# Patient Record
Sex: FEMALE | Race: WHITE | NOT HISPANIC OR LATINO | Employment: OTHER | URBAN - METROPOLITAN AREA
[De-identification: names, ages, dates, MRNs, and addresses within clinical notes are randomized per-mention and may not be internally consistent; named-entity substitution may affect disease eponyms.]

---

## 2019-01-01 ENCOUNTER — APPOINTMENT (OUTPATIENT)
Dept: CT IMAGING | Facility: HOSPITAL | Age: 84
End: 2019-01-01

## 2019-01-01 ENCOUNTER — LAB REQUISITION (OUTPATIENT)
Dept: LAB | Facility: HOSPITAL | Age: 84
End: 2019-01-01

## 2019-01-01 ENCOUNTER — APPOINTMENT (OUTPATIENT)
Dept: GENERAL RADIOLOGY | Facility: HOSPITAL | Age: 84
End: 2019-01-01

## 2019-01-01 ENCOUNTER — INPATIENT HOSPITAL (OUTPATIENT)
Dept: URBAN - METROPOLITAN AREA HOSPITAL 84 | Facility: HOSPITAL | Age: 84
End: 2019-01-01

## 2019-01-01 ENCOUNTER — APPOINTMENT (OUTPATIENT)
Dept: NUCLEAR MEDICINE | Facility: HOSPITAL | Age: 84
End: 2019-01-01

## 2019-01-01 ENCOUNTER — APPOINTMENT (OUTPATIENT)
Dept: ULTRASOUND IMAGING | Facility: HOSPITAL | Age: 84
End: 2019-01-01

## 2019-01-01 ENCOUNTER — OFFICE VISIT (OUTPATIENT)
Dept: CARDIOLOGY | Facility: CLINIC | Age: 84
End: 2019-01-01

## 2019-01-01 ENCOUNTER — HOSPITAL ENCOUNTER (INPATIENT)
Facility: HOSPITAL | Age: 84
LOS: 1 days | Discharge: NURSING, REHAB OR LONG-TERM CARE | End: 2019-12-13
Attending: EMERGENCY MEDICINE | Admitting: INTERNAL MEDICINE

## 2019-01-01 ENCOUNTER — HOSPITAL ENCOUNTER (INPATIENT)
Facility: HOSPITAL | Age: 84
LOS: 4 days | Discharge: NURSING, REHAB OR LONG-TERM CARE | End: 2019-10-28
Attending: EMERGENCY MEDICINE | Admitting: HOSPITALIST

## 2019-01-01 ENCOUNTER — HOSPITAL ENCOUNTER (INPATIENT)
Facility: HOSPITAL | Age: 84
LOS: 4 days | Discharge: NURSING, REHAB OR LONG-TERM CARE | End: 2019-10-21
Attending: EMERGENCY MEDICINE | Admitting: INTERNAL MEDICINE

## 2019-01-01 ENCOUNTER — APPOINTMENT (OUTPATIENT)
Dept: INTERVENTIONAL RADIOLOGY/VASCULAR | Facility: HOSPITAL | Age: 84
End: 2019-01-01

## 2019-01-01 VITALS
OXYGEN SATURATION: 100 % | RESPIRATION RATE: 13 BRPM | WEIGHT: 192.9 LBS | HEIGHT: 64 IN | DIASTOLIC BLOOD PRESSURE: 44 MMHG | TEMPERATURE: 97.9 F | HEART RATE: 85 BPM | SYSTOLIC BLOOD PRESSURE: 115 MMHG | BODY MASS INDEX: 32.93 KG/M2

## 2019-01-01 VITALS
SYSTOLIC BLOOD PRESSURE: 152 MMHG | BODY MASS INDEX: 38.09 KG/M2 | HEART RATE: 70 BPM | WEIGHT: 194 LBS | OXYGEN SATURATION: 85 % | DIASTOLIC BLOOD PRESSURE: 63 MMHG | HEIGHT: 60 IN

## 2019-01-01 VITALS
OXYGEN SATURATION: 99 % | TEMPERATURE: 97.9 F | HEIGHT: 60 IN | HEART RATE: 71 BPM | BODY MASS INDEX: 38.31 KG/M2 | WEIGHT: 195.11 LBS | DIASTOLIC BLOOD PRESSURE: 47 MMHG | RESPIRATION RATE: 20 BRPM | SYSTOLIC BLOOD PRESSURE: 150 MMHG

## 2019-01-01 VITALS
SYSTOLIC BLOOD PRESSURE: 125 MMHG | DIASTOLIC BLOOD PRESSURE: 64 MMHG | WEIGHT: 216.49 LBS | TEMPERATURE: 97.9 F | OXYGEN SATURATION: 97 % | HEIGHT: 60 IN | HEART RATE: 57 BPM | RESPIRATION RATE: 18 BRPM | BODY MASS INDEX: 42.5 KG/M2

## 2019-01-01 DIAGNOSIS — A41.9 SEVERE SEPSIS (HCC): ICD-10-CM

## 2019-01-01 DIAGNOSIS — I50.9 ACUTE CONGESTIVE HEART FAILURE, UNSPECIFIED HEART FAILURE TYPE (HCC): Primary | ICD-10-CM

## 2019-01-01 DIAGNOSIS — N20.1 RIGHT URETERAL STONE: ICD-10-CM

## 2019-01-01 DIAGNOSIS — E11.9 CONTROLLED TYPE 2 DIABETES MELLITUS WITHOUT COMPLICATION, WITHOUT LONG-TERM CURRENT USE OF INSULIN (HCC): ICD-10-CM

## 2019-01-01 DIAGNOSIS — I25.118 CORONARY ARTERY DISEASE OF NATIVE ARTERY OF NATIVE HEART WITH STABLE ANGINA PECTORIS (HCC): Primary | ICD-10-CM

## 2019-01-01 DIAGNOSIS — R77.8 ELEVATED TROPONIN: ICD-10-CM

## 2019-01-01 DIAGNOSIS — J96.01 ACUTE RESPIRATORY FAILURE WITH HYPOXIA AND HYPERCAPNIA (HCC): ICD-10-CM

## 2019-01-01 DIAGNOSIS — N18.4 CKD STAGE 4 SECONDARY TO HYPERTENSION (HCC): ICD-10-CM

## 2019-01-01 DIAGNOSIS — D63.1 ANEMIA IN CHRONIC KIDNEY DISEASE: ICD-10-CM

## 2019-01-01 DIAGNOSIS — R53.1 WEAKNESS: Primary | ICD-10-CM

## 2019-01-01 DIAGNOSIS — R65.20 SEVERE SEPSIS (HCC): ICD-10-CM

## 2019-01-01 DIAGNOSIS — I10 ESSENTIAL HYPERTENSION: ICD-10-CM

## 2019-01-01 DIAGNOSIS — Z00.00 ROUTINE GENERAL MEDICAL EXAMINATION AT A HEALTH CARE FACILITY: ICD-10-CM

## 2019-01-01 DIAGNOSIS — I50.9 ACUTE CONGESTIVE HEART FAILURE, UNSPECIFIED HEART FAILURE TYPE (HCC): ICD-10-CM

## 2019-01-01 DIAGNOSIS — J96.02 ACUTE RESPIRATORY FAILURE WITH HYPOXIA AND HYPERCAPNIA (HCC): ICD-10-CM

## 2019-01-01 DIAGNOSIS — R10.84 GENERALIZED ABDOMINAL PAIN: ICD-10-CM

## 2019-01-01 DIAGNOSIS — I12.9 CKD STAGE 4 SECONDARY TO HYPERTENSION (HCC): ICD-10-CM

## 2019-01-01 DIAGNOSIS — R41.82 ALTERED MENTAL STATUS, UNSPECIFIED ALTERED MENTAL STATUS TYPE: Primary | ICD-10-CM

## 2019-01-01 DIAGNOSIS — N18.9 CHRONIC KIDNEY DISEASE, UNSPECIFIED: ICD-10-CM

## 2019-01-01 DIAGNOSIS — E78.00 PURE HYPERCHOLESTEROLEMIA: ICD-10-CM

## 2019-01-01 DIAGNOSIS — K59.04 CHRONIC IDIOPATHIC CONSTIPATION: ICD-10-CM

## 2019-01-01 LAB
ABO + RH BLD: NORMAL
ABO GROUP BLD: NORMAL
ABO GROUP BLD: NORMAL
ACANTHOCYTES BLD QL SMEAR: ABNORMAL
ALBUMIN SERPL-MCNC: 3.1 G/DL (ref 3.5–5.2)
ALBUMIN SERPL-MCNC: 3.1 G/DL (ref 3.5–5.2)
ALBUMIN SERPL-MCNC: 3.3 G/DL (ref 3.5–5.2)
ALBUMIN SERPL-MCNC: 3.4 G/DL (ref 3.5–5.2)
ALBUMIN SERPL-MCNC: 3.5 G/DL (ref 3.5–5.2)
ALBUMIN SERPL-MCNC: 3.8 G/DL (ref 3.5–5.2)
ALBUMIN/GLOB SERPL: 0.9 G/DL
ALBUMIN/GLOB SERPL: 1 G/DL
ALBUMIN/GLOB SERPL: 1 G/DL
ALBUMIN/GLOB SERPL: 1.1 G/DL
ALBUMIN/GLOB SERPL: 1.2 G/DL
ALP SERPL-CCNC: 116 U/L (ref 39–117)
ALP SERPL-CCNC: 56 U/L (ref 39–117)
ALP SERPL-CCNC: 56 U/L (ref 39–117)
ALP SERPL-CCNC: 69 U/L (ref 39–117)
ALP SERPL-CCNC: 74 U/L (ref 39–117)
ALP SERPL-CCNC: 75 U/L (ref 39–117)
ALP SERPL-CCNC: 79 U/L (ref 39–117)
ALP SERPL-CCNC: 79 U/L (ref 39–117)
ALP SERPL-CCNC: 84 U/L (ref 39–117)
ALT SERPL W P-5'-P-CCNC: 10 U/L (ref 1–33)
ALT SERPL W P-5'-P-CCNC: 10 U/L (ref 1–33)
ALT SERPL W P-5'-P-CCNC: 25 U/L (ref 1–33)
ALT SERPL W P-5'-P-CCNC: 8 U/L (ref 1–33)
ALT SERPL W P-5'-P-CCNC: 9 U/L (ref 1–33)
ALT SERPL W P-5'-P-CCNC: 9 U/L (ref 1–33)
ANION GAP SERPL CALCULATED.3IONS-SCNC: 10 MMOL/L (ref 5–15)
ANION GAP SERPL CALCULATED.3IONS-SCNC: 12 MMOL/L (ref 5–15)
ANION GAP SERPL CALCULATED.3IONS-SCNC: 14 MMOL/L (ref 5–15)
ANION GAP SERPL CALCULATED.3IONS-SCNC: 14.2 MMOL/L (ref 5–15)
ANION GAP SERPL CALCULATED.3IONS-SCNC: 15 MMOL/L (ref 5–15)
ANION GAP SERPL CALCULATED.3IONS-SCNC: 15.9 MMOL/L (ref 5–15)
ANION GAP SERPL CALCULATED.3IONS-SCNC: 9 MMOL/L (ref 5–15)
APTT PPP: 24.2 SECONDS (ref 24–31)
APTT PPP: 25.3 SECONDS (ref 24–31)
ARTERIAL PATENCY WRIST A: POSITIVE
AST SERPL-CCNC: 15 U/L (ref 1–32)
AST SERPL-CCNC: 15 U/L (ref 1–32)
AST SERPL-CCNC: 16 U/L (ref 1–32)
AST SERPL-CCNC: 16 U/L (ref 1–32)
AST SERPL-CCNC: 17 U/L (ref 1–32)
AST SERPL-CCNC: 18 U/L (ref 1–32)
AST SERPL-CCNC: 19 U/L (ref 1–32)
AST SERPL-CCNC: 23 U/L (ref 1–32)
AST SERPL-CCNC: 42 U/L (ref 1–32)
ATMOSPHERIC PRESS: ABNORMAL MM[HG]
B PERT DNA SPEC QL NAA+PROBE: NOT DETECTED
B PERT DNA SPEC QL NAA+PROBE: NOT DETECTED
BACTERIA BLD CULT: ABNORMAL
BACTERIA SPEC AEROBE CULT: ABNORMAL
BACTERIA SPEC AEROBE CULT: NORMAL
BACTERIA UR QL AUTO: ABNORMAL /HPF
BASE EXCESS BLDA CALC-SCNC: 0.9 MMOL/L (ref 0–3)
BASE EXCESS BLDA CALC-SCNC: 10.4 MMOL/L (ref 0–3)
BASE EXCESS BLDA CALC-SCNC: 12.5 MMOL/L (ref 0–3)
BASE EXCESS BLDA CALC-SCNC: 9.7 MMOL/L (ref 0–3)
BASOPHILS # BLD AUTO: 0 10*3/MM3 (ref 0–0.2)
BASOPHILS # BLD AUTO: 0.1 10*3/MM3 (ref 0–0.2)
BASOPHILS NFR BLD AUTO: 0.3 % (ref 0–1.5)
BASOPHILS NFR BLD AUTO: 0.5 % (ref 0–1.5)
BASOPHILS NFR BLD AUTO: 0.6 % (ref 0–1.5)
BASOPHILS NFR BLD AUTO: 0.7 % (ref 0–1.5)
BASOPHILS NFR BLD AUTO: 0.7 % (ref 0–1.5)
BASOPHILS NFR BLD AUTO: 1 % (ref 0–1.5)
BASOPHILS NFR BLD AUTO: 1.1 % (ref 0–1.5)
BASOPHILS NFR BLD AUTO: 1.1 % (ref 0–1.5)
BDY SITE: ABNORMAL
BH BB BLOOD EXPIRATION DATE: NORMAL
BH BB BLOOD TYPE BARCODE: 600
BH BB DISPENSE STATUS: NORMAL
BH BB PRODUCT CODE: NORMAL
BH BB UNIT NUMBER: NORMAL
BILIRUB SERPL-MCNC: 0.2 MG/DL (ref 0.2–1.2)
BILIRUB SERPL-MCNC: 0.3 MG/DL (ref 0.2–1.2)
BILIRUB SERPL-MCNC: 0.4 MG/DL (ref 0.2–1.2)
BILIRUB SERPL-MCNC: 0.5 MG/DL (ref 0.2–1.2)
BILIRUB UR QL STRIP: NEGATIVE
BLD GP AB SCN SERPL QL: NEGATIVE
BLD GP AB SCN SERPL QL: NEGATIVE
BUN BLD-MCNC: 50 MG/DL (ref 8–23)
BUN BLD-MCNC: 53 MG/DL (ref 8–23)
BUN BLD-MCNC: 55 MG/DL (ref 8–23)
BUN BLD-MCNC: 61 MG/DL (ref 8–23)
BUN BLD-MCNC: 62 MG/DL (ref 8–23)
BUN BLD-MCNC: 62 MG/DL (ref 8–23)
BUN BLD-MCNC: 63 MG/DL (ref 8–23)
BUN BLD-MCNC: 66 MG/DL (ref 8–23)
BUN BLD-MCNC: 66 MG/DL (ref 8–23)
BUN BLD-MCNC: 67 MG/DL (ref 8–23)
BUN BLD-MCNC: 74 MG/DL (ref 8–23)
BUN BLD-MCNC: 76 MG/DL (ref 8–23)
BUN BLD-MCNC: 83 MG/DL (ref 8–23)
BUN BLD-MCNC: 84 MG/DL (ref 8–23)
BUN BLD-MCNC: 84 MG/DL (ref 8–23)
BUN/CREAT SERPL: 16.5 (ref 7–25)
BUN/CREAT SERPL: 19 (ref 7–25)
BUN/CREAT SERPL: 19.2 (ref 7–25)
BUN/CREAT SERPL: 22.4 (ref 7–25)
BUN/CREAT SERPL: 22.5 (ref 7–25)
BUN/CREAT SERPL: 23.3 (ref 7–25)
BUN/CREAT SERPL: 25 (ref 7–25)
BUN/CREAT SERPL: 25.3 (ref 7–25)
BUN/CREAT SERPL: 25.9 (ref 7–25)
BUN/CREAT SERPL: 26.2 (ref 7–25)
BUN/CREAT SERPL: 27.6 (ref 7–25)
BUN/CREAT SERPL: 28.4 (ref 7–25)
BUN/CREAT SERPL: 28.5 (ref 7–25)
BUN/CREAT SERPL: 30.3 (ref 7–25)
BUN/CREAT SERPL: 31.7 (ref 7–25)
C PNEUM DNA NPH QL NAA+NON-PROBE: NOT DETECTED
C PNEUM DNA NPH QL NAA+NON-PROBE: NOT DETECTED
CA-I BLDA-SCNC: 0.66 MMOL/L (ref 1.15–1.33)
CA-I BLDA-SCNC: 1.15 MMOL/L (ref 1.15–1.33)
CA-I SERPL ISE-MCNC: 0.95 MMOL/L (ref 1.2–1.3)
CA-I SERPL ISE-MCNC: 1.14 MMOL/L (ref 1.2–1.3)
CA-I SERPL ISE-MCNC: 1.15 MMOL/L (ref 1.2–1.3)
CA-I SERPL ISE-MCNC: 1.15 MMOL/L (ref 1.2–1.3)
CA-I SERPL ISE-MCNC: 1.2 MMOL/L (ref 1.2–1.3)
CALCIUM SPEC-SCNC: 8.1 MG/DL (ref 8.6–10.5)
CALCIUM SPEC-SCNC: 8.4 MG/DL (ref 8.6–10.5)
CALCIUM SPEC-SCNC: 8.4 MG/DL (ref 8.6–10.5)
CALCIUM SPEC-SCNC: 8.5 MG/DL (ref 8.6–10.5)
CALCIUM SPEC-SCNC: 8.5 MG/DL (ref 8.6–10.5)
CALCIUM SPEC-SCNC: 8.6 MG/DL (ref 8.6–10.5)
CALCIUM SPEC-SCNC: 8.6 MG/DL (ref 8.6–10.5)
CALCIUM SPEC-SCNC: 8.7 MG/DL (ref 8.6–10.5)
CALCIUM SPEC-SCNC: 8.8 MG/DL (ref 8.6–10.5)
CALCIUM SPEC-SCNC: 9.1 MG/DL (ref 8.6–10.5)
CALCIUM SPEC-SCNC: 9.1 MG/DL (ref 8.6–10.5)
CHLORIDE SERPL-SCNC: 100 MMOL/L (ref 98–107)
CHLORIDE SERPL-SCNC: 101 MMOL/L (ref 98–107)
CHLORIDE SERPL-SCNC: 103 MMOL/L (ref 98–107)
CHLORIDE SERPL-SCNC: 103 MMOL/L (ref 98–107)
CHLORIDE SERPL-SCNC: 104 MMOL/L (ref 98–107)
CHLORIDE SERPL-SCNC: 95 MMOL/L (ref 98–107)
CHLORIDE SERPL-SCNC: 95 MMOL/L (ref 98–107)
CHLORIDE SERPL-SCNC: 96 MMOL/L (ref 98–107)
CHLORIDE SERPL-SCNC: 97 MMOL/L (ref 98–107)
CHLORIDE SERPL-SCNC: 98 MMOL/L (ref 98–107)
CHLORIDE SERPL-SCNC: 98 MMOL/L (ref 98–107)
CHOLEST SERPL-MCNC: 119 MG/DL (ref 0–200)
CK SERPL-CCNC: 41 U/L (ref 20–180)
CK SERPL-CCNC: 81 U/L (ref 20–180)
CLARITY UR: ABNORMAL
CLARITY UR: ABNORMAL
CLARITY UR: CLEAR
CLARITY UR: CLEAR
CO2 BLDA-SCNC: 29.4 MMOL/L (ref 22–29)
CO2 BLDA-SCNC: 39.7 MMOL/L (ref 22–29)
CO2 BLDA-SCNC: 40.2 MMOL/L (ref 22–29)
CO2 BLDA-SCNC: 40.8 MMOL/L (ref 22–29)
CO2 SERPL-SCNC: 24 MMOL/L (ref 22–29)
CO2 SERPL-SCNC: 25 MMOL/L (ref 22–29)
CO2 SERPL-SCNC: 26 MMOL/L (ref 22–29)
CO2 SERPL-SCNC: 27 MMOL/L (ref 22–29)
CO2 SERPL-SCNC: 29 MMOL/L (ref 22–29)
CO2 SERPL-SCNC: 30 MMOL/L (ref 22–29)
CO2 SERPL-SCNC: 31 MMOL/L (ref 22–29)
CO2 SERPL-SCNC: 33 MMOL/L (ref 22–29)
CO2 SERPL-SCNC: 34 MMOL/L (ref 22–29)
CO2 SERPL-SCNC: 34 MMOL/L (ref 22–29)
CO2 SERPL-SCNC: 35 MMOL/L (ref 22–29)
COLOR UR: YELLOW
CREAT BLD-MCNC: 2.1 MG/DL (ref 0.57–1)
CREAT BLD-MCNC: 2.59 MG/DL (ref 0.57–1)
CREAT BLD-MCNC: 2.6 MG/DL (ref 0.57–1)
CREAT BLD-MCNC: 2.6 MG/DL (ref 0.57–1)
CREAT BLD-MCNC: 2.61 MG/DL (ref 0.57–1)
CREAT BLD-MCNC: 2.64 MG/DL (ref 0.57–1)
CREAT BLD-MCNC: 2.65 MG/DL (ref 0.57–1)
CREAT BLD-MCNC: 2.66 MG/DL (ref 0.57–1)
CREAT BLD-MCNC: 2.72 MG/DL (ref 0.57–1)
CREAT BLD-MCNC: 2.74 MG/DL (ref 0.57–1)
CREAT BLD-MCNC: 2.75 MG/DL (ref 0.57–1)
CREAT BLD-MCNC: 2.76 MG/DL (ref 0.57–1)
CREAT BLD-MCNC: 2.79 MG/DL (ref 0.57–1)
CREAT BLD-MCNC: 2.96 MG/DL (ref 0.57–1)
CREAT BLD-MCNC: 3.82 MG/DL (ref 0.57–1)
CRP SERPL-MCNC: 2.55 MG/DL (ref 0–0.5)
CRP SERPL-MCNC: 6.89 MG/DL (ref 0–0.5)
D DIMER PPP FEU-MCNC: 4.59 MCGFEU/ML (ref 0.17–0.59)
D-LACTATE SERPL-SCNC: 1 MMOL/L (ref 0.5–2)
D-LACTATE SERPL-SCNC: 2.4 MMOL/L (ref 0.5–2)
D-LACTATE SERPL-SCNC: 2.7 MMOL/L (ref 0.5–2)
D-LACTATE SERPL-SCNC: 3.2 MMOL/L (ref 0.5–2)
DACRYOCYTES BLD QL SMEAR: ABNORMAL
DEPRECATED RDW RBC AUTO: 55.1 FL (ref 37–54)
DEPRECATED RDW RBC AUTO: 57.8 FL (ref 37–54)
DEPRECATED RDW RBC AUTO: 59.9 FL (ref 37–54)
DEPRECATED RDW RBC AUTO: 62.1 FL (ref 37–54)
DEPRECATED RDW RBC AUTO: 63.4 FL (ref 37–54)
DEPRECATED RDW RBC AUTO: 63.4 FL (ref 37–54)
DEPRECATED RDW RBC AUTO: 64.8 FL (ref 37–54)
DEPRECATED RDW RBC AUTO: 65.2 FL (ref 37–54)
DEPRECATED RDW RBC AUTO: 66.5 FL (ref 37–54)
DEPRECATED RDW RBC AUTO: 66.9 FL (ref 37–54)
DEPRECATED RDW RBC AUTO: 68.3 FL (ref 37–54)
DEPRECATED RDW RBC AUTO: 68.7 FL (ref 37–54)
DEPRECATED RDW RBC AUTO: 70.9 FL (ref 37–54)
DEPRECATED RDW RBC AUTO: 71.8 FL (ref 37–54)
EOSINOPHIL # BLD AUTO: 0 10*3/MM3 (ref 0–0.4)
EOSINOPHIL # BLD AUTO: 0.1 10*3/MM3 (ref 0–0.4)
EOSINOPHIL # BLD AUTO: 0.3 10*3/MM3 (ref 0–0.4)
EOSINOPHIL # BLD AUTO: 0.4 10*3/MM3 (ref 0–0.4)
EOSINOPHIL # BLD AUTO: 0.5 10*3/MM3 (ref 0–0.4)
EOSINOPHIL NFR BLD AUTO: 0.1 % (ref 0.3–6.2)
EOSINOPHIL NFR BLD AUTO: 1.4 % (ref 0.3–6.2)
EOSINOPHIL NFR BLD AUTO: 3.5 % (ref 0.3–6.2)
EOSINOPHIL NFR BLD AUTO: 3.7 % (ref 0.3–6.2)
EOSINOPHIL NFR BLD AUTO: 4.3 % (ref 0.3–6.2)
EOSINOPHIL NFR BLD AUTO: 5.6 % (ref 0.3–6.2)
ERYTHROCYTE [DISTWIDTH] IN BLOOD BY AUTOMATED COUNT: 17.5 % (ref 12.3–15.4)
ERYTHROCYTE [DISTWIDTH] IN BLOOD BY AUTOMATED COUNT: 18.1 % (ref 12.3–15.4)
ERYTHROCYTE [DISTWIDTH] IN BLOOD BY AUTOMATED COUNT: 18.3 % (ref 12.3–15.4)
ERYTHROCYTE [DISTWIDTH] IN BLOOD BY AUTOMATED COUNT: 20.1 % (ref 12.3–15.4)
ERYTHROCYTE [DISTWIDTH] IN BLOOD BY AUTOMATED COUNT: 20.2 % (ref 12.3–15.4)
ERYTHROCYTE [DISTWIDTH] IN BLOOD BY AUTOMATED COUNT: 20.5 % (ref 12.3–15.4)
ERYTHROCYTE [DISTWIDTH] IN BLOOD BY AUTOMATED COUNT: 20.8 % (ref 12.3–15.4)
ERYTHROCYTE [DISTWIDTH] IN BLOOD BY AUTOMATED COUNT: 20.8 % (ref 12.3–15.4)
ERYTHROCYTE [DISTWIDTH] IN BLOOD BY AUTOMATED COUNT: 21 % (ref 12.3–15.4)
ERYTHROCYTE [DISTWIDTH] IN BLOOD BY AUTOMATED COUNT: 21 % (ref 12.3–15.4)
ERYTHROCYTE [DISTWIDTH] IN BLOOD BY AUTOMATED COUNT: 21.1 % (ref 12.3–15.4)
ERYTHROCYTE [DISTWIDTH] IN BLOOD BY AUTOMATED COUNT: 21.5 % (ref 12.3–15.4)
ERYTHROCYTE [DISTWIDTH] IN BLOOD BY AUTOMATED COUNT: 21.8 % (ref 12.3–15.4)
ERYTHROCYTE [DISTWIDTH] IN BLOOD BY AUTOMATED COUNT: 22.3 % (ref 12.3–15.4)
ERYTHROCYTE [SEDIMENTATION RATE] IN BLOOD: 69 MM/HR (ref 0–30)
FERRITIN SERPL-MCNC: 90.76 NG/ML (ref 13–150)
FLUAV H1 2009 PAND RNA NPH QL NAA+PROBE: NOT DETECTED
FLUAV H1 2009 PAND RNA NPH QL NAA+PROBE: NOT DETECTED
FLUAV H1 HA GENE NPH QL NAA+PROBE: NOT DETECTED
FLUAV H1 HA GENE NPH QL NAA+PROBE: NOT DETECTED
FLUAV H3 RNA NPH QL NAA+PROBE: NOT DETECTED
FLUAV H3 RNA NPH QL NAA+PROBE: NOT DETECTED
FLUAV SUBTYP SPEC NAA+PROBE: NOT DETECTED
FLUAV SUBTYP SPEC NAA+PROBE: NOT DETECTED
FLUBV RNA ISLT QL NAA+PROBE: NOT DETECTED
FLUBV RNA ISLT QL NAA+PROBE: NOT DETECTED
FOLATE SERPL-MCNC: 9.74 NG/ML (ref 4.78–24.2)
GFR SERPL CREATININE-BSD FRML MDRD: 11 ML/MIN/1.73
GFR SERPL CREATININE-BSD FRML MDRD: 15 ML/MIN/1.73
GFR SERPL CREATININE-BSD FRML MDRD: 16 ML/MIN/1.73
GFR SERPL CREATININE-BSD FRML MDRD: 17 ML/MIN/1.73
GFR SERPL CREATININE-BSD FRML MDRD: 18 ML/MIN/1.73
GFR SERPL CREATININE-BSD FRML MDRD: 22 ML/MIN/1.73
GFR SERPL CREATININE-BSD FRML MDRD: ABNORMAL ML/MIN/{1.73_M2}
GIANT PLATELETS: ABNORMAL
GLOBULIN UR ELPH-MCNC: 2.8 GM/DL
GLOBULIN UR ELPH-MCNC: 2.9 GM/DL
GLOBULIN UR ELPH-MCNC: 3 GM/DL
GLOBULIN UR ELPH-MCNC: 3.3 GM/DL
GLOBULIN UR ELPH-MCNC: 3.5 GM/DL
GLOBULIN UR ELPH-MCNC: 3.6 GM/DL
GLOBULIN UR ELPH-MCNC: 3.8 GM/DL
GLUCOSE BLD-MCNC: 105 MG/DL (ref 65–99)
GLUCOSE BLD-MCNC: 114 MG/DL (ref 65–99)
GLUCOSE BLD-MCNC: 116 MG/DL (ref 65–99)
GLUCOSE BLD-MCNC: 122 MG/DL (ref 65–99)
GLUCOSE BLD-MCNC: 142 MG/DL (ref 65–99)
GLUCOSE BLD-MCNC: 159 MG/DL (ref 65–99)
GLUCOSE BLD-MCNC: 171 MG/DL (ref 65–99)
GLUCOSE BLD-MCNC: 175 MG/DL (ref 65–99)
GLUCOSE BLD-MCNC: 201 MG/DL (ref 65–99)
GLUCOSE BLD-MCNC: 222 MG/DL (ref 65–99)
GLUCOSE BLD-MCNC: 271 MG/DL (ref 65–99)
GLUCOSE BLD-MCNC: 292 MG/DL (ref 65–99)
GLUCOSE BLD-MCNC: 46 MG/DL (ref 65–99)
GLUCOSE BLDC GLUCOMTR-MCNC: 102 MG/DL (ref 70–105)
GLUCOSE BLDC GLUCOMTR-MCNC: 110 MG/DL (ref 70–105)
GLUCOSE BLDC GLUCOMTR-MCNC: 112 MG/DL (ref 70–105)
GLUCOSE BLDC GLUCOMTR-MCNC: 118 MG/DL (ref 70–105)
GLUCOSE BLDC GLUCOMTR-MCNC: 122 MG/DL (ref 70–105)
GLUCOSE BLDC GLUCOMTR-MCNC: 129 MG/DL (ref 74–100)
GLUCOSE BLDC GLUCOMTR-MCNC: 134 MG/DL (ref 70–105)
GLUCOSE BLDC GLUCOMTR-MCNC: 136 MG/DL (ref 70–105)
GLUCOSE BLDC GLUCOMTR-MCNC: 136 MG/DL (ref 70–105)
GLUCOSE BLDC GLUCOMTR-MCNC: 137 MG/DL (ref 70–105)
GLUCOSE BLDC GLUCOMTR-MCNC: 138 MG/DL (ref 70–105)
GLUCOSE BLDC GLUCOMTR-MCNC: 143 MG/DL (ref 70–105)
GLUCOSE BLDC GLUCOMTR-MCNC: 143 MG/DL (ref 70–105)
GLUCOSE BLDC GLUCOMTR-MCNC: 145 MG/DL (ref 70–105)
GLUCOSE BLDC GLUCOMTR-MCNC: 145 MG/DL (ref 70–105)
GLUCOSE BLDC GLUCOMTR-MCNC: 146 MG/DL (ref 70–105)
GLUCOSE BLDC GLUCOMTR-MCNC: 148 MG/DL (ref 70–105)
GLUCOSE BLDC GLUCOMTR-MCNC: 149 MG/DL (ref 70–105)
GLUCOSE BLDC GLUCOMTR-MCNC: 151 MG/DL (ref 70–105)
GLUCOSE BLDC GLUCOMTR-MCNC: 152 MG/DL (ref 70–105)
GLUCOSE BLDC GLUCOMTR-MCNC: 152 MG/DL (ref 70–105)
GLUCOSE BLDC GLUCOMTR-MCNC: 162 MG/DL (ref 70–105)
GLUCOSE BLDC GLUCOMTR-MCNC: 169 MG/DL (ref 70–105)
GLUCOSE BLDC GLUCOMTR-MCNC: 172 MG/DL (ref 70–105)
GLUCOSE BLDC GLUCOMTR-MCNC: 173 MG/DL (ref 70–105)
GLUCOSE BLDC GLUCOMTR-MCNC: 180 MG/DL (ref 70–105)
GLUCOSE BLDC GLUCOMTR-MCNC: 182 MG/DL (ref 70–105)
GLUCOSE BLDC GLUCOMTR-MCNC: 185 MG/DL (ref 70–105)
GLUCOSE BLDC GLUCOMTR-MCNC: 194 MG/DL (ref 70–105)
GLUCOSE BLDC GLUCOMTR-MCNC: 204 MG/DL (ref 70–105)
GLUCOSE BLDC GLUCOMTR-MCNC: 241 MG/DL (ref 70–105)
GLUCOSE BLDC GLUCOMTR-MCNC: 250 MG/DL (ref 70–105)
GLUCOSE BLDC GLUCOMTR-MCNC: 275 MG/DL (ref 70–105)
GLUCOSE BLDC GLUCOMTR-MCNC: 296 MG/DL (ref 70–105)
GLUCOSE BLDC GLUCOMTR-MCNC: 54 MG/DL (ref 70–105)
GLUCOSE BLDC GLUCOMTR-MCNC: 61 MG/DL (ref 70–105)
GLUCOSE BLDC GLUCOMTR-MCNC: 63 MG/DL (ref 70–105)
GLUCOSE BLDC GLUCOMTR-MCNC: 71 MG/DL (ref 70–105)
GLUCOSE BLDC GLUCOMTR-MCNC: 80 MG/DL (ref 70–105)
GLUCOSE BLDC GLUCOMTR-MCNC: 81 MG/DL (ref 70–105)
GLUCOSE BLDC GLUCOMTR-MCNC: 83 MG/DL (ref 70–105)
GLUCOSE BLDC GLUCOMTR-MCNC: 83 MG/DL (ref 74–100)
GLUCOSE BLDC GLUCOMTR-MCNC: 84 MG/DL (ref 70–105)
GLUCOSE BLDC GLUCOMTR-MCNC: 96 MG/DL (ref 70–105)
GLUCOSE BLDC GLUCOMTR-MCNC: 97 MG/DL (ref 70–105)
GLUCOSE UR STRIP-MCNC: NEGATIVE MG/DL
GRAM STN SPEC: ABNORMAL
HADV DNA SPEC NAA+PROBE: NOT DETECTED
HADV DNA SPEC NAA+PROBE: NOT DETECTED
HBA1C MFR BLD: 5 % (ref 3.5–5.6)
HBA1C MFR BLD: 5.4 % (ref 3.5–5.6)
HBV SURFACE AG SERPL QL IA: NORMAL
HCO3 BLDA-SCNC: 27.7 MMOL/L (ref 21–28)
HCO3 BLDA-SCNC: 37.9 MMOL/L (ref 21–28)
HCO3 BLDA-SCNC: 38 MMOL/L (ref 21–28)
HCO3 BLDA-SCNC: 38.6 MMOL/L (ref 21–28)
HCOV 229E RNA SPEC QL NAA+PROBE: NOT DETECTED
HCOV 229E RNA SPEC QL NAA+PROBE: NOT DETECTED
HCOV HKU1 RNA SPEC QL NAA+PROBE: NOT DETECTED
HCOV HKU1 RNA SPEC QL NAA+PROBE: NOT DETECTED
HCOV NL63 RNA SPEC QL NAA+PROBE: NOT DETECTED
HCOV NL63 RNA SPEC QL NAA+PROBE: NOT DETECTED
HCOV OC43 RNA SPEC QL NAA+PROBE: NOT DETECTED
HCOV OC43 RNA SPEC QL NAA+PROBE: NOT DETECTED
HCT VFR BLD AUTO: 24.5 % (ref 34–46.6)
HCT VFR BLD AUTO: 25.3 % (ref 34–46.6)
HCT VFR BLD AUTO: 26.7 % (ref 34–46.6)
HCT VFR BLD AUTO: 26.8 % (ref 34–46.6)
HCT VFR BLD AUTO: 27.3 % (ref 34–46.6)
HCT VFR BLD AUTO: 27.7 % (ref 34–46.6)
HCT VFR BLD AUTO: 27.8 % (ref 34–46.6)
HCT VFR BLD AUTO: 28.2 % (ref 34–46.6)
HCT VFR BLD AUTO: 28.6 % (ref 34–46.6)
HCT VFR BLD AUTO: 29 % (ref 34–46.6)
HCT VFR BLD AUTO: 29.5 % (ref 34–46.6)
HCT VFR BLD AUTO: 30.4 % (ref 34–46.6)
HCT VFR BLD AUTO: 33.3 % (ref 34–46.6)
HCT VFR BLD AUTO: 34.8 % (ref 34–46.6)
HCT VFR BLDA CALC: 29 % (ref 38–51)
HCT VFR BLDA CALC: 30 % (ref 38–51)
HDLC SERPL-MCNC: 43 MG/DL (ref 40–60)
HEMODILUTION: NO
HEMODILUTION: NO
HEMODILUTION: YES
HEMODILUTION: YES
HGB BLD-MCNC: 10.5 G/DL (ref 12–15.9)
HGB BLD-MCNC: 11.1 G/DL (ref 12–15.9)
HGB BLD-MCNC: 7.8 G/DL (ref 12–15.9)
HGB BLD-MCNC: 8.2 G/DL (ref 12–15.9)
HGB BLD-MCNC: 8.4 G/DL (ref 12–15.9)
HGB BLD-MCNC: 8.6 G/DL (ref 12–15.9)
HGB BLD-MCNC: 8.7 G/DL (ref 12–15.9)
HGB BLD-MCNC: 8.8 G/DL (ref 12–15.9)
HGB BLD-MCNC: 9 G/DL (ref 12–15.9)
HGB BLD-MCNC: 9.4 G/DL (ref 12–15.9)
HGB BLDA-MCNC: 10.1 G/DL (ref 12–17)
HGB BLDA-MCNC: 9.8 G/DL (ref 12–17)
HGB UR QL STRIP.AUTO: ABNORMAL
HGB UR QL STRIP.AUTO: NEGATIVE
HMPV RNA NPH QL NAA+NON-PROBE: NOT DETECTED
HMPV RNA NPH QL NAA+NON-PROBE: NOT DETECTED
HOLD SPECIMEN: NORMAL
HOROWITZ INDEX BLD+IHG-RTO: 40 %
HOROWITZ INDEX BLD+IHG-RTO: <21 %
HPIV1 RNA SPEC QL NAA+PROBE: NOT DETECTED
HPIV1 RNA SPEC QL NAA+PROBE: NOT DETECTED
HPIV2 RNA SPEC QL NAA+PROBE: NOT DETECTED
HPIV2 RNA SPEC QL NAA+PROBE: NOT DETECTED
HPIV3 RNA NPH QL NAA+PROBE: NOT DETECTED
HPIV3 RNA NPH QL NAA+PROBE: NOT DETECTED
HPIV4 P GENE NPH QL NAA+PROBE: NOT DETECTED
HPIV4 P GENE NPH QL NAA+PROBE: NOT DETECTED
HYALINE CASTS UR QL AUTO: ABNORMAL /LPF
INR PPP: 0.98 (ref 0.9–1.1)
INR PPP: 1.03 (ref 0.9–1.1)
IRON 24H UR-MRATE: 41 MCG/DL (ref 37–145)
ISOLATED FROM: ABNORMAL
ISOLATED FROM: ABNORMAL
KETONES UR QL STRIP: NEGATIVE
LARGE PLATELETS: ABNORMAL
LDLC SERPL CALC-MCNC: 54 MG/DL (ref 0–100)
LDLC/HDLC SERPL: 1.26 {RATIO}
LEUKOCYTE ESTERASE UR QL STRIP.AUTO: ABNORMAL
LEUKOCYTE ESTERASE UR QL STRIP.AUTO: NEGATIVE
LYMPHOCYTES # BLD AUTO: 0.7 10*3/MM3 (ref 0.7–3.1)
LYMPHOCYTES # BLD AUTO: 0.8 10*3/MM3 (ref 0.7–3.1)
LYMPHOCYTES # BLD AUTO: 0.8 10*3/MM3 (ref 0.7–3.1)
LYMPHOCYTES # BLD AUTO: 0.9 10*3/MM3 (ref 0.7–3.1)
LYMPHOCYTES # BLD AUTO: 1.1 10*3/MM3 (ref 0.7–3.1)
LYMPHOCYTES # BLD AUTO: 1.1 10*3/MM3 (ref 0.7–3.1)
LYMPHOCYTES # BLD AUTO: 1.3 10*3/MM3 (ref 0.7–3.1)
LYMPHOCYTES # BLD AUTO: 1.5 10*3/MM3 (ref 0.7–3.1)
LYMPHOCYTES # BLD MANUAL: 0 10*3/MM3 (ref 0.7–3.1)
LYMPHOCYTES # BLD MANUAL: 1.47 10*3/MM3 (ref 0.7–3.1)
LYMPHOCYTES NFR BLD AUTO: 10.9 % (ref 19.6–45.3)
LYMPHOCYTES NFR BLD AUTO: 11.5 % (ref 19.6–45.3)
LYMPHOCYTES NFR BLD AUTO: 13.7 % (ref 19.6–45.3)
LYMPHOCYTES NFR BLD AUTO: 13.8 % (ref 19.6–45.3)
LYMPHOCYTES NFR BLD AUTO: 15.9 % (ref 19.6–45.3)
LYMPHOCYTES NFR BLD AUTO: 16.1 % (ref 19.6–45.3)
LYMPHOCYTES NFR BLD AUTO: 3.1 % (ref 19.6–45.3)
LYMPHOCYTES NFR BLD AUTO: 9.3 % (ref 19.6–45.3)
LYMPHOCYTES NFR BLD MANUAL: 0 % (ref 19.6–45.3)
LYMPHOCYTES NFR BLD MANUAL: 4 % (ref 5–12)
LYMPHOCYTES NFR BLD MANUAL: 6 % (ref 19.6–45.3)
LYMPHOCYTES NFR BLD MANUAL: 6 % (ref 5–12)
M PNEUMO IGG SER IA-ACNC: NOT DETECTED
M PNEUMO IGG SER IA-ACNC: NOT DETECTED
MAGNESIUM SERPL-MCNC: 1.9 MG/DL (ref 1.6–2.4)
MAGNESIUM SERPL-MCNC: 1.9 MG/DL (ref 1.6–2.4)
MAGNESIUM SERPL-MCNC: 2.1 MG/DL (ref 1.6–2.4)
MAGNESIUM SERPL-MCNC: 2.2 MG/DL (ref 1.6–2.4)
MAGNESIUM SERPL-MCNC: 2.4 MG/DL (ref 1.6–2.4)
MAGNESIUM SERPL-MCNC: 2.6 MG/DL (ref 1.6–2.4)
MAGNESIUM SERPL-MCNC: 2.6 MG/DL (ref 1.6–2.4)
MAGNESIUM SERPL-MCNC: 3.2 MG/DL (ref 1.6–2.4)
MCH RBC QN AUTO: 27.3 PG (ref 26.6–33)
MCH RBC QN AUTO: 27.5 PG (ref 26.6–33)
MCH RBC QN AUTO: 27.5 PG (ref 26.6–33)
MCH RBC QN AUTO: 27.6 PG (ref 26.6–33)
MCH RBC QN AUTO: 27.8 PG (ref 26.6–33)
MCH RBC QN AUTO: 27.9 PG (ref 26.6–33)
MCH RBC QN AUTO: 28 PG (ref 26.6–33)
MCH RBC QN AUTO: 28.1 PG (ref 26.6–33)
MCH RBC QN AUTO: 28.2 PG (ref 26.6–33)
MCH RBC QN AUTO: 28.3 PG (ref 26.6–33)
MCH RBC QN AUTO: 28.4 PG (ref 26.6–33)
MCH RBC QN AUTO: 29 PG (ref 26.6–33)
MCHC RBC AUTO-ENTMCNC: 30.2 G/DL (ref 31.5–35.7)
MCHC RBC AUTO-ENTMCNC: 30.7 G/DL (ref 31.5–35.7)
MCHC RBC AUTO-ENTMCNC: 30.8 G/DL (ref 31.5–35.7)
MCHC RBC AUTO-ENTMCNC: 30.9 G/DL (ref 31.5–35.7)
MCHC RBC AUTO-ENTMCNC: 30.9 G/DL (ref 31.5–35.7)
MCHC RBC AUTO-ENTMCNC: 31.1 G/DL (ref 31.5–35.7)
MCHC RBC AUTO-ENTMCNC: 31.4 G/DL (ref 31.5–35.7)
MCHC RBC AUTO-ENTMCNC: 31.5 G/DL (ref 31.5–35.7)
MCHC RBC AUTO-ENTMCNC: 31.5 G/DL (ref 31.5–35.7)
MCHC RBC AUTO-ENTMCNC: 31.7 G/DL (ref 31.5–35.7)
MCHC RBC AUTO-ENTMCNC: 32 G/DL (ref 31.5–35.7)
MCHC RBC AUTO-ENTMCNC: 32.3 G/DL (ref 31.5–35.7)
MCHC RBC AUTO-ENTMCNC: 32.3 G/DL (ref 31.5–35.7)
MCHC RBC AUTO-ENTMCNC: 32.4 G/DL (ref 31.5–35.7)
MCV RBC AUTO: 87.6 FL (ref 79–97)
MCV RBC AUTO: 87.6 FL (ref 79–97)
MCV RBC AUTO: 87.7 FL (ref 79–97)
MCV RBC AUTO: 87.8 FL (ref 79–97)
MCV RBC AUTO: 88.3 FL (ref 79–97)
MCV RBC AUTO: 88.8 FL (ref 79–97)
MCV RBC AUTO: 89 FL (ref 79–97)
MCV RBC AUTO: 89.3 FL (ref 79–97)
MCV RBC AUTO: 89.3 FL (ref 79–97)
MCV RBC AUTO: 89.4 FL (ref 79–97)
MCV RBC AUTO: 90.4 FL (ref 79–97)
MCV RBC AUTO: 90.9 FL (ref 79–97)
MCV RBC AUTO: 91.1 FL (ref 79–97)
MCV RBC AUTO: 91.9 FL (ref 79–97)
METAMYELOCYTES NFR BLD MANUAL: 2 % (ref 0–0)
MODALITY: ABNORMAL
MONOCYTES # BLD AUTO: 0.5 10*3/MM3 (ref 0.1–0.9)
MONOCYTES # BLD AUTO: 0.6 10*3/MM3 (ref 0.1–0.9)
MONOCYTES # BLD AUTO: 0.7 10*3/MM3 (ref 0.1–0.9)
MONOCYTES # BLD AUTO: 0.7 10*3/MM3 (ref 0.1–0.9)
MONOCYTES # BLD AUTO: 0.8 10*3/MM3 (ref 0.1–0.9)
MONOCYTES # BLD AUTO: 0.98 10*3/MM3 (ref 0.1–0.9)
MONOCYTES # BLD AUTO: 1.34 10*3/MM3 (ref 0.1–0.9)
MONOCYTES NFR BLD AUTO: 2.2 % (ref 5–12)
MONOCYTES NFR BLD AUTO: 6.6 % (ref 5–12)
MONOCYTES NFR BLD AUTO: 6.9 % (ref 5–12)
MONOCYTES NFR BLD AUTO: 6.9 % (ref 5–12)
MONOCYTES NFR BLD AUTO: 7.1 % (ref 5–12)
MONOCYTES NFR BLD AUTO: 7.4 % (ref 5–12)
MONOCYTES NFR BLD AUTO: 8.4 % (ref 5–12)
MONOCYTES NFR BLD AUTO: 8.8 % (ref 5–12)
NEUTROPHILS # BLD AUTO: 20.38 10*3/MM3 (ref 1.7–7)
NEUTROPHILS # BLD AUTO: 22.05 10*3/MM3 (ref 1.7–7)
NEUTROPHILS # BLD AUTO: 22.2 10*3/MM3 (ref 1.7–7)
NEUTROPHILS # BLD AUTO: 5 10*3/MM3 (ref 1.7–7)
NEUTROPHILS # BLD AUTO: 5.9 10*3/MM3 (ref 1.7–7)
NEUTROPHILS # BLD AUTO: 6.1 10*3/MM3 (ref 1.7–7)
NEUTROPHILS # BLD AUTO: 6.2 10*3/MM3 (ref 1.7–7)
NEUTROPHILS # BLD AUTO: 6.3 10*3/MM3 (ref 1.7–7)
NEUTROPHILS # BLD AUTO: 7.1 10*3/MM3 (ref 1.7–7)
NEUTROPHILS # BLD AUTO: 7.3 10*3/MM3 (ref 1.7–7)
NEUTROPHILS NFR BLD AUTO: 68.9 % (ref 42.7–76)
NEUTROPHILS NFR BLD AUTO: 71.4 % (ref 42.7–76)
NEUTROPHILS NFR BLD AUTO: 73.4 % (ref 42.7–76)
NEUTROPHILS NFR BLD AUTO: 74.6 % (ref 42.7–76)
NEUTROPHILS NFR BLD AUTO: 77.1 % (ref 42.7–76)
NEUTROPHILS NFR BLD AUTO: 78 % (ref 42.7–76)
NEUTROPHILS NFR BLD AUTO: 81.7 % (ref 42.7–76)
NEUTROPHILS NFR BLD AUTO: 94.3 % (ref 42.7–76)
NEUTROPHILS NFR BLD MANUAL: 75 % (ref 42.7–76)
NEUTROPHILS NFR BLD MANUAL: 77 % (ref 42.7–76)
NEUTS BAND NFR BLD MANUAL: 13 % (ref 0–5)
NEUTS BAND NFR BLD MANUAL: 16 % (ref 0–5)
NEUTS VAC BLD QL SMEAR: ABNORMAL
NITRITE UR QL STRIP: NEGATIVE
NRBC BLD AUTO-RTO: 0 /100 WBC (ref 0–0.2)
NRBC BLD AUTO-RTO: 0.1 /100 WBC (ref 0–0.2)
NRBC BLD AUTO-RTO: 0.3 /100 WBC (ref 0–0.2)
NRBC BLD AUTO-RTO: 0.3 /100 WBC (ref 0–0.2)
NRBC BLD AUTO-RTO: 0.4 /100 WBC (ref 0–0.2)
NRBC BLD AUTO-RTO: 0.5 /100 WBC (ref 0–0.2)
NRBC SPEC MANUAL: 1 /100 WBC (ref 0–0.2)
NT-PROBNP SERPL-MCNC: 1807 PG/ML (ref 5–1800)
NT-PROBNP SERPL-MCNC: 7318 PG/ML (ref 5–1800)
NT-PROBNP SERPL-MCNC: 7805 PG/ML (ref 5–1800)
NT-PROBNP SERPL-MCNC: 7968 PG/ML (ref 5–1800)
NT-PROBNP SERPL-MCNC: 8150 PG/ML (ref 5–1800)
NT-PROBNP SERPL-MCNC: ABNORMAL PG/ML (ref 5–1800)
PCO2 BLDA: 53.6 MM HG (ref 35–48)
PCO2 BLDA: 53.9 MM HG (ref 35–48)
PCO2 BLDA: 74 MM HG (ref 35–48)
PCO2 BLDA: 74.1 MM HG (ref 35–48)
PH BLDA: 7.32 PH UNITS (ref 7.35–7.45)
PH BLDA: 7.32 PH UNITS (ref 7.35–7.45)
PH BLDA: 7.33 PH UNITS (ref 7.35–7.45)
PH BLDA: 7.46 PH UNITS (ref 7.35–7.45)
PH UR STRIP.AUTO: 5.5 [PH] (ref 5–8)
PH UR STRIP.AUTO: 6.5 [PH] (ref 5–8)
PHOSPHATE SERPL-MCNC: 3.3 MG/DL (ref 2.5–4.5)
PHOSPHATE SERPL-MCNC: 3.7 MG/DL (ref 2.5–4.5)
PHOSPHATE SERPL-MCNC: 3.8 MG/DL (ref 2.5–4.5)
PHOSPHATE SERPL-MCNC: 4.2 MG/DL (ref 2.5–4.5)
PHOSPHATE SERPL-MCNC: 4.2 MG/DL (ref 2.5–4.5)
PHOSPHATE SERPL-MCNC: 4.4 MG/DL (ref 2.5–4.5)
PHOSPHATE SERPL-MCNC: 4.7 MG/DL (ref 2.5–4.5)
PHOSPHATE SERPL-MCNC: 5.3 MG/DL (ref 2.5–4.5)
PHOSPHATE SERPL-MCNC: 5.5 MG/DL (ref 2.5–4.5)
PLATELET # BLD AUTO: 100 10*3/MM3 (ref 140–450)
PLATELET # BLD AUTO: 103 10*3/MM3 (ref 140–450)
PLATELET # BLD AUTO: 110 10*3/MM3 (ref 140–450)
PLATELET # BLD AUTO: 114 10*3/MM3 (ref 140–450)
PLATELET # BLD AUTO: 120 10*3/MM3 (ref 140–450)
PLATELET # BLD AUTO: 121 10*3/MM3 (ref 140–450)
PLATELET # BLD AUTO: 126 10*3/MM3 (ref 140–450)
PLATELET # BLD AUTO: 127 10*3/MM3 (ref 140–450)
PLATELET # BLD AUTO: 148 10*3/MM3 (ref 140–450)
PLATELET # BLD AUTO: 84 10*3/MM3 (ref 140–450)
PLATELET # BLD AUTO: 85 10*3/MM3 (ref 140–450)
PLATELET # BLD AUTO: 97 10*3/MM3 (ref 140–450)
PLATELET # BLD AUTO: 99 10*3/MM3 (ref 140–450)
PLATELET # BLD AUTO: 99 10*3/MM3 (ref 140–450)
PMV BLD AUTO: 10.3 FL (ref 6–12)
PMV BLD AUTO: 8.6 FL (ref 6–12)
PMV BLD AUTO: 8.7 FL (ref 6–12)
PMV BLD AUTO: 9.2 FL (ref 6–12)
PMV BLD AUTO: 9.3 FL (ref 6–12)
PMV BLD AUTO: 9.4 FL (ref 6–12)
PMV BLD AUTO: 9.5 FL (ref 6–12)
PMV BLD AUTO: 9.6 FL (ref 6–12)
PMV BLD AUTO: 9.6 FL (ref 6–12)
PMV BLD AUTO: 9.8 FL (ref 6–12)
PMV BLD AUTO: 9.8 FL (ref 6–12)
PO2 BLDA: 103.1 MM HG (ref 83–108)
PO2 BLDA: 121.1 MM HG (ref 83–108)
PO2 BLDA: 57.7 MM HG (ref 83–108)
PO2 BLDA: 87.7 MM HG (ref 83–108)
POIKILOCYTOSIS BLD QL SMEAR: ABNORMAL
POTASSIUM BLD-SCNC: 3.8 MMOL/L (ref 3.5–5.2)
POTASSIUM BLD-SCNC: 3.8 MMOL/L (ref 3.5–5.2)
POTASSIUM BLD-SCNC: 4.1 MMOL/L (ref 3.5–5.2)
POTASSIUM BLD-SCNC: 4.1 MMOL/L (ref 3.5–5.2)
POTASSIUM BLD-SCNC: 4.2 MMOL/L (ref 3.5–5.2)
POTASSIUM BLD-SCNC: 4.3 MMOL/L (ref 3.5–5.2)
POTASSIUM BLD-SCNC: 4.5 MMOL/L (ref 3.5–5.2)
POTASSIUM BLD-SCNC: 4.6 MMOL/L (ref 3.5–5.2)
POTASSIUM BLD-SCNC: 4.9 MMOL/L (ref 3.5–5.2)
POTASSIUM BLD-SCNC: 5.1 MMOL/L (ref 3.5–5.2)
POTASSIUM BLD-SCNC: 5.2 MMOL/L (ref 3.5–5.2)
POTASSIUM BLDA-SCNC: 3.8 MMOL/L (ref 3.5–4.5)
POTASSIUM BLDA-SCNC: 4 MMOL/L (ref 3.5–4.5)
PROCALCITONIN SERPL-MCNC: 0.09 NG/ML (ref 0.1–0.25)
PROCALCITONIN SERPL-MCNC: 0.11 NG/ML (ref 0.1–0.25)
PROT SERPL-MCNC: 6 G/DL (ref 6–8.5)
PROT SERPL-MCNC: 6.1 G/DL (ref 6–8.5)
PROT SERPL-MCNC: 6.3 G/DL (ref 6–8.5)
PROT SERPL-MCNC: 6.6 G/DL (ref 6–8.5)
PROT SERPL-MCNC: 6.8 G/DL (ref 6–8.5)
PROT SERPL-MCNC: 6.8 G/DL (ref 6–8.5)
PROT SERPL-MCNC: 6.9 G/DL (ref 6–8.5)
PROT SERPL-MCNC: 7.3 G/DL (ref 6–8.5)
PROT SERPL-MCNC: 7.4 G/DL (ref 6–8.5)
PROT UR QL STRIP: ABNORMAL
PROTHROMBIN TIME: 10.3 SECONDS (ref 9.6–11.7)
PROTHROMBIN TIME: 10.8 SECONDS (ref 9.6–11.7)
RBC # BLD AUTO: 2.77 10*6/MM3 (ref 3.77–5.28)
RBC # BLD AUTO: 2.89 10*6/MM3 (ref 3.77–5.28)
RBC # BLD AUTO: 3.05 10*6/MM3 (ref 3.77–5.28)
RBC # BLD AUTO: 3.11 10*6/MM3 (ref 3.77–5.28)
RBC # BLD AUTO: 3.16 10*6/MM3 (ref 3.77–5.28)
RBC # BLD AUTO: 3.16 10*6/MM3 (ref 3.77–5.28)
RBC # BLD AUTO: 3.18 10*6/MM3 (ref 3.77–5.28)
RBC # BLD AUTO: 3.2 10*6/MM3 (ref 3.77–5.28)
RBC # BLD AUTO: 3.24 10*6/MM3 (ref 3.77–5.28)
RBC # BLD AUTO: 3.4 10*6/MM3 (ref 3.77–5.28)
RBC # BLD AUTO: 3.63 10*6/MM3 (ref 3.77–5.28)
RBC # BLD AUTO: 3.92 10*6/MM3 (ref 3.77–5.28)
RBC # UR: ABNORMAL /HPF
REF LAB TEST METHOD: ABNORMAL
RH BLD: NEGATIVE
RH BLD: NEGATIVE
RHINOVIRUS RNA SPEC NAA+PROBE: NOT DETECTED
RHINOVIRUS RNA SPEC NAA+PROBE: NOT DETECTED
RSV RNA NPH QL NAA+NON-PROBE: NOT DETECTED
RSV RNA NPH QL NAA+NON-PROBE: NOT DETECTED
SAO2 % BLDCOA: 85.4 % (ref 94–98)
SAO2 % BLDCOA: 95.3 % (ref 94–98)
SAO2 % BLDCOA: 98.1 % (ref 94–98)
SAO2 % BLDCOA: 98.3 % (ref 94–98)
SCAN SLIDE: NORMAL
SCAN SLIDE: NORMAL
SMALL PLATELETS BLD QL SMEAR: ABNORMAL
SODIUM BLD-SCNC: 137 MMOL/L (ref 136–145)
SODIUM BLD-SCNC: 138 MMOL/L (ref 136–145)
SODIUM BLD-SCNC: 138 MMOL/L (ref 136–145)
SODIUM BLD-SCNC: 139 MMOL/L (ref 136–145)
SODIUM BLD-SCNC: 140 MMOL/L (ref 136–145)
SODIUM BLD-SCNC: 141 MMOL/L (ref 136–145)
SODIUM BLD-SCNC: 142 MMOL/L (ref 136–145)
SODIUM BLD-SCNC: 143 MMOL/L (ref 136–145)
SODIUM BLD-SCNC: 143 MMOL/L (ref 136–145)
SODIUM BLD-SCNC: 145 MMOL/L (ref 136–145)
SODIUM BLDA-SCNC: 139 MMOL/L (ref 138–146)
SODIUM BLDA-SCNC: 145 MMOL/L (ref 138–146)
SP GR UR STRIP: 1.01 (ref 1–1.03)
SQUAMOUS #/AREA URNS HPF: ABNORMAL /HPF
T&S EXPIRATION DATE: NORMAL
T&S EXPIRATION DATE: NORMAL
TRIGL SERPL-MCNC: 109 MG/DL (ref 0–150)
TROPONIN T SERPL-MCNC: 0.04 NG/ML (ref 0–0.03)
TROPONIN T SERPL-MCNC: 0.05 NG/ML (ref 0–0.03)
TROPONIN T SERPL-MCNC: 0.07 NG/ML (ref 0–0.03)
TROPONIN T SERPL-MCNC: 0.12 NG/ML (ref 0–0.03)
TSH SERPL DL<=0.05 MIU/L-ACNC: 10.65 UIU/ML (ref 0.27–4.2)
TSH SERPL DL<=0.05 MIU/L-ACNC: 6.56 UIU/ML (ref 0.27–4.2)
UNIT  ABO: NORMAL
UNIT  RH: NORMAL
URATE SERPL-MCNC: 5.8 MG/DL (ref 2.4–5.7)
UROBILINOGEN UR QL STRIP: ABNORMAL
VARIANT LYMPHS NFR BLD MANUAL: 1 % (ref 0–5)
VIT B12 BLD-MCNC: 566 PG/ML (ref 211–946)
VLDLC SERPL-MCNC: 21.8 MG/DL
WBC MORPH BLD: NORMAL
WBC NRBC COR # BLD: 22.4 10*3/MM3 (ref 3.4–10.8)
WBC NRBC COR # BLD: 23.5 10*3/MM3 (ref 3.4–10.8)
WBC NRBC COR # BLD: 24.5 10*3/MM3 (ref 3.4–10.8)
WBC NRBC COR # BLD: 6.5 10*3/MM3 (ref 3.4–10.8)
WBC NRBC COR # BLD: 6.6 10*3/MM3 (ref 3.4–10.8)
WBC NRBC COR # BLD: 7 10*3/MM3 (ref 3.4–10.8)
WBC NRBC COR # BLD: 7.8 10*3/MM3 (ref 3.4–10.8)
WBC NRBC COR # BLD: 8.1 10*3/MM3 (ref 3.4–10.8)
WBC NRBC COR # BLD: 8.1 10*3/MM3 (ref 3.4–10.8)
WBC NRBC COR # BLD: 8.4 10*3/MM3 (ref 3.4–10.8)
WBC NRBC COR # BLD: 9 10*3/MM3 (ref 3.4–10.8)
WBC NRBC COR # BLD: 9.1 10*3/MM3 (ref 3.4–10.8)
WBC NRBC COR # BLD: 9.5 10*3/MM3 (ref 3.4–10.8)
WBC NRBC COR # BLD: 9.8 10*3/MM3 (ref 3.4–10.8)
WBC UR QL AUTO: ABNORMAL /HPF
WHOLE BLOOD HOLD SPECIMEN: NORMAL

## 2019-01-01 PROCEDURE — 85025 COMPLETE CBC W/AUTO DIFF WBC: CPT | Performed by: INTERNAL MEDICINE

## 2019-01-01 PROCEDURE — 82803 BLOOD GASES ANY COMBINATION: CPT

## 2019-01-01 PROCEDURE — 25010000002 FUROSEMIDE PER 20 MG: Performed by: INTERNAL MEDICINE

## 2019-01-01 PROCEDURE — 87040 BLOOD CULTURE FOR BACTERIA: CPT | Performed by: EMERGENCY MEDICINE

## 2019-01-01 PROCEDURE — 84484 ASSAY OF TROPONIN QUANT: CPT | Performed by: NURSE PRACTITIONER

## 2019-01-01 PROCEDURE — 84443 ASSAY THYROID STIM HORMONE: CPT | Performed by: EMERGENCY MEDICINE

## 2019-01-01 PROCEDURE — 71045 X-RAY EXAM CHEST 1 VIEW: CPT

## 2019-01-01 PROCEDURE — 80053 COMPREHEN METABOLIC PANEL: CPT | Performed by: INTERNAL MEDICINE

## 2019-01-01 PROCEDURE — 87186 SC STD MICRODIL/AGAR DIL: CPT | Performed by: INTERNAL MEDICINE

## 2019-01-01 PROCEDURE — 63710000001 INSULIN LISPRO (HUMAN) PER 5 UNITS: Performed by: NURSE PRACTITIONER

## 2019-01-01 PROCEDURE — 85025 COMPLETE CBC W/AUTO DIFF WBC: CPT | Performed by: NURSE PRACTITIONER

## 2019-01-01 PROCEDURE — 76937 US GUIDE VASCULAR ACCESS: CPT

## 2019-01-01 PROCEDURE — 85025 COMPLETE CBC W/AUTO DIFF WBC: CPT | Performed by: EMERGENCY MEDICINE

## 2019-01-01 PROCEDURE — 76775 US EXAM ABDO BACK WALL LIM: CPT

## 2019-01-01 PROCEDURE — 25010000002 FUROSEMIDE PER 20 MG: Performed by: EMERGENCY MEDICINE

## 2019-01-01 PROCEDURE — 25010000002 DIPHENHYDRAMINE PER 50 MG: Performed by: INTERNAL MEDICINE

## 2019-01-01 PROCEDURE — 99223 1ST HOSP IP/OBS HIGH 75: CPT | Performed by: INTERNAL MEDICINE

## 2019-01-01 PROCEDURE — 25010000002 CEFTRIAXONE PER 250 MG: Performed by: INTERNAL MEDICINE

## 2019-01-01 PROCEDURE — 83880 ASSAY OF NATRIURETIC PEPTIDE: CPT | Performed by: INTERNAL MEDICINE

## 2019-01-01 PROCEDURE — 83540 ASSAY OF IRON: CPT | Performed by: INTERNAL MEDICINE

## 2019-01-01 PROCEDURE — 85610 PROTHROMBIN TIME: CPT | Performed by: NURSE PRACTITIONER

## 2019-01-01 PROCEDURE — 84100 ASSAY OF PHOSPHORUS: CPT | Performed by: INTERNAL MEDICINE

## 2019-01-01 PROCEDURE — 25010000002 HEPARIN (PORCINE) PER 1000 UNITS: Performed by: NURSE PRACTITIONER

## 2019-01-01 PROCEDURE — 83605 ASSAY OF LACTIC ACID: CPT | Performed by: INTERNAL MEDICINE

## 2019-01-01 PROCEDURE — 84100 ASSAY OF PHOSPHORUS: CPT | Performed by: NURSE PRACTITIONER

## 2019-01-01 PROCEDURE — 63710000001 INSULIN GLARGINE PER 5 UNITS: Performed by: NURSE PRACTITIONER

## 2019-01-01 PROCEDURE — 25010000002 CEFEPIME PER 500 MG: Performed by: EMERGENCY MEDICINE

## 2019-01-01 PROCEDURE — 99232 SBSQ HOSP IP/OBS MODERATE 35: CPT | Performed by: INTERNAL MEDICINE

## 2019-01-01 PROCEDURE — 25010000002 CALCIUM GLUCONATE 2-0.675 GM/100ML-% SOLUTION: Performed by: INTERNAL MEDICINE

## 2019-01-01 PROCEDURE — 94799 UNLISTED PULMONARY SVC/PX: CPT

## 2019-01-01 PROCEDURE — 82330 ASSAY OF CALCIUM: CPT | Performed by: INTERNAL MEDICINE

## 2019-01-01 PROCEDURE — 63710000001 INSULIN GLARGINE PER 5 UNITS: Performed by: INTERNAL MEDICINE

## 2019-01-01 PROCEDURE — 82962 GLUCOSE BLOOD TEST: CPT

## 2019-01-01 PROCEDURE — 87340 HEPATITIS B SURFACE AG IA: CPT | Performed by: INTERNAL MEDICINE

## 2019-01-01 PROCEDURE — 83036 HEMOGLOBIN GLYCOSYLATED A1C: CPT | Performed by: INTERNAL MEDICINE

## 2019-01-01 PROCEDURE — 25010000002 MORPHINE PER 10 MG: Performed by: INTERNAL MEDICINE

## 2019-01-01 PROCEDURE — 81001 URINALYSIS AUTO W/SCOPE: CPT | Performed by: INTERNAL MEDICINE

## 2019-01-01 PROCEDURE — 85730 THROMBOPLASTIN TIME PARTIAL: CPT | Performed by: EMERGENCY MEDICINE

## 2019-01-01 PROCEDURE — 25010000002 FUROSEMIDE PER 20 MG: Performed by: NURSE PRACTITIONER

## 2019-01-01 PROCEDURE — 36600 WITHDRAWAL OF ARTERIAL BLOOD: CPT

## 2019-01-01 PROCEDURE — 86140 C-REACTIVE PROTEIN: CPT | Performed by: NURSE PRACTITIONER

## 2019-01-01 PROCEDURE — 80051 ELECTROLYTE PANEL: CPT

## 2019-01-01 PROCEDURE — 86900 BLOOD TYPING SEROLOGIC ABO: CPT | Performed by: EMERGENCY MEDICINE

## 2019-01-01 PROCEDURE — 63710000001 INSULIN LISPRO (HUMAN) PER 5 UNITS: Performed by: INTERNAL MEDICINE

## 2019-01-01 PROCEDURE — 99285 EMERGENCY DEPT VISIT HI MDM: CPT

## 2019-01-01 PROCEDURE — 85027 COMPLETE CBC AUTOMATED: CPT | Performed by: INTERNAL MEDICINE

## 2019-01-01 PROCEDURE — 0 TECHNETIUM ALBUMIN AGGREGATED: Performed by: INTERNAL MEDICINE

## 2019-01-01 PROCEDURE — 25010000002 CEFEPIME PER 500 MG: Performed by: INTERNAL MEDICINE

## 2019-01-01 PROCEDURE — 85730 THROMBOPLASTIN TIME PARTIAL: CPT | Performed by: NURSE PRACTITIONER

## 2019-01-01 PROCEDURE — 83880 ASSAY OF NATRIURETIC PEPTIDE: CPT | Performed by: EMERGENCY MEDICINE

## 2019-01-01 PROCEDURE — 0099U HC BIOFIRE FILMARRAY RESP PANEL 1: CPT | Performed by: NURSE PRACTITIONER

## 2019-01-01 PROCEDURE — 94660 CPAP INITIATION&MGMT: CPT

## 2019-01-01 PROCEDURE — 99238 HOSP IP/OBS DSCHRG MGMT 30/<: CPT | Performed by: HOSPITALIST

## 2019-01-01 PROCEDURE — 84484 ASSAY OF TROPONIN QUANT: CPT | Performed by: EMERGENCY MEDICINE

## 2019-01-01 PROCEDURE — 99231 SBSQ HOSP IP/OBS SF/LOW 25: CPT | Performed by: NURSE PRACTITIONER

## 2019-01-01 PROCEDURE — 99233 SBSQ HOSP IP/OBS HIGH 50: CPT | Performed by: INTERNAL MEDICINE

## 2019-01-01 PROCEDURE — 25010000002 CEFEPIME PER 500 MG: Performed by: NURSE PRACTITIONER

## 2019-01-01 PROCEDURE — 82746 ASSAY OF FOLIC ACID SERUM: CPT | Performed by: INTERNAL MEDICINE

## 2019-01-01 PROCEDURE — 93005 ELECTROCARDIOGRAM TRACING: CPT | Performed by: EMERGENCY MEDICINE

## 2019-01-01 PROCEDURE — 85007 BL SMEAR W/DIFF WBC COUNT: CPT | Performed by: EMERGENCY MEDICINE

## 2019-01-01 PROCEDURE — 99232 SBSQ HOSP IP/OBS MODERATE 35: CPT | Performed by: NURSE PRACTITIONER

## 2019-01-01 PROCEDURE — 94760 N-INVAS EAR/PLS OXIMETRY 1: CPT

## 2019-01-01 PROCEDURE — 85007 BL SMEAR W/DIFF WBC COUNT: CPT | Performed by: INTERNAL MEDICINE

## 2019-01-01 PROCEDURE — 86140 C-REACTIVE PROTEIN: CPT | Performed by: INTERNAL MEDICINE

## 2019-01-01 PROCEDURE — 81001 URINALYSIS AUTO W/SCOPE: CPT | Performed by: EMERGENCY MEDICINE

## 2019-01-01 PROCEDURE — 25010000002 METHYLPREDNISOLONE PER 40 MG: Performed by: NURSE PRACTITIONER

## 2019-01-01 PROCEDURE — 85018 HEMOGLOBIN: CPT

## 2019-01-01 PROCEDURE — 83735 ASSAY OF MAGNESIUM: CPT | Performed by: INTERNAL MEDICINE

## 2019-01-01 PROCEDURE — 86901 BLOOD TYPING SEROLOGIC RH(D): CPT | Performed by: EMERGENCY MEDICINE

## 2019-01-01 PROCEDURE — 36430 TRANSFUSION BLD/BLD COMPNT: CPT

## 2019-01-01 PROCEDURE — 25010000002 LORAZEPAM PER 2 MG: Performed by: INTERNAL MEDICINE

## 2019-01-01 PROCEDURE — 87186 SC STD MICRODIL/AGAR DIL: CPT | Performed by: EMERGENCY MEDICINE

## 2019-01-01 PROCEDURE — 05HM33Z INSERTION OF INFUSION DEVICE INTO RIGHT INTERNAL JUGULAR VEIN, PERCUTANEOUS APPROACH: ICD-10-PCS | Performed by: RADIOLOGY

## 2019-01-01 PROCEDURE — 80053 COMPREHEN METABOLIC PANEL: CPT | Performed by: EMERGENCY MEDICINE

## 2019-01-01 PROCEDURE — 25010000002 HEPARIN (PORCINE) PER 1000 UNITS: Performed by: RADIOLOGY

## 2019-01-01 PROCEDURE — 25010000002 CHLOROTHIAZIDE PER 500 MG: Performed by: INTERNAL MEDICINE

## 2019-01-01 PROCEDURE — 84550 ASSAY OF BLOOD/URIC ACID: CPT | Performed by: INTERNAL MEDICINE

## 2019-01-01 PROCEDURE — 82330 ASSAY OF CALCIUM: CPT

## 2019-01-01 PROCEDURE — 83880 ASSAY OF NATRIURETIC PEPTIDE: CPT

## 2019-01-01 PROCEDURE — 85610 PROTHROMBIN TIME: CPT | Performed by: EMERGENCY MEDICINE

## 2019-01-01 PROCEDURE — 83735 ASSAY OF MAGNESIUM: CPT | Performed by: EMERGENCY MEDICINE

## 2019-01-01 PROCEDURE — 87077 CULTURE AEROBIC IDENTIFY: CPT | Performed by: EMERGENCY MEDICINE

## 2019-01-01 PROCEDURE — 84484 ASSAY OF TROPONIN QUANT: CPT

## 2019-01-01 PROCEDURE — 78582 LUNG VENTILAT&PERFUS IMAGING: CPT

## 2019-01-01 PROCEDURE — 82550 ASSAY OF CK (CPK): CPT | Performed by: INTERNAL MEDICINE

## 2019-01-01 PROCEDURE — 99213 OFFICE O/P EST LOW 20 MIN: CPT | Performed by: INTERNAL MEDICINE

## 2019-01-01 PROCEDURE — C1752 CATH,HEMODIALYSIS,SHORT-TERM: HCPCS

## 2019-01-01 PROCEDURE — 80069 RENAL FUNCTION PANEL: CPT | Performed by: INTERNAL MEDICINE

## 2019-01-01 PROCEDURE — 87150 DNA/RNA AMPLIFIED PROBE: CPT | Performed by: EMERGENCY MEDICINE

## 2019-01-01 PROCEDURE — 84145 PROCALCITONIN (PCT): CPT | Performed by: INTERNAL MEDICINE

## 2019-01-01 PROCEDURE — 83605 ASSAY OF LACTIC ACID: CPT

## 2019-01-01 PROCEDURE — 99239 HOSP IP/OBS DSCHRG MGMT >30: CPT | Performed by: INTERNAL MEDICINE

## 2019-01-01 PROCEDURE — 83735 ASSAY OF MAGNESIUM: CPT | Performed by: NURSE PRACTITIONER

## 2019-01-01 PROCEDURE — 0099U HC BIOFIRE FILMARRAY RESP PANEL 1: CPT | Performed by: EMERGENCY MEDICINE

## 2019-01-01 PROCEDURE — 93005 ELECTROCARDIOGRAM TRACING: CPT | Performed by: NURSE PRACTITIONER

## 2019-01-01 PROCEDURE — A9540 TC99M MAA: HCPCS | Performed by: INTERNAL MEDICINE

## 2019-01-01 PROCEDURE — 70450 CT HEAD/BRAIN W/O DYE: CPT

## 2019-01-01 PROCEDURE — 87086 URINE CULTURE/COLONY COUNT: CPT | Performed by: EMERGENCY MEDICINE

## 2019-01-01 PROCEDURE — 80048 BASIC METABOLIC PNL TOTAL CA: CPT | Performed by: NURSE PRACTITIONER

## 2019-01-01 PROCEDURE — 82728 ASSAY OF FERRITIN: CPT | Performed by: INTERNAL MEDICINE

## 2019-01-01 PROCEDURE — 82607 VITAMIN B-12: CPT | Performed by: INTERNAL MEDICINE

## 2019-01-01 PROCEDURE — 85379 FIBRIN DEGRADATION QUANT: CPT | Performed by: NURSE PRACTITIONER

## 2019-01-01 PROCEDURE — 86900 BLOOD TYPING SEROLOGIC ABO: CPT

## 2019-01-01 PROCEDURE — 71046 X-RAY EXAM CHEST 2 VIEWS: CPT

## 2019-01-01 PROCEDURE — 87077 CULTURE AEROBIC IDENTIFY: CPT | Performed by: INTERNAL MEDICINE

## 2019-01-01 PROCEDURE — 86850 RBC ANTIBODY SCREEN: CPT | Performed by: EMERGENCY MEDICINE

## 2019-01-01 PROCEDURE — P9612 CATHETERIZE FOR URINE SPEC: HCPCS

## 2019-01-01 PROCEDURE — 84443 ASSAY THYROID STIM HORMONE: CPT | Performed by: INTERNAL MEDICINE

## 2019-01-01 PROCEDURE — 82330 ASSAY OF CALCIUM: CPT | Performed by: NURSE PRACTITIONER

## 2019-01-01 PROCEDURE — 86900 BLOOD TYPING SEROLOGIC ABO: CPT | Performed by: INTERNAL MEDICINE

## 2019-01-01 PROCEDURE — 86923 COMPATIBILITY TEST ELECTRIC: CPT

## 2019-01-01 PROCEDURE — 80048 BASIC METABOLIC PNL TOTAL CA: CPT | Performed by: INTERNAL MEDICINE

## 2019-01-01 PROCEDURE — 80061 LIPID PANEL: CPT | Performed by: INTERNAL MEDICINE

## 2019-01-01 PROCEDURE — 77001 FLUOROGUIDE FOR VEIN DEVICE: CPT

## 2019-01-01 PROCEDURE — 99222 1ST HOSP IP/OBS MODERATE 55: CPT | Performed by: NURSE PRACTITIONER

## 2019-01-01 PROCEDURE — 86901 BLOOD TYPING SEROLOGIC RH(D): CPT | Performed by: INTERNAL MEDICINE

## 2019-01-01 PROCEDURE — P9016 RBC LEUKOCYTES REDUCED: HCPCS

## 2019-01-01 PROCEDURE — 86850 RBC ANTIBODY SCREEN: CPT | Performed by: INTERNAL MEDICINE

## 2019-01-01 PROCEDURE — 84145 PROCALCITONIN (PCT): CPT | Performed by: NURSE PRACTITIONER

## 2019-01-01 PROCEDURE — A9538 TC99M PYROPHOSPHATE: HCPCS | Performed by: INTERNAL MEDICINE

## 2019-01-01 PROCEDURE — 87086 URINE CULTURE/COLONY COUNT: CPT | Performed by: INTERNAL MEDICINE

## 2019-01-01 PROCEDURE — 74176 CT ABD & PELVIS W/O CONTRAST: CPT

## 2019-01-01 PROCEDURE — 83036 HEMOGLOBIN GLYCOSYLATED A1C: CPT | Performed by: NURSE PRACTITIONER

## 2019-01-01 PROCEDURE — 71250 CT THORAX DX C-: CPT

## 2019-01-01 PROCEDURE — 85652 RBC SED RATE AUTOMATED: CPT | Performed by: INTERNAL MEDICINE

## 2019-01-01 PROCEDURE — 83880 ASSAY OF NATRIURETIC PEPTIDE: CPT | Performed by: NURSE PRACTITIONER

## 2019-01-01 PROCEDURE — 84484 ASSAY OF TROPONIN QUANT: CPT | Performed by: INTERNAL MEDICINE

## 2019-01-01 PROCEDURE — 93005 ELECTROCARDIOGRAM TRACING: CPT

## 2019-01-01 PROCEDURE — 86901 BLOOD TYPING SEROLOGIC RH(D): CPT

## 2019-01-01 PROCEDURE — 0 TECHNETIUM TC99M PYROPHOSPHATE: Performed by: INTERNAL MEDICINE

## 2019-01-01 RX ORDER — POLYETHYLENE GLYCOL 3350 17 G/17G
17 POWDER, FOR SOLUTION ORAL DAILY
Status: DISCONTINUED | OUTPATIENT
Start: 2019-01-01 | End: 2019-01-01 | Stop reason: HOSPADM

## 2019-01-01 RX ORDER — HEPARIN SODIUM 1000 [USP'U]/ML
INJECTION, SOLUTION INTRAVENOUS; SUBCUTANEOUS
Status: COMPLETED | OUTPATIENT
Start: 2019-01-01 | End: 2019-01-01

## 2019-01-01 RX ORDER — ACETAMINOPHEN 325 MG/1
650 TABLET ORAL ONCE
Status: COMPLETED | OUTPATIENT
Start: 2019-01-01 | End: 2019-01-01

## 2019-01-01 RX ORDER — DIPHENHYDRAMINE HYDROCHLORIDE 50 MG/ML
12.5 INJECTION INTRAMUSCULAR; INTRAVENOUS ONCE
Status: COMPLETED | OUTPATIENT
Start: 2019-01-01 | End: 2019-01-01

## 2019-01-01 RX ORDER — DOXYCYCLINE 100 MG/1
100 TABLET ORAL EVERY 12 HOURS SCHEDULED
Status: COMPLETED | OUTPATIENT
Start: 2019-01-01 | End: 2019-01-01

## 2019-01-01 RX ORDER — SERTRALINE HYDROCHLORIDE 25 MG/1
50 TABLET, FILM COATED ORAL DAILY
COMMUNITY
Start: 2019-01-01 | End: 2019-01-01 | Stop reason: HOSPADM

## 2019-01-01 RX ORDER — SODIUM CHLORIDE 0.9 % (FLUSH) 0.9 %
10 SYRINGE (ML) INJECTION AS NEEDED
Status: DISCONTINUED | OUTPATIENT
Start: 2019-01-01 | End: 2019-01-01 | Stop reason: HOSPADM

## 2019-01-01 RX ORDER — ONDANSETRON 2 MG/ML
4 INJECTION INTRAMUSCULAR; INTRAVENOUS EVERY 6 HOURS PRN
Status: DISCONTINUED | OUTPATIENT
Start: 2019-01-01 | End: 2019-01-01 | Stop reason: HOSPADM

## 2019-01-01 RX ORDER — LEVOTHYROXINE SODIUM 112 UG/1
112 TABLET ORAL DAILY
COMMUNITY
End: 2019-01-01

## 2019-01-01 RX ORDER — FERROUS SULFATE 325(65) MG
325 TABLET ORAL DAILY
COMMUNITY
End: 2019-01-01

## 2019-01-01 RX ORDER — FUROSEMIDE 40 MG/1
80 TABLET ORAL DAILY
Status: DISCONTINUED | OUTPATIENT
Start: 2019-01-01 | End: 2019-01-01

## 2019-01-01 RX ORDER — MORPHINE SULFATE 10 MG/.5ML
5 SOLUTION ORAL
Status: DISCONTINUED | OUTPATIENT
Start: 2019-01-01 | End: 2019-01-01

## 2019-01-01 RX ORDER — ERGOCALCIFEROL 1.25 MG/1
50000 CAPSULE ORAL WEEKLY
COMMUNITY
End: 2019-01-01 | Stop reason: HOSPADM

## 2019-01-01 RX ORDER — ASPIRIN 325 MG
81 TABLET, DELAYED RELEASE (ENTERIC COATED) ORAL EVERY 24 HOURS
COMMUNITY
Start: 2011-12-30 | End: 2019-01-01

## 2019-01-01 RX ORDER — NICOTINE POLACRILEX 4 MG
15 LOZENGE BUCCAL
Status: DISCONTINUED | OUTPATIENT
Start: 2019-01-01 | End: 2019-01-01 | Stop reason: HOSPADM

## 2019-01-01 RX ORDER — ATORVASTATIN CALCIUM 10 MG/1
10 TABLET, FILM COATED ORAL DAILY
Status: DISCONTINUED | OUTPATIENT
Start: 2019-01-01 | End: 2019-01-01 | Stop reason: HOSPADM

## 2019-01-01 RX ORDER — LANOLIN ALCOHOL/MO/W.PET/CERES
3 CREAM (GRAM) TOPICAL NIGHTLY
COMMUNITY
End: 2019-01-01 | Stop reason: HOSPADM

## 2019-01-01 RX ORDER — ONDANSETRON 4 MG/1
4 TABLET, FILM COATED ORAL EVERY 6 HOURS PRN
Status: DISCONTINUED | OUTPATIENT
Start: 2019-01-01 | End: 2019-01-01 | Stop reason: HOSPADM

## 2019-01-01 RX ORDER — ONDANSETRON 4 MG/1
4 TABLET, FILM COATED ORAL EVERY 4 HOURS PRN
Status: DISCONTINUED | OUTPATIENT
Start: 2019-01-01 | End: 2019-01-01

## 2019-01-01 RX ORDER — FUROSEMIDE 10 MG/ML
40 INJECTION INTRAMUSCULAR; INTRAVENOUS ONCE
Status: COMPLETED | OUTPATIENT
Start: 2019-01-01 | End: 2019-01-01

## 2019-01-01 RX ORDER — FERROUS SULFATE TAB EC 324 MG (65 MG FE EQUIVALENT) 324 (65 FE) MG
324 TABLET DELAYED RESPONSE ORAL
Status: DISCONTINUED | OUTPATIENT
Start: 2019-01-01 | End: 2019-01-01

## 2019-01-01 RX ORDER — PANTOPRAZOLE SODIUM 20 MG/1
20 TABLET, DELAYED RELEASE ORAL DAILY
COMMUNITY
End: 2019-01-01 | Stop reason: HOSPADM

## 2019-01-01 RX ORDER — MAGNESIUM SULFATE HEPTAHYDRATE 40 MG/ML
2 INJECTION, SOLUTION INTRAVENOUS AS NEEDED
Status: DISCONTINUED | OUTPATIENT
Start: 2019-01-01 | End: 2019-01-01 | Stop reason: HOSPADM

## 2019-01-01 RX ORDER — DOCUSATE SODIUM 100 MG/1
100 CAPSULE, LIQUID FILLED ORAL 2 TIMES DAILY
COMMUNITY
End: 2019-01-01

## 2019-01-01 RX ORDER — CHOLECALCIFEROL (VITAMIN D3) 125 MCG
5 CAPSULE ORAL NIGHTLY
Status: DISCONTINUED | OUTPATIENT
Start: 2019-01-01 | End: 2019-01-01 | Stop reason: HOSPADM

## 2019-01-01 RX ORDER — ACETAMINOPHEN 160 MG/5ML
650 SOLUTION ORAL EVERY 4 HOURS PRN
Status: DISCONTINUED | OUTPATIENT
Start: 2019-01-01 | End: 2019-01-01 | Stop reason: HOSPADM

## 2019-01-01 RX ORDER — AMMONIUM LACTATE 120 MG/G
1 CREAM TOPICAL NIGHTLY
COMMUNITY
End: 2019-01-01

## 2019-01-01 RX ORDER — ISOSORBIDE MONONITRATE 60 MG/1
120 TABLET, EXTENDED RELEASE ORAL DAILY
Status: DISCONTINUED | OUTPATIENT
Start: 2019-01-01 | End: 2019-01-01

## 2019-01-01 RX ORDER — BISACODYL 5 MG/1
5 TABLET, DELAYED RELEASE ORAL EVERY OTHER DAY
Status: DISCONTINUED | OUTPATIENT
Start: 2019-01-01 | End: 2019-01-01 | Stop reason: HOSPADM

## 2019-01-01 RX ORDER — DOXYCYCLINE 100 MG/1
100 TABLET ORAL EVERY 12 HOURS SCHEDULED
Status: DISCONTINUED | OUTPATIENT
Start: 2019-01-01 | End: 2019-01-01

## 2019-01-01 RX ORDER — SODIUM CHLORIDE 0.9 % (FLUSH) 0.9 %
10 SYRINGE (ML) INJECTION EVERY 12 HOURS SCHEDULED
Status: DISCONTINUED | OUTPATIENT
Start: 2019-01-01 | End: 2019-01-01 | Stop reason: HOSPADM

## 2019-01-01 RX ORDER — FUROSEMIDE 10 MG/ML
80 INJECTION INTRAMUSCULAR; INTRAVENOUS ONCE
Status: DISCONTINUED | OUTPATIENT
Start: 2019-01-01 | End: 2019-01-01

## 2019-01-01 RX ORDER — ONDANSETRON 4 MG/1
4 TABLET, FILM COATED ORAL EVERY 4 HOURS PRN
COMMUNITY

## 2019-01-01 RX ORDER — CEFTRIAXONE 1 G/1
1 INJECTION, POWDER, FOR SOLUTION INTRAMUSCULAR; INTRAVENOUS DAILY
COMMUNITY
Start: 2019-01-01 | End: 2019-01-01 | Stop reason: HOSPADM

## 2019-01-01 RX ORDER — DEXTROSE MONOHYDRATE 25 G/50ML
25 INJECTION, SOLUTION INTRAVENOUS
Status: DISCONTINUED | OUTPATIENT
Start: 2019-01-01 | End: 2019-01-01 | Stop reason: HOSPADM

## 2019-01-01 RX ORDER — ALBUMIN (HUMAN) 12.5 G/50ML
12.5 SOLUTION INTRAVENOUS AS NEEDED
Status: DISCONTINUED | OUTPATIENT
Start: 2019-01-01 | End: 2019-01-01

## 2019-01-01 RX ORDER — KETOTIFEN FUMARATE 0.35 MG/ML
1 SOLUTION/ DROPS OPHTHALMIC 2 TIMES DAILY
Status: DISCONTINUED | OUTPATIENT
Start: 2019-01-01 | End: 2019-01-01 | Stop reason: HOSPADM

## 2019-01-01 RX ORDER — ISOSORBIDE MONONITRATE 120 MG/1
120 TABLET, EXTENDED RELEASE ORAL DAILY
COMMUNITY
Start: 2019-01-01 | End: 2019-01-01 | Stop reason: HOSPADM

## 2019-01-01 RX ORDER — ATORVASTATIN CALCIUM 10 MG/1
TABLET, FILM COATED ORAL
COMMUNITY
Start: 2019-01-01 | End: 2019-01-01

## 2019-01-01 RX ORDER — TRIAMCINOLONE ACETONIDE 1 MG/G
1 CREAM TOPICAL 2 TIMES DAILY
COMMUNITY

## 2019-01-01 RX ORDER — MORPHINE SULFATE 10 MG/.5ML
10 SOLUTION ORAL
Status: DISCONTINUED | OUTPATIENT
Start: 2019-01-01 | End: 2019-01-01 | Stop reason: HOSPADM

## 2019-01-01 RX ORDER — BUMETANIDE 2 MG/1
2 TABLET ORAL 2 TIMES DAILY
COMMUNITY
End: 2019-01-01 | Stop reason: HOSPADM

## 2019-01-01 RX ORDER — NITROGLYCERIN 0.4 MG/1
TABLET SUBLINGUAL
COMMUNITY
Start: 2012-11-12 | End: 2019-01-01

## 2019-01-01 RX ORDER — FUROSEMIDE 10 MG/ML
20 INJECTION INTRAMUSCULAR; INTRAVENOUS ONCE
Status: COMPLETED | OUTPATIENT
Start: 2019-01-01 | End: 2019-01-01

## 2019-01-01 RX ORDER — SODIUM PHOSPHATE, DIBASIC AND SODIUM PHOSPHATE, MONOBASIC 7; 19 G/133ML; G/133ML
1 ENEMA RECTAL DAILY PRN
COMMUNITY

## 2019-01-01 RX ORDER — ASCORBIC ACID 500 MG
1000 TABLET ORAL DAILY
COMMUNITY
End: 2019-01-01 | Stop reason: HOSPADM

## 2019-01-01 RX ORDER — POTASSIUM CHLORIDE 1.5 G/1.77G
40 POWDER, FOR SOLUTION ORAL AS NEEDED
Status: DISCONTINUED | OUTPATIENT
Start: 2019-01-01 | End: 2019-01-01 | Stop reason: HOSPADM

## 2019-01-01 RX ORDER — IPRATROPIUM BROMIDE AND ALBUTEROL SULFATE 2.5; .5 MG/3ML; MG/3ML
3 SOLUTION RESPIRATORY (INHALATION)
Status: DISCONTINUED | OUTPATIENT
Start: 2019-01-01 | End: 2019-01-01 | Stop reason: HOSPADM

## 2019-01-01 RX ORDER — FERROUS SULFATE TAB EC 324 MG (65 MG FE EQUIVALENT) 324 (65 FE) MG
324 TABLET DELAYED RESPONSE ORAL
Status: DISCONTINUED | OUTPATIENT
Start: 2019-01-01 | End: 2019-01-01 | Stop reason: HOSPADM

## 2019-01-01 RX ORDER — AMMONIUM LACTATE 12 G/100G
CREAM TOPICAL
COMMUNITY
Start: 2019-01-01 | End: 2019-01-01

## 2019-01-01 RX ORDER — ACETAMINOPHEN 325 MG/1
325 TABLET ORAL EVERY 6 HOURS PRN
Status: DISCONTINUED | OUTPATIENT
Start: 2019-01-01 | End: 2019-01-01 | Stop reason: HOSPADM

## 2019-01-01 RX ORDER — KETOTIFEN FUMARATE 0.35 MG/ML
1 SOLUTION/ DROPS OPHTHALMIC 2 TIMES DAILY
COMMUNITY
Start: 2019-01-01 | End: 2019-01-01

## 2019-01-01 RX ORDER — MELATONIN
1000 DAILY
Status: DISCONTINUED | OUTPATIENT
Start: 2019-01-01 | End: 2019-01-01 | Stop reason: HOSPADM

## 2019-01-01 RX ORDER — OXYBUTYNIN CHLORIDE 5 MG/1
5 TABLET, EXTENDED RELEASE ORAL DAILY
Status: DISCONTINUED | OUTPATIENT
Start: 2019-01-01 | End: 2019-01-01

## 2019-01-01 RX ORDER — METOLAZONE 5 MG/1
5 TABLET ORAL ONCE
Status: COMPLETED | OUTPATIENT
Start: 2019-01-01 | End: 2019-01-01

## 2019-01-01 RX ORDER — ACETAMINOPHEN 325 MG/1
325 TABLET ORAL EVERY 6 HOURS PRN
Status: DISCONTINUED | OUTPATIENT
Start: 2019-01-01 | End: 2019-01-01

## 2019-01-01 RX ORDER — BISACODYL 10 MG
10 SUPPOSITORY, RECTAL RECTAL DAILY PRN
Status: DISCONTINUED | OUTPATIENT
Start: 2019-01-01 | End: 2019-01-01 | Stop reason: HOSPADM

## 2019-01-01 RX ORDER — FEBUXOSTAT 40 MG/1
40 TABLET, FILM COATED ORAL DAILY
COMMUNITY
Start: 2019-01-01 | End: 2019-01-01

## 2019-01-01 RX ORDER — ASCORBIC ACID 500 MG
1000 TABLET ORAL DAILY
Status: DISCONTINUED | OUTPATIENT
Start: 2019-01-01 | End: 2019-01-01

## 2019-01-01 RX ORDER — BISACODYL 5 MG/1
10 TABLET, DELAYED RELEASE ORAL NIGHTLY PRN
Status: DISCONTINUED | OUTPATIENT
Start: 2019-01-01 | End: 2019-01-01 | Stop reason: HOSPADM

## 2019-01-01 RX ORDER — SODIUM CHLORIDE 9 MG/ML
100 INJECTION, SOLUTION INTRAVENOUS CONTINUOUS
Status: DISCONTINUED | OUTPATIENT
Start: 2019-01-01 | End: 2019-01-01

## 2019-01-01 RX ORDER — HYDRALAZINE HYDROCHLORIDE 25 MG/1
25 TABLET, FILM COATED ORAL 3 TIMES DAILY
COMMUNITY
End: 2019-01-01 | Stop reason: HOSPADM

## 2019-01-01 RX ORDER — BUMETANIDE 1 MG/1
2 TABLET ORAL 3 TIMES DAILY
Status: DISCONTINUED | OUTPATIENT
Start: 2019-01-01 | End: 2019-01-01 | Stop reason: HOSPADM

## 2019-01-01 RX ORDER — METHYLPREDNISOLONE SODIUM SUCCINATE 40 MG/ML
40 INJECTION, POWDER, LYOPHILIZED, FOR SOLUTION INTRAMUSCULAR; INTRAVENOUS EVERY 8 HOURS
Status: DISCONTINUED | OUTPATIENT
Start: 2019-01-01 | End: 2019-01-01

## 2019-01-01 RX ORDER — DOCUSATE SODIUM 100 MG/1
100 CAPSULE, LIQUID FILLED ORAL 2 TIMES DAILY
Status: DISCONTINUED | OUTPATIENT
Start: 2019-01-01 | End: 2019-01-01 | Stop reason: HOSPADM

## 2019-01-01 RX ORDER — GABAPENTIN 300 MG/1
300 CAPSULE ORAL 2 TIMES DAILY
Status: DISCONTINUED | OUTPATIENT
Start: 2019-01-01 | End: 2019-01-01

## 2019-01-01 RX ORDER — FUROSEMIDE 40 MG/1
TABLET ORAL
COMMUNITY
Start: 2016-02-08 | End: 2019-01-01

## 2019-01-01 RX ORDER — CEFDINIR 300 MG/1
300 CAPSULE ORAL 2 TIMES DAILY
COMMUNITY
Start: 2019-01-01 | End: 2019-01-01 | Stop reason: HOSPADM

## 2019-01-01 RX ORDER — BUMETANIDE 1 MG/1
2 TABLET ORAL
Status: DISCONTINUED | OUTPATIENT
Start: 2019-01-01 | End: 2019-01-01

## 2019-01-01 RX ORDER — METHADONE HYDROCHLORIDE 10 MG/1
10 TABLET ORAL 3 TIMES DAILY
Status: DISCONTINUED | OUTPATIENT
Start: 2019-01-01 | End: 2019-01-01 | Stop reason: HOSPADM

## 2019-01-01 RX ORDER — BUSPIRONE HYDROCHLORIDE 7.5 MG/1
7.5 TABLET ORAL 3 TIMES DAILY
COMMUNITY
Start: 2019-01-01 | End: 2019-01-01 | Stop reason: HOSPADM

## 2019-01-01 RX ORDER — ERGOCALCIFEROL 1.25 MG/1
CAPSULE ORAL
COMMUNITY
Start: 2019-01-01 | End: 2019-01-01

## 2019-01-01 RX ORDER — CALCIUM GLUCONATE 20 MG/ML
1 INJECTION, SOLUTION INTRAVENOUS AS NEEDED
Status: DISCONTINUED | OUTPATIENT
Start: 2019-01-01 | End: 2019-01-01 | Stop reason: HOSPADM

## 2019-01-01 RX ORDER — ECHINACEA PURPUREA EXTRACT 125 MG
2 TABLET ORAL AS NEEDED
Status: DISCONTINUED | OUTPATIENT
Start: 2019-01-01 | End: 2019-01-01 | Stop reason: HOSPADM

## 2019-01-01 RX ORDER — HYOSCYAMINE SULFATE 0.12 MG/ML
0.12 LIQUID ORAL EVERY 6 HOURS PRN
Status: DISCONTINUED | OUTPATIENT
Start: 2019-01-01 | End: 2019-01-01 | Stop reason: HOSPADM

## 2019-01-01 RX ORDER — ZINC OXIDE 20 %
1 OINTMENT (GRAM) TOPICAL 2 TIMES DAILY
COMMUNITY
End: 2019-01-01

## 2019-01-01 RX ORDER — BUSPIRONE HYDROCHLORIDE 15 MG/1
7.5 TABLET ORAL 3 TIMES DAILY
Status: DISCONTINUED | OUTPATIENT
Start: 2019-01-01 | End: 2019-01-01

## 2019-01-01 RX ORDER — LEVOTHYROXINE SODIUM 112 UG/1
112 TABLET ORAL DAILY
Status: DISCONTINUED | OUTPATIENT
Start: 2019-01-01 | End: 2019-01-01 | Stop reason: HOSPADM

## 2019-01-01 RX ORDER — CALCIUM GLUCONATE 20 MG/ML
2 INJECTION, SOLUTION INTRAVENOUS EVERY 12 HOURS
Status: DISPENSED | OUTPATIENT
Start: 2019-01-01 | End: 2019-01-01

## 2019-01-01 RX ORDER — FAMOTIDINE 20 MG/1
40 TABLET, FILM COATED ORAL DAILY
Status: DISCONTINUED | OUTPATIENT
Start: 2019-01-01 | End: 2019-01-01

## 2019-01-01 RX ORDER — MAGNESIUM SULFATE HEPTAHYDRATE 40 MG/ML
4 INJECTION, SOLUTION INTRAVENOUS AS NEEDED
Status: DISCONTINUED | OUTPATIENT
Start: 2019-01-01 | End: 2019-01-01 | Stop reason: HOSPADM

## 2019-01-01 RX ORDER — HEPARIN SODIUM 5000 [USP'U]/ML
5000 INJECTION, SOLUTION INTRAVENOUS; SUBCUTANEOUS EVERY 12 HOURS SCHEDULED
Status: DISCONTINUED | OUTPATIENT
Start: 2019-01-01 | End: 2019-01-01

## 2019-01-01 RX ORDER — GUAIFENESIN AND DEXTROMETHORPHAN HYDROBROMIDE 100; 10 MG/5ML; MG/5ML
10 SOLUTION ORAL EVERY 6 HOURS
COMMUNITY
Start: 2019-01-01 | End: 2019-01-01

## 2019-01-01 RX ORDER — ATENOLOL 25 MG/1
TABLET ORAL EVERY 24 HOURS
COMMUNITY
Start: 2019-01-01 | End: 2019-01-01

## 2019-01-01 RX ORDER — SENNOSIDES 8.6 MG
CAPSULE ORAL
COMMUNITY
Start: 2019-01-01 | End: 2019-01-01

## 2019-01-01 RX ORDER — METHADONE HYDROCHLORIDE 10 MG/1
10 TABLET ORAL 2 TIMES DAILY
Status: DISCONTINUED | OUTPATIENT
Start: 2019-01-01 | End: 2019-01-01

## 2019-01-01 RX ORDER — METHADONE HYDROCHLORIDE 10 MG/1
10 TABLET ORAL 3 TIMES DAILY
Qty: 9 TABLET | Refills: 0 | Status: SHIPPED | OUTPATIENT
Start: 2019-01-01 | End: 2019-01-01 | Stop reason: HOSPADM

## 2019-01-01 RX ORDER — CEFDINIR 300 MG/1
300 CAPSULE ORAL EVERY 12 HOURS SCHEDULED
Status: DISCONTINUED | OUTPATIENT
Start: 2019-01-01 | End: 2019-01-01

## 2019-01-01 RX ORDER — POLYETHYLENE GLYCOL 3350 17 G/17G
17 POWDER, FOR SOLUTION ORAL DAILY PRN
Status: DISCONTINUED | OUTPATIENT
Start: 2019-01-01 | End: 2019-01-01 | Stop reason: HOSPADM

## 2019-01-01 RX ORDER — PANTOPRAZOLE SODIUM 40 MG/1
40 TABLET, DELAYED RELEASE ORAL DAILY
Status: DISCONTINUED | OUTPATIENT
Start: 2019-01-01 | End: 2019-01-01 | Stop reason: HOSPADM

## 2019-01-01 RX ORDER — BISACODYL 5 MG/1
10 TABLET, DELAYED RELEASE ORAL DAILY PRN
COMMUNITY

## 2019-01-01 RX ORDER — WATER / MINERAL OIL / WHITE PETROLATUM 16 OZ
CREAM TOPICAL
COMMUNITY
Start: 2019-01-01 | End: 2019-01-01

## 2019-01-01 RX ORDER — ZINC OXIDE 20 %
1 OINTMENT (GRAM) TOPICAL 2 TIMES DAILY
Status: DISCONTINUED | OUTPATIENT
Start: 2019-01-01 | End: 2019-01-01 | Stop reason: HOSPADM

## 2019-01-01 RX ORDER — ASPIRIN 81 MG/1
81 TABLET, CHEWABLE ORAL DAILY
COMMUNITY
End: 2019-01-01

## 2019-01-01 RX ORDER — MORPHINE SULFATE 4 MG/ML
2 INJECTION, SOLUTION INTRAMUSCULAR; INTRAVENOUS
Status: DISCONTINUED | OUTPATIENT
Start: 2019-01-01 | End: 2019-01-01 | Stop reason: HOSPADM

## 2019-01-01 RX ORDER — CHOLECALCIFEROL (VITAMIN D3) 125 MCG
5 CAPSULE ORAL NIGHTLY PRN
Status: DISCONTINUED | OUTPATIENT
Start: 2019-01-01 | End: 2019-01-01 | Stop reason: HOSPADM

## 2019-01-01 RX ORDER — ACETAMINOPHEN 325 MG/1
650 TABLET ORAL EVERY 4 HOURS PRN
Status: DISCONTINUED | OUTPATIENT
Start: 2019-01-01 | End: 2019-01-01 | Stop reason: HOSPADM

## 2019-01-01 RX ORDER — ACETAMINOPHEN 650 MG/1
650 SUPPOSITORY RECTAL ONCE
Status: COMPLETED | OUTPATIENT
Start: 2019-01-01 | End: 2019-01-01

## 2019-01-01 RX ORDER — PANTOPRAZOLE SODIUM 40 MG/1
40 TABLET, DELAYED RELEASE ORAL DAILY
Start: 2019-01-01 | End: 2019-01-01

## 2019-01-01 RX ORDER — FUROSEMIDE 10 MG/ML
40 INJECTION INTRAMUSCULAR; INTRAVENOUS EVERY 12 HOURS
Status: DISCONTINUED | OUTPATIENT
Start: 2019-01-01 | End: 2019-01-01

## 2019-01-01 RX ORDER — PANTOPRAZOLE SODIUM 40 MG/1
TABLET, DELAYED RELEASE ORAL
COMMUNITY
Start: 2015-08-05 | End: 2019-01-01

## 2019-01-01 RX ORDER — ASPIRIN 81 MG/1
81 TABLET, CHEWABLE ORAL DAILY
Status: DISCONTINUED | OUTPATIENT
Start: 2019-01-01 | End: 2019-01-01 | Stop reason: HOSPADM

## 2019-01-01 RX ORDER — FUROSEMIDE 40 MG/1
40 TABLET ORAL DAILY
Status: DISCONTINUED | OUTPATIENT
Start: 2019-01-01 | End: 2019-01-01

## 2019-01-01 RX ORDER — NITROGLYCERIN 0.4 MG/1
0.4 TABLET SUBLINGUAL
Status: DISCONTINUED | OUTPATIENT
Start: 2019-01-01 | End: 2019-01-01 | Stop reason: HOSPADM

## 2019-01-01 RX ORDER — GABAPENTIN 300 MG/1
300 CAPSULE ORAL 2 TIMES DAILY
Status: ON HOLD | COMMUNITY
End: 2019-01-01

## 2019-01-01 RX ORDER — NICOTINE 21 MG/24HR
1 PATCH, TRANSDERMAL 24 HOURS TRANSDERMAL EVERY 24 HOURS
Status: DISCONTINUED | OUTPATIENT
Start: 2019-01-01 | End: 2019-01-01

## 2019-01-01 RX ORDER — ASCORBIC ACID 500 MG
1000 TABLET ORAL DAILY
Status: DISCONTINUED | OUTPATIENT
Start: 2019-01-01 | End: 2019-01-01 | Stop reason: HOSPADM

## 2019-01-01 RX ORDER — IPRATROPIUM BROMIDE AND ALBUTEROL SULFATE 2.5; .5 MG/3ML; MG/3ML
3 SOLUTION RESPIRATORY (INHALATION)
Status: DISCONTINUED | OUTPATIENT
Start: 2019-01-01 | End: 2019-01-01

## 2019-01-01 RX ORDER — CEFDINIR 300 MG/1
300 CAPSULE ORAL 2 TIMES DAILY
Qty: 16 CAPSULE | Refills: 0
Start: 2019-01-01 | End: 2019-01-01

## 2019-01-01 RX ORDER — FUROSEMIDE 80 MG
80 TABLET ORAL DAILY
Start: 2019-01-01 | End: 2019-01-01 | Stop reason: HOSPADM

## 2019-01-01 RX ORDER — CEFTRIAXONE 1 G/1
1 INJECTION, POWDER, FOR SOLUTION INTRAMUSCULAR; INTRAVENOUS DAILY
Status: DISCONTINUED | OUTPATIENT
Start: 2019-01-01 | End: 2019-01-01 | Stop reason: CLARIF

## 2019-01-01 RX ORDER — INSULIN GLARGINE 100 [IU]/ML
30 INJECTION, SOLUTION SUBCUTANEOUS 2 TIMES DAILY
COMMUNITY
End: 2019-01-01

## 2019-01-01 RX ORDER — METHADONE HYDROCHLORIDE 10 MG/1
10 TABLET ORAL 3 TIMES DAILY
Status: ON HOLD | COMMUNITY
End: 2019-01-01 | Stop reason: SDUPTHER

## 2019-01-01 RX ORDER — ASPIRIN 325 MG
325 TABLET ORAL ONCE
Status: COMPLETED | OUTPATIENT
Start: 2019-01-01 | End: 2019-01-01

## 2019-01-01 RX ORDER — MORPHINE SULFATE 20 MG/5ML
5 SOLUTION ORAL
Qty: 100 ML | Refills: 0 | Status: SHIPPED | OUTPATIENT
Start: 2019-01-01

## 2019-01-01 RX ORDER — CALCIUM GLUCONATE 20 MG/ML
1 INJECTION, SOLUTION INTRAVENOUS ONCE
Status: DISCONTINUED | OUTPATIENT
Start: 2019-01-01 | End: 2019-01-01

## 2019-01-01 RX ORDER — INSULIN GLARGINE 100 [IU]/ML
30 INJECTION, SOLUTION SUBCUTANEOUS NIGHTLY
Status: DISCONTINUED | OUTPATIENT
Start: 2019-01-01 | End: 2019-01-01 | Stop reason: HOSPADM

## 2019-01-01 RX ORDER — FEBUXOSTAT 40 MG/1
40 TABLET, FILM COATED ORAL DAILY
Status: DISCONTINUED | OUTPATIENT
Start: 2019-01-01 | End: 2019-01-01

## 2019-01-01 RX ORDER — BUSPIRONE HYDROCHLORIDE 10 MG/1
5 TABLET ORAL 3 TIMES DAILY
Status: DISCONTINUED | OUTPATIENT
Start: 2019-01-01 | End: 2019-01-01 | Stop reason: HOSPADM

## 2019-01-01 RX ORDER — IPRATROPIUM BROMIDE AND ALBUTEROL SULFATE 2.5; .5 MG/3ML; MG/3ML
3 SOLUTION RESPIRATORY (INHALATION) EVERY 4 HOURS PRN
Status: DISCONTINUED | OUTPATIENT
Start: 2019-01-01 | End: 2019-01-01 | Stop reason: HOSPADM

## 2019-01-01 RX ORDER — ATORVASTATIN CALCIUM 10 MG/1
10 TABLET, FILM COATED ORAL NIGHTLY
COMMUNITY
End: 2019-01-01 | Stop reason: HOSPADM

## 2019-01-01 RX ORDER — METHADONE HYDROCHLORIDE 10 MG/1
10 TABLET ORAL 3 TIMES DAILY
COMMUNITY

## 2019-01-01 RX ORDER — ACETAMINOPHEN 650 MG/1
650 SUPPOSITORY RECTAL EVERY 4 HOURS PRN
Status: DISCONTINUED | OUTPATIENT
Start: 2019-01-01 | End: 2019-01-01 | Stop reason: HOSPADM

## 2019-01-01 RX ORDER — GABAPENTIN 300 MG/1
300 CAPSULE ORAL NIGHTLY
Status: DISCONTINUED | OUTPATIENT
Start: 2019-01-01 | End: 2019-01-01 | Stop reason: HOSPADM

## 2019-01-01 RX ORDER — DOCUSATE SODIUM 100 MG/1
CAPSULE, LIQUID FILLED ORAL
COMMUNITY
Start: 2019-01-01 | End: 2019-01-01

## 2019-01-01 RX ORDER — ALUMINA, MAGNESIA, AND SIMETHICONE 2400; 2400; 240 MG/30ML; MG/30ML; MG/30ML
15 SUSPENSION ORAL EVERY 6 HOURS PRN
Status: DISCONTINUED | OUTPATIENT
Start: 2019-01-01 | End: 2019-01-01 | Stop reason: HOSPADM

## 2019-01-01 RX ORDER — BUSPIRONE HYDROCHLORIDE 10 MG/1
10 TABLET ORAL 3 TIMES DAILY
COMMUNITY
End: 2019-01-01 | Stop reason: HOSPADM

## 2019-01-01 RX ORDER — LORAZEPAM 2 MG/ML
1 INJECTION INTRAMUSCULAR EVERY 4 HOURS PRN
Status: DISCONTINUED | OUTPATIENT
Start: 2019-01-01 | End: 2019-01-01 | Stop reason: HOSPADM

## 2019-01-01 RX ORDER — FUROSEMIDE 40 MG/1
80 TABLET ORAL
Status: DISCONTINUED | OUTPATIENT
Start: 2019-01-01 | End: 2019-01-01 | Stop reason: HOSPADM

## 2019-01-01 RX ORDER — FUROSEMIDE 10 MG/ML
40 INJECTION INTRAMUSCULAR; INTRAVENOUS EVERY 8 HOURS
Status: DISCONTINUED | OUTPATIENT
Start: 2019-01-01 | End: 2019-01-01

## 2019-01-01 RX ORDER — ACETAMINOPHEN 325 MG/1
325 TABLET ORAL EVERY 6 HOURS PRN
COMMUNITY
End: 2019-01-01

## 2019-01-01 RX ORDER — BUMETANIDE 2 MG/1
2 TABLET ORAL 3 TIMES DAILY
Start: 2019-01-01 | End: 2019-01-01

## 2019-01-01 RX ORDER — METHADONE HYDROCHLORIDE 10 MG/1
TABLET ORAL
COMMUNITY
Start: 2011-09-27 | End: 2019-01-01

## 2019-01-01 RX ORDER — ATORVASTATIN CALCIUM 10 MG/1
10 TABLET, FILM COATED ORAL NIGHTLY
Status: DISCONTINUED | OUTPATIENT
Start: 2019-01-01 | End: 2019-01-01

## 2019-01-01 RX ORDER — HALOPERIDOL 5 MG/ML
2 INJECTION INTRAMUSCULAR EVERY 6 HOURS PRN
Status: DISCONTINUED | OUTPATIENT
Start: 2019-01-01 | End: 2019-01-01 | Stop reason: HOSPADM

## 2019-01-01 RX ORDER — PANTOPRAZOLE SODIUM 40 MG/1
40 TABLET, DELAYED RELEASE ORAL EVERY MORNING
Status: DISCONTINUED | OUTPATIENT
Start: 2019-01-01 | End: 2019-01-01 | Stop reason: HOSPADM

## 2019-01-01 RX ORDER — INSULIN GLARGINE 100 [IU]/ML
30 INJECTION, SOLUTION SUBCUTANEOUS EVERY 12 HOURS SCHEDULED
Status: DISCONTINUED | OUTPATIENT
Start: 2019-01-01 | End: 2019-01-01 | Stop reason: HOSPADM

## 2019-01-01 RX ORDER — HYDRALAZINE HYDROCHLORIDE 25 MG/1
25 TABLET, FILM COATED ORAL 3 TIMES DAILY
Status: DISCONTINUED | OUTPATIENT
Start: 2019-01-01 | End: 2019-01-01 | Stop reason: HOSPADM

## 2019-01-01 RX ORDER — IPRATROPIUM BROMIDE AND ALBUTEROL SULFATE 2.5; .5 MG/3ML; MG/3ML
3 SOLUTION RESPIRATORY (INHALATION)
Qty: 360 ML
Start: 2019-01-01 | End: 2019-01-01 | Stop reason: HOSPADM

## 2019-01-01 RX ORDER — ACETAMINOPHEN 650 MG/1
SUPPOSITORY RECTAL
Status: COMPLETED
Start: 2019-01-01 | End: 2019-01-01

## 2019-01-01 RX ORDER — GABAPENTIN 300 MG/1
300 CAPSULE ORAL 2 TIMES DAILY
COMMUNITY
End: 2019-01-01

## 2019-01-01 RX ORDER — GLIPIZIDE 10 MG/1
TABLET ORAL EVERY 24 HOURS
COMMUNITY
Start: 2019-01-01 | End: 2019-01-01

## 2019-01-01 RX ORDER — HYOSCYAMINE SULFATE 0.125 MG
0.12 TABLET ORAL EVERY 6 HOURS PRN
COMMUNITY
End: 2019-01-01 | Stop reason: HOSPADM

## 2019-01-01 RX ORDER — ISOSORBIDE MONONITRATE 60 MG/1
120 TABLET, EXTENDED RELEASE ORAL DAILY
Status: DISCONTINUED | OUTPATIENT
Start: 2019-01-01 | End: 2019-01-01 | Stop reason: HOSPADM

## 2019-01-01 RX ORDER — AMOXICILLIN 250 MG
2 CAPSULE ORAL
COMMUNITY
End: 2019-01-01 | Stop reason: HOSPADM

## 2019-01-01 RX ORDER — AMMONIUM LACTATE 12 G/100G
1 CREAM TOPICAL DAILY
Status: DISCONTINUED | OUTPATIENT
Start: 2019-01-01 | End: 2019-01-01 | Stop reason: HOSPADM

## 2019-01-01 RX ORDER — DOXYCYCLINE HYCLATE 100 MG/1
100 CAPSULE ORAL 2 TIMES DAILY
COMMUNITY
Start: 2019-01-01 | End: 2019-01-01 | Stop reason: HOSPADM

## 2019-01-01 RX ORDER — CEFDINIR 300 MG/1
300 CAPSULE ORAL EVERY OTHER DAY
Status: DISCONTINUED | OUTPATIENT
Start: 2019-01-01 | End: 2019-01-01

## 2019-01-01 RX ORDER — POTASSIUM CHLORIDE 20 MEQ/1
40 TABLET, EXTENDED RELEASE ORAL AS NEEDED
Status: DISCONTINUED | OUTPATIENT
Start: 2019-01-01 | End: 2019-01-01 | Stop reason: HOSPADM

## 2019-01-01 RX ORDER — HYDRALAZINE HYDROCHLORIDE 25 MG/1
25 TABLET, FILM COATED ORAL 3 TIMES DAILY
Status: DISCONTINUED | OUTPATIENT
Start: 2019-01-01 | End: 2019-01-01

## 2019-01-01 RX ORDER — METOLAZONE 5 MG/1
10 TABLET ORAL ONCE
Status: COMPLETED | OUTPATIENT
Start: 2019-01-01 | End: 2019-01-01

## 2019-01-01 RX ORDER — OXYBUTYNIN CHLORIDE 5 MG/1
5 TABLET, EXTENDED RELEASE ORAL DAILY
Status: DISCONTINUED | OUTPATIENT
Start: 2019-01-01 | End: 2019-01-01 | Stop reason: HOSPADM

## 2019-01-01 RX ORDER — DOCUSATE SODIUM 100 MG/1
100 CAPSULE, LIQUID FILLED ORAL 2 TIMES DAILY PRN
Status: DISCONTINUED | OUTPATIENT
Start: 2019-01-01 | End: 2019-01-01 | Stop reason: HOSPADM

## 2019-01-01 RX ORDER — FEBUXOSTAT 40 MG/1
40 TABLET, FILM COATED ORAL DAILY
Status: DISCONTINUED | OUTPATIENT
Start: 2019-01-01 | End: 2019-01-01 | Stop reason: HOSPADM

## 2019-01-01 RX ORDER — ECHINACEA PURPUREA EXTRACT 125 MG
2 TABLET ORAL AS NEEDED
Refills: 12
Start: 2019-01-01

## 2019-01-01 RX ORDER — AMMONIUM LACTATE 120 MG/G
1 CREAM TOPICAL NIGHTLY
Status: DISCONTINUED | OUTPATIENT
Start: 2019-01-01 | End: 2019-01-01 | Stop reason: HOSPADM

## 2019-01-01 RX ORDER — ZOLPIDEM TARTRATE 5 MG/1
TABLET ORAL EVERY 24 HOURS
COMMUNITY
Start: 2019-01-01 | End: 2019-01-01

## 2019-01-01 RX ORDER — IPRATROPIUM BROMIDE AND ALBUTEROL SULFATE 2.5; .5 MG/3ML; MG/3ML
3 SOLUTION RESPIRATORY (INHALATION)
Qty: 360 ML
Start: 2019-01-01 | End: 2019-01-01

## 2019-01-01 RX ORDER — POLYETHYLENE GLYCOL 3350 17 G/17G
17 POWDER, FOR SOLUTION ORAL DAILY PRN
COMMUNITY

## 2019-01-01 RX ADMIN — DOXYCYCLINE 100 MG: 100 TABLET, FILM COATED ORAL at 22:12

## 2019-01-01 RX ADMIN — METOPROLOL TARTRATE 25 MG: 25 TABLET, FILM COATED ORAL at 09:43

## 2019-01-01 RX ADMIN — FUROSEMIDE 80 MG: 40 TABLET ORAL at 18:11

## 2019-01-01 RX ADMIN — SERTRALINE HYDROCHLORIDE 25 MG: 50 TABLET ORAL at 10:32

## 2019-01-01 RX ADMIN — KETOTIFEN FUMARATE 1 DROP: 0.35 SOLUTION/ DROPS OPHTHALMIC at 09:32

## 2019-01-01 RX ADMIN — Medication 10 ML: at 21:32

## 2019-01-01 RX ADMIN — METHYLPREDNISOLONE SODIUM SUCCINATE 40 MG: 40 INJECTION, POWDER, FOR SOLUTION INTRAMUSCULAR; INTRAVENOUS at 23:20

## 2019-01-01 RX ADMIN — METHYLPREDNISOLONE SODIUM SUCCINATE 40 MG: 40 INJECTION, POWDER, FOR SOLUTION INTRAMUSCULAR; INTRAVENOUS at 05:03

## 2019-01-01 RX ADMIN — FEBUXOSTAT 40 MG: 40 TABLET ORAL at 10:44

## 2019-01-01 RX ADMIN — FERROUS SULFATE TAB EC 324 MG (65 MG FE EQUIVALENT) 324 MG: 324 (65 FE) TABLET DELAYED RESPONSE at 08:28

## 2019-01-01 RX ADMIN — DOXYCYCLINE 100 MG: 100 TABLET, FILM COATED ORAL at 20:49

## 2019-01-01 RX ADMIN — MORPHINE SULFATE 2 MG: 4 INJECTION INTRAVENOUS at 11:26

## 2019-01-01 RX ADMIN — METHADONE HYDROCHLORIDE 10 MG: 10 TABLET ORAL at 09:43

## 2019-01-01 RX ADMIN — FUROSEMIDE 40 MG: 10 INJECTION, SOLUTION INTRAMUSCULAR; INTRAVENOUS at 17:49

## 2019-01-01 RX ADMIN — Medication 1 APPLICATION: at 10:16

## 2019-01-01 RX ADMIN — FERROUS SULFATE TAB EC 324 MG (65 MG FE EQUIVALENT) 324 MG: 324 (65 FE) TABLET DELAYED RESPONSE at 10:14

## 2019-01-01 RX ADMIN — POLYETHYLENE GLYCOL 3350 17 G: 17 POWDER, FOR SOLUTION ORAL at 10:44

## 2019-01-01 RX ADMIN — HYDRALAZINE HYDROCHLORIDE 25 MG: 25 TABLET, FILM COATED ORAL at 08:52

## 2019-01-01 RX ADMIN — BISACODYL 5 MG: 5 TABLET, COATED ORAL at 10:47

## 2019-01-01 RX ADMIN — HYDRALAZINE HYDROCHLORIDE 25 MG: 25 TABLET ORAL at 09:24

## 2019-01-01 RX ADMIN — HYDRALAZINE HYDROCHLORIDE 25 MG: 25 TABLET, FILM COATED ORAL at 01:45

## 2019-01-01 RX ADMIN — METOPROLOL TARTRATE 25 MG: 25 TABLET, FILM COATED ORAL at 10:46

## 2019-01-01 RX ADMIN — FUROSEMIDE 40 MG: 10 INJECTION, SOLUTION INTRAMUSCULAR; INTRAVENOUS at 09:25

## 2019-01-01 RX ADMIN — BUSPIRONE HYDROCHLORIDE 7.5 MG: 15 TABLET ORAL at 08:54

## 2019-01-01 RX ADMIN — BUSPIRONE HYDROCHLORIDE 7.5 MG: 15 TABLET ORAL at 23:18

## 2019-01-01 RX ADMIN — POLYETHYLENE GLYCOL 3350 17 G: 17 POWDER, FOR SOLUTION ORAL at 08:52

## 2019-01-01 RX ADMIN — POLYETHYLENE GLYCOL 3350 17 G: 17 POWDER, FOR SOLUTION ORAL at 09:42

## 2019-01-01 RX ADMIN — DOXYCYCLINE 100 MG: 100 TABLET, FILM COATED ORAL at 21:35

## 2019-01-01 RX ADMIN — BUSPIRONE HYDROCHLORIDE 7.5 MG: 15 TABLET ORAL at 22:16

## 2019-01-01 RX ADMIN — FERROUS SULFATE TAB EC 324 MG (65 MG FE EQUIVALENT) 324 MG: 324 (65 FE) TABLET DELAYED RESPONSE at 09:43

## 2019-01-01 RX ADMIN — ISOSORBIDE MONONITRATE 120 MG: 60 TABLET, EXTENDED RELEASE ORAL at 08:53

## 2019-01-01 RX ADMIN — METOPROLOL TARTRATE 25 MG: 25 TABLET ORAL at 22:23

## 2019-01-01 RX ADMIN — Medication 1 APPLICATION: at 09:30

## 2019-01-01 RX ADMIN — METHYLPREDNISOLONE SODIUM SUCCINATE 40 MG: 40 INJECTION, POWDER, FOR SOLUTION INTRAMUSCULAR; INTRAVENOUS at 03:09

## 2019-01-01 RX ADMIN — MELATONIN 1000 UNITS: at 10:14

## 2019-01-01 RX ADMIN — DOXYCYCLINE 100 MG: 100 TABLET, FILM COATED ORAL at 08:52

## 2019-01-01 RX ADMIN — SERTRALINE HYDROCHLORIDE 25 MG: 50 TABLET ORAL at 09:01

## 2019-01-01 RX ADMIN — Medication 10 ML: at 21:59

## 2019-01-01 RX ADMIN — PANTOPRAZOLE SODIUM 40 MG: 40 TABLET, DELAYED RELEASE ORAL at 10:33

## 2019-01-01 RX ADMIN — KETOTIFEN FUMARATE 1 DROP: 0.35 SOLUTION/ DROPS OPHTHALMIC at 12:41

## 2019-01-01 RX ADMIN — METHYLPREDNISOLONE SODIUM SUCCINATE 40 MG: 40 INJECTION, POWDER, FOR SOLUTION INTRAMUSCULAR; INTRAVENOUS at 13:15

## 2019-01-01 RX ADMIN — INSULIN LISPRO 3 UNITS: 100 INJECTION, SOLUTION INTRAVENOUS; SUBCUTANEOUS at 17:06

## 2019-01-01 RX ADMIN — DOCUSATE SODIUM 100 MG: 100 CAPSULE, LIQUID FILLED ORAL at 09:43

## 2019-01-01 RX ADMIN — MELATONIN TAB 5 MG 5 MG: 5 TAB at 20:08

## 2019-01-01 RX ADMIN — DIPHENHYDRAMINE HYDROCHLORIDE 12.5 MG: 50 INJECTION, SOLUTION INTRAMUSCULAR; INTRAVENOUS at 08:48

## 2019-01-01 RX ADMIN — ACETAMINOPHEN 650 MG: 650 SUPPOSITORY RECTAL at 02:57

## 2019-01-01 RX ADMIN — OXYBUTYNIN CHLORIDE 5 MG: 5 TABLET, EXTENDED RELEASE ORAL at 10:16

## 2019-01-01 RX ADMIN — ISOSORBIDE MONONITRATE 120 MG: 60 TABLET, EXTENDED RELEASE ORAL at 09:24

## 2019-01-01 RX ADMIN — DOXYCYCLINE 100 MG: 100 TABLET, FILM COATED ORAL at 01:45

## 2019-01-01 RX ADMIN — METHADONE HYDROCHLORIDE 10 MG: 10 TABLET ORAL at 16:09

## 2019-01-01 RX ADMIN — GABAPENTIN 300 MG: 300 CAPSULE ORAL at 01:45

## 2019-01-01 RX ADMIN — IPRATROPIUM BROMIDE AND ALBUTEROL SULFATE 3 ML: .5; 3 SOLUTION RESPIRATORY (INHALATION) at 15:12

## 2019-01-01 RX ADMIN — POLYETHYLENE GLYCOL 3350 17 G: 17 POWDER, FOR SOLUTION ORAL at 10:26

## 2019-01-01 RX ADMIN — INSULIN GLARGINE 30 UNITS: 100 INJECTION, SOLUTION SUBCUTANEOUS at 21:33

## 2019-01-01 RX ADMIN — Medication 10 ML: at 10:26

## 2019-01-01 RX ADMIN — HEPARIN SODIUM 5000 UNITS: 5000 INJECTION INTRAVENOUS; SUBCUTANEOUS at 22:13

## 2019-01-01 RX ADMIN — BISACODYL 5 MG: 5 TABLET, COATED ORAL at 10:33

## 2019-01-01 RX ADMIN — METHADONE HYDROCHLORIDE 10 MG: 10 TABLET ORAL at 10:45

## 2019-01-01 RX ADMIN — ZINC OXIDE 1 APPLICATION: 200 OINTMENT TOPICAL at 21:49

## 2019-01-01 RX ADMIN — CEFTRIAXONE SODIUM 1 G: 1 INJECTION, POWDER, FOR SOLUTION INTRAMUSCULAR; INTRAVENOUS at 13:12

## 2019-01-01 RX ADMIN — DOCUSATE SODIUM 100 MG: 100 CAPSULE, LIQUID FILLED ORAL at 10:14

## 2019-01-01 RX ADMIN — METHADONE HYDROCHLORIDE 10 MG: 10 TABLET ORAL at 17:32

## 2019-01-01 RX ADMIN — LEVOTHYROXINE SODIUM 112 MCG: 112 TABLET ORAL at 10:45

## 2019-01-01 RX ADMIN — METHADONE HYDROCHLORIDE 10 MG: 10 TABLET ORAL at 10:15

## 2019-01-01 RX ADMIN — DOCUSATE SODIUM 100 MG: 100 CAPSULE, LIQUID FILLED ORAL at 21:32

## 2019-01-01 RX ADMIN — ISOSORBIDE MONONITRATE 120 MG: 60 TABLET, EXTENDED RELEASE ORAL at 08:28

## 2019-01-01 RX ADMIN — OXYCODONE HYDROCHLORIDE AND ACETAMINOPHEN 1000 MG: 500 TABLET ORAL at 09:25

## 2019-01-01 RX ADMIN — IPRATROPIUM BROMIDE AND ALBUTEROL SULFATE 3 ML: .5; 3 SOLUTION RESPIRATORY (INHALATION) at 19:50

## 2019-01-01 RX ADMIN — GABAPENTIN 300 MG: 300 CAPSULE ORAL at 23:18

## 2019-01-01 RX ADMIN — KETOTIFEN FUMARATE 1 DROP: 0.35 SOLUTION/ DROPS OPHTHALMIC at 21:34

## 2019-01-01 RX ADMIN — IPRATROPIUM BROMIDE AND ALBUTEROL SULFATE 3 ML: .5; 3 SOLUTION RESPIRATORY (INHALATION) at 07:59

## 2019-01-01 RX ADMIN — PANTOPRAZOLE SODIUM 40 MG: 40 TABLET, DELAYED RELEASE ORAL at 08:54

## 2019-01-01 RX ADMIN — ASPIRIN 81 MG 81 MG: 81 TABLET ORAL at 08:55

## 2019-01-01 RX ADMIN — DOXYCYCLINE 100 MG: 100 TABLET, FILM COATED ORAL at 09:24

## 2019-01-01 RX ADMIN — IPRATROPIUM BROMIDE AND ALBUTEROL SULFATE 3 ML: .5; 3 SOLUTION RESPIRATORY (INHALATION) at 10:43

## 2019-01-01 RX ADMIN — DOXYCYCLINE 100 MG: 100 TABLET, FILM COATED ORAL at 10:45

## 2019-01-01 RX ADMIN — ZINC OXIDE 1 APPLICATION: 200 OINTMENT TOPICAL at 13:01

## 2019-01-01 RX ADMIN — ASPIRIN 81 MG 81 MG: 81 TABLET ORAL at 10:32

## 2019-01-01 RX ADMIN — INSULIN GLARGINE 30 UNITS: 100 INJECTION, SOLUTION SUBCUTANEOUS at 08:44

## 2019-01-01 RX ADMIN — IPRATROPIUM BROMIDE AND ALBUTEROL SULFATE 3 ML: .5; 3 SOLUTION RESPIRATORY (INHALATION) at 14:57

## 2019-01-01 RX ADMIN — Medication 10 ML: at 20:07

## 2019-01-01 RX ADMIN — BUSPIRONE HYDROCHLORIDE 7.5 MG: 15 TABLET ORAL at 16:32

## 2019-01-01 RX ADMIN — FUROSEMIDE 40 MG: 10 INJECTION, SOLUTION INTRAMUSCULAR; INTRAVENOUS at 02:50

## 2019-01-01 RX ADMIN — FUROSEMIDE 40 MG: 10 INJECTION, SOLUTION INTRAMUSCULAR; INTRAVENOUS at 21:33

## 2019-01-01 RX ADMIN — BISACODYL 5 MG: 5 TABLET, COATED ORAL at 08:53

## 2019-01-01 RX ADMIN — HYDRALAZINE HYDROCHLORIDE 25 MG: 25 TABLET ORAL at 20:49

## 2019-01-01 RX ADMIN — Medication 1 DOSE: at 15:45

## 2019-01-01 RX ADMIN — Medication 10 ML: at 08:57

## 2019-01-01 RX ADMIN — FUROSEMIDE 20 MG: 10 INJECTION, SOLUTION INTRAMUSCULAR; INTRAVENOUS at 13:03

## 2019-01-01 RX ADMIN — PANTOPRAZOLE SODIUM 40 MG: 40 TABLET, DELAYED RELEASE ORAL at 09:25

## 2019-01-01 RX ADMIN — INSULIN GLARGINE 30 UNITS: 100 INJECTION, SOLUTION SUBCUTANEOUS at 20:08

## 2019-01-01 RX ADMIN — BUSPIRONE HYDROCHLORIDE 7.5 MG: 15 TABLET ORAL at 08:26

## 2019-01-01 RX ADMIN — METHADONE HYDROCHLORIDE 10 MG: 10 TABLET ORAL at 17:49

## 2019-01-01 RX ADMIN — MORPHINE SULFATE 2 MG: 4 INJECTION INTRAVENOUS at 09:27

## 2019-01-01 RX ADMIN — METHADONE HYDROCHLORIDE 10 MG: 10 TABLET ORAL at 20:49

## 2019-01-01 RX ADMIN — OXYCODONE HYDROCHLORIDE AND ACETAMINOPHEN 1000 MG: 500 TABLET ORAL at 08:28

## 2019-01-01 RX ADMIN — DOCUSATE SODIUM 100 MG: 100 CAPSULE, LIQUID FILLED ORAL at 08:25

## 2019-01-01 RX ADMIN — OXYBUTYNIN CHLORIDE 5 MG: 5 TABLET, EXTENDED RELEASE ORAL at 08:53

## 2019-01-01 RX ADMIN — ATORVASTATIN CALCIUM 10 MG: 10 TABLET, FILM COATED ORAL at 10:25

## 2019-01-01 RX ADMIN — FUROSEMIDE 40 MG: 10 INJECTION, SOLUTION INTRAMUSCULAR; INTRAVENOUS at 02:49

## 2019-01-01 RX ADMIN — HYDRALAZINE HYDROCHLORIDE 25 MG: 25 TABLET ORAL at 17:49

## 2019-01-01 RX ADMIN — KETOTIFEN FUMARATE 1 DROP: 0.25 SOLUTION OPHTHALMIC at 22:15

## 2019-01-01 RX ADMIN — MELATONIN 1000 UNITS: at 09:30

## 2019-01-01 RX ADMIN — DOXYCYCLINE 100 MG: 100 TABLET, FILM COATED ORAL at 08:28

## 2019-01-01 RX ADMIN — CEFEPIME HYDROCHLORIDE 2 G: 2 INJECTION, POWDER, FOR SOLUTION INTRAVENOUS at 05:53

## 2019-01-01 RX ADMIN — POLYETHYLENE GLYCOL 3350 17 G: 17 POWDER, FOR SOLUTION ORAL at 08:29

## 2019-01-01 RX ADMIN — OXYCODONE HYDROCHLORIDE AND ACETAMINOPHEN 1000 MG: 500 TABLET ORAL at 08:55

## 2019-01-01 RX ADMIN — Medication 10 ML: at 10:50

## 2019-01-01 RX ADMIN — CEFEPIME HYDROCHLORIDE 2 G: 2 INJECTION, POWDER, FOR SOLUTION INTRAVENOUS at 17:12

## 2019-01-01 RX ADMIN — PANTOPRAZOLE SODIUM 40 MG: 40 TABLET, DELAYED RELEASE ORAL at 08:26

## 2019-01-01 RX ADMIN — INSULIN LISPRO 2 UNITS: 100 INJECTION, SOLUTION INTRAVENOUS; SUBCUTANEOUS at 13:13

## 2019-01-01 RX ADMIN — MELATONIN TAB 5 MG 5 MG: 5 TAB at 22:13

## 2019-01-01 RX ADMIN — METHYLPREDNISOLONE SODIUM SUCCINATE 40 MG: 40 INJECTION, POWDER, FOR SOLUTION INTRAMUSCULAR; INTRAVENOUS at 11:56

## 2019-01-01 RX ADMIN — ACETAMINOPHEN 650 MG: 325 TABLET ORAL at 08:27

## 2019-01-01 RX ADMIN — FEBUXOSTAT 40 MG: 40 TABLET ORAL at 10:14

## 2019-01-01 RX ADMIN — FEBUXOSTAT 40 MG: 40 TABLET ORAL at 09:25

## 2019-01-01 RX ADMIN — METHADONE HYDROCHLORIDE 10 MG: 10 TABLET ORAL at 17:14

## 2019-01-01 RX ADMIN — METHADONE HYDROCHLORIDE 10 MG: 10 TABLET ORAL at 08:55

## 2019-01-01 RX ADMIN — IPRATROPIUM BROMIDE AND ALBUTEROL SULFATE 3 ML: .5; 3 SOLUTION RESPIRATORY (INHALATION) at 19:30

## 2019-01-01 RX ADMIN — SERTRALINE HYDROCHLORIDE 25 MG: 50 TABLET ORAL at 09:25

## 2019-01-01 RX ADMIN — LORAZEPAM 1 MG: 2 INJECTION INTRAMUSCULAR at 13:53

## 2019-01-01 RX ADMIN — DOCUSATE SODIUM 100 MG: 100 CAPSULE, LIQUID FILLED ORAL at 21:35

## 2019-01-01 RX ADMIN — TECHNETIUM TC99M PYROPHOSPHATE 1 DOSE: 12 INJECTION INTRAVENOUS at 15:30

## 2019-01-01 RX ADMIN — OXYCODONE HYDROCHLORIDE AND ACETAMINOPHEN 1000 MG: 500 TABLET ORAL at 10:15

## 2019-01-01 RX ADMIN — METHADONE HYDROCHLORIDE 10 MG: 10 TABLET ORAL at 21:32

## 2019-01-01 RX ADMIN — INSULIN LISPRO 3 UNITS: 100 INJECTION, SOLUTION INTRAVENOUS; SUBCUTANEOUS at 08:39

## 2019-01-01 RX ADMIN — KETOTIFEN FUMARATE 1 DROP: 0.35 SOLUTION/ DROPS OPHTHALMIC at 21:48

## 2019-01-01 RX ADMIN — KETOTIFEN FUMARATE 1 DROP: 0.35 SOLUTION/ DROPS OPHTHALMIC at 21:32

## 2019-01-01 RX ADMIN — Medication 1 APPLICATION: at 10:43

## 2019-01-01 RX ADMIN — FERROUS SULFATE TAB EC 324 MG (65 MG FE EQUIVALENT) 324 MG: 324 (65 FE) TABLET DELAYED RESPONSE at 09:24

## 2019-01-01 RX ADMIN — ZINC OXIDE 1 APPLICATION: 200 OINTMENT TOPICAL at 23:16

## 2019-01-01 RX ADMIN — ZINC OXIDE 1 APPLICATION: 200 OINTMENT TOPICAL at 01:45

## 2019-01-01 RX ADMIN — INSULIN GLARGINE 30 UNITS: 100 INJECTION, SOLUTION SUBCUTANEOUS at 21:00

## 2019-01-01 RX ADMIN — SERTRALINE HYDROCHLORIDE 25 MG: 50 TABLET ORAL at 08:25

## 2019-01-01 RX ADMIN — BISACODYL 5 MG: 5 TABLET, COATED ORAL at 09:43

## 2019-01-01 RX ADMIN — HYDRALAZINE HYDROCHLORIDE 25 MG: 25 TABLET, FILM COATED ORAL at 16:32

## 2019-01-01 RX ADMIN — Medication 10 ML: at 23:20

## 2019-01-01 RX ADMIN — ASPIRIN 81 MG 81 MG: 81 TABLET ORAL at 09:42

## 2019-01-01 RX ADMIN — DOXYCYCLINE 100 MG: 100 TABLET, FILM COATED ORAL at 23:19

## 2019-01-01 RX ADMIN — MICONAZOLE NITRATE: 20.6 POWDER TOPICAL at 17:13

## 2019-01-01 RX ADMIN — METOPROLOL TARTRATE 25 MG: 25 TABLET, FILM COATED ORAL at 10:13

## 2019-01-01 RX ADMIN — HYDRALAZINE HYDROCHLORIDE 25 MG: 25 TABLET ORAL at 21:32

## 2019-01-01 RX ADMIN — Medication 10 ML: at 11:40

## 2019-01-01 RX ADMIN — METOPROLOL TARTRATE 25 MG: 25 TABLET ORAL at 23:18

## 2019-01-01 RX ADMIN — FERROUS SULFATE TAB EC 324 MG (65 MG FE EQUIVALENT) 324 MG: 324 (65 FE) TABLET DELAYED RESPONSE at 08:53

## 2019-01-01 RX ADMIN — CALCIUM GLUCONATE 2 G: 20 INJECTION, SOLUTION INTRAVENOUS at 03:58

## 2019-01-01 RX ADMIN — INSULIN GLARGINE 30 UNITS: 100 INJECTION, SOLUTION SUBCUTANEOUS at 08:26

## 2019-01-01 RX ADMIN — METHADONE HYDROCHLORIDE 10 MG: 10 TABLET ORAL at 21:40

## 2019-01-01 RX ADMIN — INSULIN GLARGINE 30 UNITS: 100 INJECTION, SOLUTION SUBCUTANEOUS at 09:04

## 2019-01-01 RX ADMIN — Medication 10 ML: at 22:13

## 2019-01-01 RX ADMIN — MORPHINE SULFATE 2 MG: 4 INJECTION INTRAVENOUS at 18:09

## 2019-01-01 RX ADMIN — Medication 10 ML: at 10:44

## 2019-01-01 RX ADMIN — FUROSEMIDE 40 MG: 10 INJECTION, SOLUTION INTRAMUSCULAR; INTRAVENOUS at 14:05

## 2019-01-01 RX ADMIN — PANTOPRAZOLE SODIUM 40 MG: 40 TABLET, DELAYED RELEASE ORAL at 10:13

## 2019-01-01 RX ADMIN — MICONAZOLE NITRATE: 20.6 POWDER TOPICAL at 21:49

## 2019-01-01 RX ADMIN — HYDRALAZINE HYDROCHLORIDE 25 MG: 25 TABLET ORAL at 09:43

## 2019-01-01 RX ADMIN — Medication 10 ML: at 09:06

## 2019-01-01 RX ADMIN — KETOTIFEN FUMARATE 1 DROP: 0.25 SOLUTION OPHTHALMIC at 08:34

## 2019-01-01 RX ADMIN — FUROSEMIDE 40 MG: 10 INJECTION, SOLUTION INTRAMUSCULAR; INTRAVENOUS at 01:01

## 2019-01-01 RX ADMIN — MICONAZOLE NITRATE: 20.6 POWDER TOPICAL at 09:49

## 2019-01-01 RX ADMIN — CEFEPIME HYDROCHLORIDE 2 G: 2 INJECTION, POWDER, FOR SOLUTION INTRAVENOUS at 04:57

## 2019-01-01 RX ADMIN — HYDRALAZINE HYDROCHLORIDE 25 MG: 25 TABLET, FILM COATED ORAL at 22:12

## 2019-01-01 RX ADMIN — ZINC OXIDE 1 APPLICATION: 200 OINTMENT TOPICAL at 21:41

## 2019-01-01 RX ADMIN — Medication 10 ML: at 09:48

## 2019-01-01 RX ADMIN — ATORVASTATIN CALCIUM 10 MG: 10 TABLET, FILM COATED ORAL at 09:24

## 2019-01-01 RX ADMIN — LEVOTHYROXINE SODIUM 112 MCG: 112 TABLET ORAL at 09:32

## 2019-01-01 RX ADMIN — METOPROLOL TARTRATE 25 MG: 25 TABLET ORAL at 08:24

## 2019-01-01 RX ADMIN — DOXYCYCLINE 100 MG: 100 TABLET, FILM COATED ORAL at 21:49

## 2019-01-01 RX ADMIN — LEVOTHYROXINE SODIUM 137 MCG: 25 TABLET ORAL at 08:55

## 2019-01-01 RX ADMIN — Medication 10 ML: at 08:48

## 2019-01-01 RX ADMIN — FEBUXOSTAT 40 MG: 40 TABLET ORAL at 09:42

## 2019-01-01 RX ADMIN — METHADONE HYDROCHLORIDE 10 MG: 10 TABLET ORAL at 19:46

## 2019-01-01 RX ADMIN — FUROSEMIDE 40 MG: 10 INJECTION, SOLUTION INTRAVENOUS at 01:45

## 2019-01-01 RX ADMIN — POLYETHYLENE GLYCOL 3350 17 G: 17 POWDER, FOR SOLUTION ORAL at 09:31

## 2019-01-01 RX ADMIN — ISOSORBIDE MONONITRATE 120 MG: 60 TABLET, EXTENDED RELEASE ORAL at 09:42

## 2019-01-01 RX ADMIN — Medication 10 ML: at 21:49

## 2019-01-01 RX ADMIN — INSULIN LISPRO 2 UNITS: 100 INJECTION, SOLUTION INTRAVENOUS; SUBCUTANEOUS at 17:32

## 2019-01-01 RX ADMIN — ZINC OXIDE 1 APPLICATION: 200 OINTMENT TOPICAL at 21:32

## 2019-01-01 RX ADMIN — ATORVASTATIN CALCIUM 10 MG: 10 TABLET, FILM COATED ORAL at 10:31

## 2019-01-01 RX ADMIN — DOCUSATE SODIUM 100 MG: 100 CAPSULE, LIQUID FILLED ORAL at 09:24

## 2019-01-01 RX ADMIN — KETOTIFEN FUMARATE 1 DROP: 0.35 SOLUTION/ DROPS OPHTHALMIC at 09:51

## 2019-01-01 RX ADMIN — GABAPENTIN 300 MG: 300 CAPSULE ORAL at 21:35

## 2019-01-01 RX ADMIN — FUROSEMIDE 40 MG: 10 INJECTION, SOLUTION INTRAMUSCULAR; INTRAVENOUS at 17:55

## 2019-01-01 RX ADMIN — METHADONE HYDROCHLORIDE 10 MG: 10 TABLET ORAL at 09:24

## 2019-01-01 RX ADMIN — Medication 1 APPLICATION: at 22:15

## 2019-01-01 RX ADMIN — FUROSEMIDE 40 MG: 10 INJECTION, SOLUTION INTRAMUSCULAR; INTRAVENOUS at 17:31

## 2019-01-01 RX ADMIN — ACETAMINOPHEN 650 MG: 650 SUPPOSITORY RECTAL at 04:15

## 2019-01-01 RX ADMIN — FUROSEMIDE 40 MG: 10 INJECTION, SOLUTION INTRAMUSCULAR; INTRAVENOUS at 01:27

## 2019-01-01 RX ADMIN — INSULIN LISPRO 2 UNITS: 100 INJECTION, SOLUTION INTRAVENOUS; SUBCUTANEOUS at 10:12

## 2019-01-01 RX ADMIN — FUROSEMIDE 40 MG: 10 INJECTION, SOLUTION INTRAMUSCULAR; INTRAVENOUS at 09:50

## 2019-01-01 RX ADMIN — HYDRALAZINE HYDROCHLORIDE 25 MG: 25 TABLET ORAL at 10:32

## 2019-01-01 RX ADMIN — IPRATROPIUM BROMIDE AND ALBUTEROL SULFATE 3 ML: .5; 3 SOLUTION RESPIRATORY (INHALATION) at 07:06

## 2019-01-01 RX ADMIN — FUROSEMIDE 80 MG: 40 TABLET ORAL at 08:25

## 2019-01-01 RX ADMIN — METOPROLOL TARTRATE 25 MG: 25 TABLET, FILM COATED ORAL at 21:32

## 2019-01-01 RX ADMIN — INSULIN LISPRO 3 UNITS: 100 INJECTION, SOLUTION INTRAVENOUS; SUBCUTANEOUS at 18:11

## 2019-01-01 RX ADMIN — DOCUSATE SODIUM 100 MG: 100 CAPSULE, LIQUID FILLED ORAL at 20:49

## 2019-01-01 RX ADMIN — PANTOPRAZOLE SODIUM 40 MG: 40 TABLET, DELAYED RELEASE ORAL at 08:28

## 2019-01-01 RX ADMIN — DOCUSATE SODIUM 100 MG: 100 CAPSULE, LIQUID FILLED ORAL at 22:13

## 2019-01-01 RX ADMIN — OXYCODONE HYDROCHLORIDE AND ACETAMINOPHEN 1000 MG: 500 TABLET ORAL at 09:48

## 2019-01-01 RX ADMIN — MICONAZOLE NITRATE: 20.6 POWDER TOPICAL at 22:03

## 2019-01-01 RX ADMIN — FUROSEMIDE 40 MG: 10 INJECTION, SOLUTION INTRAMUSCULAR; INTRAVENOUS at 10:12

## 2019-01-01 RX ADMIN — LEVOTHYROXINE SODIUM 112 MCG: 112 TABLET ORAL at 10:31

## 2019-01-01 RX ADMIN — FEBUXOSTAT 40 MG: 40 TABLET ORAL at 10:33

## 2019-01-01 RX ADMIN — DOXYCYCLINE 100 MG: 100 TABLET, FILM COATED ORAL at 10:14

## 2019-01-01 RX ADMIN — FERROUS SULFATE TAB EC 324 MG (65 MG FE EQUIVALENT) 324 MG: 324 (65 FE) TABLET DELAYED RESPONSE at 10:30

## 2019-01-01 RX ADMIN — IPRATROPIUM BROMIDE AND ALBUTEROL SULFATE 3 ML: .5; 3 SOLUTION RESPIRATORY (INHALATION) at 11:05

## 2019-01-01 RX ADMIN — ZINC OXIDE 1 APPLICATION: 200 OINTMENT TOPICAL at 22:14

## 2019-01-01 RX ADMIN — ZINC OXIDE 1 APPLICATION: 200 OINTMENT TOPICAL at 09:31

## 2019-01-01 RX ADMIN — ISOSORBIDE MONONITRATE 120 MG: 60 TABLET, EXTENDED RELEASE ORAL at 10:31

## 2019-01-01 RX ADMIN — CHLOROTHIAZIDE SODIUM 250 MG: 500 INJECTION, POWDER, LYOPHILIZED, FOR SOLUTION INTRAVENOUS at 10:56

## 2019-01-01 RX ADMIN — HEPARIN SODIUM 2300 UNITS: 1000 INJECTION, SOLUTION INTRAVENOUS; SUBCUTANEOUS at 15:10

## 2019-01-01 RX ADMIN — INSULIN LISPRO 3 UNITS: 100 INJECTION, SOLUTION INTRAVENOUS; SUBCUTANEOUS at 11:55

## 2019-01-01 RX ADMIN — IPRATROPIUM BROMIDE AND ALBUTEROL SULFATE 3 ML: .5; 3 SOLUTION RESPIRATORY (INHALATION) at 06:37

## 2019-01-01 RX ADMIN — Medication 10 ML: at 01:45

## 2019-01-01 RX ADMIN — METHADONE HYDROCHLORIDE 10 MG: 10 TABLET ORAL at 21:48

## 2019-01-01 RX ADMIN — Medication 10 ML: at 20:50

## 2019-01-01 RX ADMIN — DOXYCYCLINE 100 MG: 100 TABLET, FILM COATED ORAL at 10:31

## 2019-01-01 RX ADMIN — LEVOTHYROXINE SODIUM 112 MCG: 112 TABLET ORAL at 10:13

## 2019-01-01 RX ADMIN — MORPHINE SULFATE 10 MG: 100 SOLUTION ORAL at 12:30

## 2019-01-01 RX ADMIN — FUROSEMIDE 40 MG: 10 INJECTION, SOLUTION INTRAMUSCULAR; INTRAVENOUS at 22:14

## 2019-01-01 RX ADMIN — ZINC OXIDE 1 APPLICATION: 200 OINTMENT TOPICAL at 20:07

## 2019-01-01 RX ADMIN — BUMETANIDE 2 MG: 1 TABLET ORAL at 12:41

## 2019-01-01 RX ADMIN — ASPIRIN 81 MG 81 MG: 81 TABLET ORAL at 10:13

## 2019-01-01 RX ADMIN — ASPIRIN 81 MG 81 MG: 81 TABLET ORAL at 08:27

## 2019-01-01 RX ADMIN — ZINC OXIDE 1 APPLICATION: 200 OINTMENT TOPICAL at 22:03

## 2019-01-01 RX ADMIN — ATORVASTATIN CALCIUM 10 MG: 10 TABLET, FILM COATED ORAL at 01:45

## 2019-01-01 RX ADMIN — ZINC OXIDE 1 APPLICATION: 200 OINTMENT TOPICAL at 10:44

## 2019-01-01 RX ADMIN — SERTRALINE HYDROCHLORIDE 25 MG: 50 TABLET ORAL at 10:46

## 2019-01-01 RX ADMIN — GABAPENTIN 300 MG: 300 CAPSULE ORAL at 08:27

## 2019-01-01 RX ADMIN — GABAPENTIN 300 MG: 300 CAPSULE ORAL at 22:12

## 2019-01-01 RX ADMIN — MICONAZOLE NITRATE: 20.6 POWDER TOPICAL at 10:16

## 2019-01-01 RX ADMIN — DOCUSATE SODIUM 100 MG: 100 CAPSULE, LIQUID FILLED ORAL at 08:55

## 2019-01-01 RX ADMIN — INSULIN LISPRO 2 UNITS: 100 INJECTION, SOLUTION INTRAVENOUS; SUBCUTANEOUS at 09:40

## 2019-01-01 RX ADMIN — Medication 1 APPLICATION: at 12:42

## 2019-01-01 RX ADMIN — HYDRALAZINE HYDROCHLORIDE 25 MG: 25 TABLET ORAL at 10:46

## 2019-01-01 RX ADMIN — HYDRALAZINE HYDROCHLORIDE 25 MG: 25 TABLET ORAL at 17:13

## 2019-01-01 RX ADMIN — FUROSEMIDE 40 MG: 10 INJECTION, SOLUTION INTRAMUSCULAR; INTRAVENOUS at 05:03

## 2019-01-01 RX ADMIN — METHADONE HYDROCHLORIDE 10 MG: 10 TABLET ORAL at 22:24

## 2019-01-01 RX ADMIN — FUROSEMIDE 40 MG: 10 INJECTION, SOLUTION INTRAMUSCULAR; INTRAVENOUS at 16:09

## 2019-01-01 RX ADMIN — FERROUS SULFATE TAB EC 324 MG (65 MG FE EQUIVALENT) 324 MG: 324 (65 FE) TABLET DELAYED RESPONSE at 10:44

## 2019-01-01 RX ADMIN — OXYBUTYNIN CHLORIDE 5 MG: 5 TABLET, EXTENDED RELEASE ORAL at 09:25

## 2019-01-01 RX ADMIN — HEPARIN SODIUM 5000 UNITS: 5000 INJECTION INTRAVENOUS; SUBCUTANEOUS at 23:19

## 2019-01-01 RX ADMIN — METOPROLOL TARTRATE 25 MG: 25 TABLET ORAL at 08:55

## 2019-01-01 RX ADMIN — HYDRALAZINE HYDROCHLORIDE 25 MG: 25 TABLET, FILM COATED ORAL at 08:31

## 2019-01-01 RX ADMIN — LEVOTHYROXINE SODIUM 112 MCG: 112 TABLET ORAL at 09:43

## 2019-01-01 RX ADMIN — MELATONIN 1000 UNITS: at 10:31

## 2019-01-01 RX ADMIN — OXYCODONE HYDROCHLORIDE AND ACETAMINOPHEN 1000 MG: 500 TABLET ORAL at 10:33

## 2019-01-01 RX ADMIN — MICONAZOLE NITRATE: 20.6 POWDER TOPICAL at 21:32

## 2019-01-01 RX ADMIN — MICONAZOLE NITRATE: 20.6 POWDER TOPICAL at 10:43

## 2019-01-01 RX ADMIN — METRONIDAZOLE 500 MG: 500 INJECTION, SOLUTION INTRAVENOUS at 05:04

## 2019-01-01 RX ADMIN — METOPROLOL TARTRATE 25 MG: 25 TABLET, FILM COATED ORAL at 21:41

## 2019-01-01 RX ADMIN — OXYBUTYNIN CHLORIDE 5 MG: 5 TABLET, EXTENDED RELEASE ORAL at 10:47

## 2019-01-01 RX ADMIN — FEBUXOSTAT 40 MG: 40 TABLET ORAL at 08:27

## 2019-01-01 RX ADMIN — KETOTIFEN FUMARATE 1 DROP: 0.25 SOLUTION OPHTHALMIC at 01:45

## 2019-01-01 RX ADMIN — MICONAZOLE NITRATE: 20.6 POWDER TOPICAL at 21:41

## 2019-01-01 RX ADMIN — ASPIRIN 81 MG 81 MG: 81 TABLET ORAL at 09:23

## 2019-01-01 RX ADMIN — CEFTRIAXONE SODIUM 1 G: 1 INJECTION, POWDER, FOR SOLUTION INTRAMUSCULAR; INTRAVENOUS at 12:08

## 2019-01-01 RX ADMIN — HYDRALAZINE HYDROCHLORIDE 25 MG: 25 TABLET ORAL at 10:15

## 2019-01-01 RX ADMIN — METHADONE HYDROCHLORIDE 10 MG: 10 TABLET ORAL at 10:33

## 2019-01-01 RX ADMIN — HYDRALAZINE HYDROCHLORIDE 25 MG: 25 TABLET ORAL at 21:48

## 2019-01-01 RX ADMIN — ATORVASTATIN CALCIUM 10 MG: 10 TABLET, FILM COATED ORAL at 09:43

## 2019-01-01 RX ADMIN — HEPARIN SODIUM 5000 UNITS: 5000 INJECTION INTRAVENOUS; SUBCUTANEOUS at 01:45

## 2019-01-01 RX ADMIN — HYDRALAZINE HYDROCHLORIDE 25 MG: 25 TABLET ORAL at 21:36

## 2019-01-01 RX ADMIN — ZINC OXIDE 1 APPLICATION: 200 OINTMENT TOPICAL at 10:16

## 2019-01-01 RX ADMIN — IPRATROPIUM BROMIDE AND ALBUTEROL SULFATE 3 ML: .5; 3 SOLUTION RESPIRATORY (INHALATION) at 20:13

## 2019-01-01 RX ADMIN — Medication 10 ML: at 09:40

## 2019-01-01 RX ADMIN — GABAPENTIN 300 MG: 300 CAPSULE ORAL at 21:32

## 2019-01-01 RX ADMIN — PANTOPRAZOLE SODIUM 40 MG: 40 TABLET, DELAYED RELEASE ORAL at 10:47

## 2019-01-01 RX ADMIN — IPRATROPIUM BROMIDE AND ALBUTEROL SULFATE 3 ML: .5; 3 SOLUTION RESPIRATORY (INHALATION) at 10:55

## 2019-01-01 RX ADMIN — ZINC OXIDE 1 APPLICATION: 200 OINTMENT TOPICAL at 12:41

## 2019-01-01 RX ADMIN — KETOTIFEN FUMARATE 1 DROP: 0.25 SOLUTION OPHTHALMIC at 23:16

## 2019-01-01 RX ADMIN — HYDRALAZINE HYDROCHLORIDE 25 MG: 25 TABLET, FILM COATED ORAL at 23:18

## 2019-01-01 RX ADMIN — METOPROLOL TARTRATE 25 MG: 25 TABLET, FILM COATED ORAL at 20:50

## 2019-01-01 RX ADMIN — LINAGLIPTIN 5 MG: 5 TABLET, FILM COATED ORAL at 10:15

## 2019-01-01 RX ADMIN — DOCUSATE SODIUM 100 MG: 100 CAPSULE, LIQUID FILLED ORAL at 10:30

## 2019-01-01 RX ADMIN — DOCUSATE SODIUM 100 MG: 100 CAPSULE, LIQUID FILLED ORAL at 08:27

## 2019-01-01 RX ADMIN — PANTOPRAZOLE SODIUM 40 MG: 40 TABLET, DELAYED RELEASE ORAL at 09:43

## 2019-01-01 RX ADMIN — ASPIRIN 325 MG ORAL TABLET 325 MG: 325 PILL ORAL at 03:26

## 2019-01-01 RX ADMIN — METOLAZONE 10 MG: 5 TABLET ORAL at 14:49

## 2019-01-01 RX ADMIN — DOCUSATE SODIUM 100 MG: 100 CAPSULE, LIQUID FILLED ORAL at 21:49

## 2019-01-01 RX ADMIN — SERTRALINE HYDROCHLORIDE 25 MG: 50 TABLET ORAL at 10:15

## 2019-01-01 RX ADMIN — METOPROLOL TARTRATE 25 MG: 25 TABLET, FILM COATED ORAL at 09:24

## 2019-01-01 RX ADMIN — HYDRALAZINE HYDROCHLORIDE 25 MG: 25 TABLET ORAL at 17:36

## 2019-01-01 RX ADMIN — KETOTIFEN FUMARATE 1 DROP: 0.35 SOLUTION/ DROPS OPHTHALMIC at 10:32

## 2019-01-01 RX ADMIN — ASPIRIN 81 MG 81 MG: 81 TABLET ORAL at 08:25

## 2019-01-01 RX ADMIN — INSULIN LISPRO 4 UNITS: 100 INJECTION, SOLUTION INTRAVENOUS; SUBCUTANEOUS at 14:12

## 2019-01-01 RX ADMIN — MORPHINE SULFATE 10 MG: 100 SOLUTION ORAL at 16:59

## 2019-01-01 RX ADMIN — MELATONIN 1000 UNITS: at 09:43

## 2019-01-01 RX ADMIN — CEFTRIAXONE SODIUM 1 G: 1 INJECTION, POWDER, FOR SOLUTION INTRAMUSCULAR; INTRAVENOUS at 12:00

## 2019-01-01 RX ADMIN — Medication 10 ML: at 09:26

## 2019-01-01 RX ADMIN — CEFDINIR 300 MG: 300 CAPSULE ORAL at 23:19

## 2019-01-01 RX ADMIN — METOLAZONE 5 MG: 5 TABLET ORAL at 12:09

## 2019-01-01 RX ADMIN — Medication 10 ML: at 08:32

## 2019-01-01 RX ADMIN — INSULIN GLARGINE 30 UNITS: 100 INJECTION, SOLUTION SUBCUTANEOUS at 21:51

## 2019-01-01 RX ADMIN — LINAGLIPTIN 5 MG: 5 TABLET, FILM COATED ORAL at 10:31

## 2019-01-01 RX ADMIN — FUROSEMIDE 40 MG: 10 INJECTION, SOLUTION INTRAMUSCULAR; INTRAVENOUS at 16:32

## 2019-01-01 RX ADMIN — GABAPENTIN 300 MG: 300 CAPSULE ORAL at 20:49

## 2019-01-01 RX ADMIN — INSULIN LISPRO 3 UNITS: 100 INJECTION, SOLUTION INTRAVENOUS; SUBCUTANEOUS at 05:58

## 2019-01-01 RX ADMIN — MORPHINE SULFATE 10 MG: 100 SOLUTION ORAL at 14:10

## 2019-01-01 RX ADMIN — ATORVASTATIN CALCIUM 10 MG: 10 TABLET, FILM COATED ORAL at 10:46

## 2019-01-01 RX ADMIN — MELATONIN 1000 UNITS: at 10:46

## 2019-01-01 RX ADMIN — FUROSEMIDE 20 MG: 10 INJECTION, SOLUTION INTRAMUSCULAR; INTRAVENOUS at 03:26

## 2019-01-01 RX ADMIN — OXYBUTYNIN CHLORIDE 5 MG: 5 TABLET, EXTENDED RELEASE ORAL at 09:43

## 2019-01-01 RX ADMIN — OXYBUTYNIN CHLORIDE 5 MG: 5 TABLET, EXTENDED RELEASE ORAL at 08:25

## 2019-01-01 RX ADMIN — Medication 1 APPLICATION: at 20:07

## 2019-01-01 RX ADMIN — OXYCODONE HYDROCHLORIDE AND ACETAMINOPHEN 1000 MG: 500 TABLET ORAL at 10:45

## 2019-01-01 RX ADMIN — CEFDINIR 300 MG: 300 CAPSULE ORAL at 08:28

## 2019-01-01 RX ADMIN — IPRATROPIUM BROMIDE AND ALBUTEROL SULFATE 3 ML: .5; 3 SOLUTION RESPIRATORY (INHALATION) at 11:51

## 2019-01-01 RX ADMIN — FUROSEMIDE 40 MG: 10 INJECTION, SOLUTION INTRAMUSCULAR; INTRAVENOUS at 10:30

## 2019-01-01 RX ADMIN — KETOTIFEN FUMARATE 1 DROP: 0.35 SOLUTION/ DROPS OPHTHALMIC at 10:13

## 2019-01-01 RX ADMIN — HEPARIN SODIUM 5000 UNITS: 5000 INJECTION INTRAVENOUS; SUBCUTANEOUS at 09:01

## 2019-01-01 RX ADMIN — KETOTIFEN FUMARATE 1 DROP: 0.25 SOLUTION OPHTHALMIC at 09:00

## 2019-01-01 RX ADMIN — Medication 10 ML: at 21:41

## 2019-01-01 RX ADMIN — FUROSEMIDE 40 MG: 10 INJECTION, SOLUTION INTRAMUSCULAR; INTRAVENOUS at 05:20

## 2019-01-01 RX ADMIN — LINAGLIPTIN 5 MG: 5 TABLET, FILM COATED ORAL at 09:25

## 2019-01-01 RX ADMIN — DOCUSATE SODIUM 100 MG: 100 CAPSULE, LIQUID FILLED ORAL at 10:45

## 2019-01-01 RX ADMIN — ATORVASTATIN CALCIUM 10 MG: 10 TABLET, FILM COATED ORAL at 22:13

## 2019-01-01 RX ADMIN — LINAGLIPTIN 5 MG: 5 TABLET, FILM COATED ORAL at 09:42

## 2019-01-01 RX ADMIN — HEPARIN SODIUM 5000 UNITS: 5000 INJECTION INTRAVENOUS; SUBCUTANEOUS at 08:39

## 2019-01-01 RX ADMIN — FUROSEMIDE 40 MG: 10 INJECTION, SOLUTION INTRAMUSCULAR; INTRAVENOUS at 10:45

## 2019-01-01 RX ADMIN — SODIUM CHLORIDE 1641 ML: 900 INJECTION, SOLUTION INTRAVENOUS at 04:47

## 2019-01-01 RX ADMIN — GABAPENTIN 300 MG: 300 CAPSULE ORAL at 21:48

## 2019-01-01 RX ADMIN — POLYETHYLENE GLYCOL 3350 17 G: 17 POWDER, FOR SOLUTION ORAL at 10:32

## 2019-01-01 RX ADMIN — INSULIN LISPRO 3 UNITS: 100 INJECTION, SOLUTION INTRAVENOUS; SUBCUTANEOUS at 13:00

## 2019-01-01 RX ADMIN — KETOTIFEN FUMARATE 1 DROP: 0.35 SOLUTION/ DROPS OPHTHALMIC at 21:06

## 2019-01-01 RX ADMIN — MICONAZOLE NITRATE: 20.6 POWDER TOPICAL at 09:31

## 2019-01-01 RX ADMIN — ZINC OXIDE 1 APPLICATION: 200 OINTMENT TOPICAL at 09:48

## 2019-01-01 RX ADMIN — LEVOTHYROXINE SODIUM 137 MCG: 25 TABLET ORAL at 08:24

## 2019-01-01 RX ADMIN — METOPROLOL TARTRATE 25 MG: 25 TABLET, FILM COATED ORAL at 10:33

## 2019-01-01 RX ADMIN — IPRATROPIUM BROMIDE AND ALBUTEROL SULFATE 3 ML: .5; 3 SOLUTION RESPIRATORY (INHALATION) at 06:34

## 2019-01-01 RX ADMIN — FUROSEMIDE 40 MG: 10 INJECTION, SOLUTION INTRAMUSCULAR; INTRAVENOUS at 03:59

## 2019-01-01 RX ADMIN — DEXTROSE 50 % IN WATER (D50W) INTRAVENOUS SYRINGE 25 G: at 20:21

## 2019-01-01 RX ADMIN — Medication 1 APPLICATION: at 09:49

## 2019-01-01 RX ADMIN — ZINC OXIDE 1 APPLICATION: 200 OINTMENT TOPICAL at 08:32

## 2019-01-01 RX ADMIN — CEFTRIAXONE SODIUM 1 G: 1 INJECTION, POWDER, FOR SOLUTION INTRAMUSCULAR; INTRAVENOUS at 14:38

## 2019-01-01 RX ADMIN — METOPROLOL TARTRATE 25 MG: 25 TABLET, FILM COATED ORAL at 21:49

## 2019-01-01 RX ADMIN — ISOSORBIDE MONONITRATE 120 MG: 60 TABLET, EXTENDED RELEASE ORAL at 10:45

## 2019-01-01 RX ADMIN — FEBUXOSTAT 40 MG: 40 TABLET ORAL at 08:52

## 2019-01-01 RX ADMIN — SERTRALINE HYDROCHLORIDE 25 MG: 50 TABLET ORAL at 09:43

## 2019-01-01 RX ADMIN — ISOSORBIDE MONONITRATE 120 MG: 60 TABLET, EXTENDED RELEASE ORAL at 10:14

## 2019-01-01 RX ADMIN — INSULIN GLARGINE 30 UNITS: 100 INJECTION, SOLUTION SUBCUTANEOUS at 21:39

## 2019-01-01 RX ADMIN — INSULIN LISPRO 4 UNITS: 100 INJECTION, SOLUTION INTRAVENOUS; SUBCUTANEOUS at 21:59

## 2019-01-01 RX ADMIN — FUROSEMIDE 40 MG: 10 INJECTION, SOLUTION INTRAMUSCULAR; INTRAVENOUS at 17:14

## 2019-01-01 RX ADMIN — FUROSEMIDE 40 MG: 10 INJECTION, SOLUTION INTRAMUSCULAR; INTRAVENOUS at 23:19

## 2019-01-01 RX ADMIN — ZINC OXIDE 1 APPLICATION: 200 OINTMENT TOPICAL at 08:52

## 2019-01-01 RX ADMIN — LINAGLIPTIN 5 MG: 5 TABLET, FILM COATED ORAL at 10:45

## 2019-01-01 RX ADMIN — ZINC OXIDE 1 APPLICATION: 200 OINTMENT TOPICAL at 09:07

## 2019-01-01 RX ADMIN — METHADONE HYDROCHLORIDE 10 MG: 10 TABLET ORAL at 08:27

## 2019-01-01 RX ADMIN — OXYBUTYNIN CHLORIDE 5 MG: 5 TABLET, EXTENDED RELEASE ORAL at 10:30

## 2019-01-01 RX ADMIN — HEPARIN SODIUM 5000 UNITS: 5000 INJECTION INTRAVENOUS; SUBCUTANEOUS at 08:52

## 2019-09-23 NOTE — PROGRESS NOTES
"    Subjective:     Encounter Date:09/23/2019      Patient ID: Amanda Combs is a 83 y.o. female.    Chief Complaint:  History of Present Illness 83-year-old white female with history of coronary disease hypertension hyperlipidemia and diabetes presents to my office for follow-up.  Patient is currently stable without any symptoms of chest pain or shortness of breath at rest on exertion.  No complaints of any PND orthopnea.  No palpitations dizziness syncope.  She has some swelling of the feet.  She is taking her medicines regularly.  She is currently in a nursing home.  She does not smoke.  She is trying to be ambulatory and active.    The following portions of the patient's history were reviewed and updated as appropriate: allergies, current medications, past family history, past medical history, past social history, past surgical history and problem list.  Past Medical History:   Diagnosis Date   • Coronary artery disease    • Hyperlipidemia    • Hypertension      History reviewed. No pertinent surgical history.  /63 (BP Location: Left arm, Patient Position: Sitting)   Pulse 70   Ht 152.4 cm (60\")   Wt 88 kg (194 lb)   SpO2 (!) 85%   BMI 37.89 kg/m²   History reviewed. No pertinent family history.    Current Outpatient Medications:   •  acetaminophen (TYLENOL) 650 MG 8 hr tablet, ACETAMINOPHEN  MG CR-TABS, Disp: , Rfl:   •  ammonium lactate (LAC-HYDRIN) 12 % cream, LAC-HYDRIN 12 % CREA, Disp: , Rfl:   •  aspirin  MG tablet, 81 mg Daily., Disp: , Rfl:   •  atorvastatin (LIPITOR) 10 MG tablet, ATORVASTATIN CALCIUM 10 MG TABS, Disp: , Rfl:   •  Bisacodyl (CORRECT PO), CORRECT TBEC, Disp: , Rfl:   •  bumetanide (BUMEX) 2 MG tablet, Take 2 mg by mouth Daily., Disp: , Rfl:   •  busPIRone (BUSPAR) 7.5 MG tablet, Every 8 (Eight) Hours., Disp: , Rfl:   •  docusate sodium (COLACE) 100 MG capsule, COLACE 100 MG CAPS, Disp: , Rfl:   •  ergocalciferol (DRISDOL) 55741 units capsule, DRISDOL 85055 UNIT " CAPS, Disp: , Rfl:   •  febuxostat (ULORIC) 40 MG tablet, Daily., Disp: , Rfl:   •  ferrous sulfate 325 (65 FE) MG tablet, Daily., Disp: , Rfl:   •  gabapentin (NEURONTIN) 300 MG capsule, Take 300 mg by mouth 3 (Three) Times a Day., Disp: , Rfl:   •  hydrALAZINE (APRESOLINE) 25 MG tablet, Take 25 mg by mouth 3 (Three) Times a Day., Disp: , Rfl:   •  Insulin Glargine (LANTUS SOLOSTAR) 100 UNIT/ML injection pen, LANTUS SOLOSTAR 100 UNIT/ML SOPN, Disp: , Rfl:   •  isosorbide mononitrate (IMDUR) 60 MG 24 hr tablet, 120 mg Daily., Disp: , Rfl:   •  ketotifen (ZADITOR) 0.025 % ophthalmic solution, Every 12 (Twelve) Hours., Disp: , Rfl:   •  levothyroxine (SYNTHROID, LEVOTHROID) 112 MCG tablet, Take 112 mcg by mouth Daily., Disp: , Rfl:   •  linagliptin (TRADJENTA) 5 MG tablet tablet, Take 5 mg by mouth Daily., Disp: , Rfl:   •  Melatonin 3 MG capsule, Take  by mouth., Disp: , Rfl:   •  methadone (DOLOPHINE) 10 MG tablet, METHADONE HCL 10 MG TABS, Disp: , Rfl:   •  metoprolol tartrate (LOPRESSOR) 25 MG tablet, Take 25 mg by mouth 2 (Two) Times a Day., Disp: , Rfl:   •  Mirabegron ER (MYRBETRIQ) 25 MG tablet sustained-release 24 hour 24 hr tablet, Daily., Disp: , Rfl:   •  nitroglycerin (NITROSTAT) 0.4 MG SL tablet, NITROSTAT 0.4 MG SUBL, Disp: , Rfl:   •  ondansetron (ZOFRAN) 4 MG tablet, Take 4 mg by mouth Every 8 (Eight) Hours As Needed for Nausea or Vomiting., Disp: , Rfl:   •  pantoprazole (PROTONIX) 40 MG EC tablet, PROTONIX 20 MG TBEC, Disp: , Rfl:   •  Polyethylene Glycol 3350 (MIRALAX PO), MIRALAX PACK, Disp: , Rfl:   •  sertraline (ZOLOFT) 25 MG tablet, Daily., Disp: , Rfl:   •  furosemide (LASIX) 40 MG tablet, FUROSEMIDE 40 MG TABS, Disp: , Rfl:   •  glipiZIDE (GLUCOTROL) 10 MG tablet, Daily., Disp: , Rfl:   •  glucagon (GLUCAGON EMERGENCY) 1 MG injection, GLUCAGON EMERGENCY 1 MG KIT, Disp: , Rfl:   No Known Allergies  Social History     Socioeconomic History   • Marital status:      Spouse name: Not on  file   • Number of children: Not on file   • Years of education: Not on file   • Highest education level: Not on file   Tobacco Use   • Smoking status: Former Smoker   Substance and Sexual Activity   • Alcohol use: No     Frequency: Never     Review of Systems   Constitution: Positive for malaise/fatigue. Negative for fever.   HENT: Negative for ear pain and nosebleeds.    Eyes: Negative for blurred vision and double vision.   Cardiovascular: Positive for leg swelling. Negative for chest pain, dyspnea on exertion and palpitations.   Respiratory: Negative for cough and shortness of breath.    Skin: Negative for rash.   Musculoskeletal: Negative for joint pain.   Gastrointestinal: Negative for abdominal pain, nausea and vomiting.   Neurological: Negative for focal weakness and headaches.   Psychiatric/Behavioral: Negative for depression. The patient is not nervous/anxious.    All other systems reviewed and are negative.             Objective:     Physical Exam   Constitutional: She appears well-developed and well-nourished.   HENT:   Head: Normocephalic and atraumatic.   Eyes: Conjunctivae and EOM are normal. Pupils are equal, round, and reactive to light. No scleral icterus.   Neck: Normal range of motion. Neck supple. No JVD present. Carotid bruit is not present.   Cardiovascular: Normal rate, regular rhythm, S1 normal, S2 normal, normal heart sounds and intact distal pulses. PMI is not displaced.   Pulmonary/Chest: Effort normal and breath sounds normal. She has no wheezes. She has no rales.   Abdominal: Soft. Bowel sounds are normal.   Musculoskeletal: Normal range of motion.   Neurological: She is alert. She has normal strength.   No focal deficits   Skin: Skin is warm and dry. No rash noted.   Psychiatric: She has a normal mood and affect.     Procedures    Lab Review:       Assessment:          Diagnosis Plan   1. Coronary artery disease of native artery of native heart with stable angina pectoris (CMS/[facility])      2. Pure hypercholesterolemia     3. Essential hypertension     4. Controlled type 2 diabetes mellitus without complication, without long-term current use of insulin (CMS/[facility])            Plan:       Patient has history of coronary disease with nonobstructive disease and is currently stable on medications  Patient blood pressure and heart are stable per  Patient's lipid levels are followed by primary care doctor  Patient's sugar levels are followed by the primary care doctor  Continue current medicines and follow her in 6 months.

## 2019-10-17 PROBLEM — L89.152 PRESSURE INJURY OF SACRAL REGION, STAGE 2 (HCC): Status: ACTIVE | Noted: 2019-01-01

## 2019-10-17 PROBLEM — I50.9 ACUTE CONGESTIVE HEART FAILURE (HCC): Status: ACTIVE | Noted: 2019-01-01

## 2019-10-17 NOTE — CONSULTS
"GI CONSULT  NOTE:    Referring Provider:  Dr. Carlisle    Chief complaint: Anemia    Subjective \"I have had some shortness of breath\"    History of present illness:  Patient is an 84-year-old female with a history of CAD, chronic kidney disease and diabetes.  She presents with increased cough, weakness and shortness of breath.  Patient's daughter and granddaughter at bedside help with recent history.  She denies chest pain or abdominal pain.  No nausea, vomiting, heartburn or difficult he swallowing.  No blood in her stool has been noted but she does have dark stool.  She reports regular bowel habits without constipation or diarrhea with one bowel movement per day on MiraLAX daily.  She takes a daily aspirin but denies other NSAID use.  Tolerating diet without difficulty.  She has had recent antibiotic use as outpatient as well as a transfusion last week at [facility] for a hemoglobin of reportedly 6.  No obvious GI bleeding has been noted.      Endo History:  2/2012 colonoscopy by Dr. Mcneal shows adenomatous polyps, diverticulosis and hemorrhoids  3/2006 EGD and colonoscopy by Dr. Rhodes shows erosive esophagitis, gastritis, diverticulosis and hemorrhoids    Past Medical History:  Past Medical History:   Diagnosis Date   • CHF (congestive heart failure) (CMS/HCC)    • Coronary artery disease    • Diabetes mellitus (CMS/HCC)    • Disease of thyroid gland    • Elevated cholesterol    • GERD (gastroesophageal reflux disease)    • History of transfusion    • Hyperlipidemia    • Hypertension    • PONV (postoperative nausea and vomiting)        Past Surgical History:  Past Surgical History:   Procedure Laterality Date   • ABDOMINAL SURGERY     • BACK SURGERY     • CARDIAC CATHETERIZATION     • COLONOSCOPY     • EYE SURGERY     • HYSTERECTOMY         Social History:  Social History     Tobacco Use   • Smoking status: Former Smoker   Substance Use Topics   • Alcohol use: No     Frequency: Never   • Drug use: " No   No tobacco or alcohol use    Family History:  No family history on file.    Medications:  Medications Prior to Admission   Medication Sig Dispense Refill Last Dose   • acetaminophen (TYLENOL) 325 MG tablet Take 325 mg by mouth Every 6 (Six) Hours As Needed for Mild Pain .      • ammonium lactate (AMLACTIN) 12 % cream Apply 1 application topically to the appropriate area as directed Daily.      • aspirin 81 MG chewable tablet Chew 81 mg Daily.      • atorvastatin (LIPITOR) 10 MG tablet Take 10 mg by mouth Daily.      • bisacodyl (DULCOLAX) 5 MG EC tablet Take 5 mg by mouth Every Other Day.      • cefTRIAXone (ROCEPHIN) 1 g injection Inject 1 g into the appropriate muscle as directed by prescriber Daily.      • docusate sodium (COLACE) 100 MG capsule Take 100 mg by mouth 2 (Two) Times a Day.      • doxycycline (VIBRAMYCIN) 100 MG capsule Take 100 mg by mouth 2 (Two) Times a Day.      • Insulin Glargine (BASAGLAR KWIKPEN) 100 UNIT/ML injection pen Inject 30 Units under the skin into the appropriate area as directed 2 (Two) Times a Day.      • magnesium hydroxide (MILK OF MAGNESIA) 2400 MG/10ML suspension suspension Take 30 mL by mouth Daily As Needed.      • methadone (DOLOPHINE) 10 MG tablet Take 10 mg by mouth 3 (Three) Times a Day,      • pantoprazole (PROTONIX) 20 MG EC tablet Take 20 mg by mouth Daily.      • polyethylene glycol (MIRALAX) packet Take 17 g by mouth Daily.      • vitamin C (ASCORBIC ACID) 500 MG tablet Take 1,000 mg by mouth Daily.      • vitamin D (ERGOCALCIFEROL) 68491 units capsule capsule Take 50,000 Units by mouth 1 (One) Time Per Week.      • zinc oxide 20 % ointment Apply 1 application topically to the appropriate area as directed 2 (Two) Times a Day.      • bumetanide (BUMEX) 2 MG tablet Take 2 mg by mouth 2 (Two) Times a Day.   Taking   • busPIRone (BUSPAR) 7.5 MG tablet Every 8 (Eight) Hours.   Taking   • febuxostat (ULORIC) 40 MG tablet Take 40 mg by mouth Daily.   Taking   • ferrous  sulfate 325 (65 FE) MG tablet Take 325 mg by mouth Daily.   Taking   • gabapentin (NEURONTIN) 300 MG capsule Take 300 mg by mouth 2 (Two) Times a Day.   Taking   • hydrALAZINE (APRESOLINE) 25 MG tablet Take 25 mg by mouth 3 (Three) Times a Day.   Taking   • isosorbide mononitrate (IMDUR) 60 MG 24 hr tablet Take 120 mg by mouth Daily.   Patient Taking Differently   • ketotifen (ZADITOR) 0.025 % ophthalmic solution Administer 1 drop to both eyes 2 (Two) Times a Day.   Taking   • levothyroxine (SYNTHROID, LEVOTHROID) 112 MCG tablet Take 112 mcg by mouth Daily.   Taking   • linagliptin (TRADJENTA) 5 MG tablet tablet Take 5 mg by mouth Daily.   Taking   • Melatonin 3 MG capsule Take 3 mg by mouth Daily.   Taking   • metoprolol tartrate (LOPRESSOR) 25 MG tablet Take 25 mg by mouth 2 (Two) Times a Day.   Taking   • Mirabegron ER (MYRBETRIQ) 25 MG tablet sustained-release 24 hour 24 hr tablet Take 25 mg by mouth Daily.   Taking   • ondansetron (ZOFRAN) 4 MG tablet Take 4 mg by mouth Every 6 (Six) Hours As Needed for Nausea or Vomiting.   Taking   • sertraline (ZOLOFT) 25 MG tablet Take 25 mg by mouth Daily.   Taking       Scheduled Meds:  ammonium lactate 1 application Topical Daily   [START ON 10/18/2019] aspirin 81 mg Oral Daily   atorvastatin 10 mg Oral Daily   bisacodyl 5 mg Oral Every Other Day   ceftriaxone 1 g Intravenous Q24H   cholecalciferol 1,000 Units Oral Daily   docusate sodium 100 mg Oral BID   doxycycline 100 mg Oral Q12H   febuxostat 40 mg Oral Daily   ferrous sulfate 324 mg Oral Daily With Breakfast   furosemide 40 mg Intravenous Q8H   gabapentin 300 mg Oral Nightly   hydrALAZINE 25 mg Oral TID   insulin glargine 30 Units Subcutaneous Nightly   insulin lispro 0-9 Units Subcutaneous 4x Daily With Meals & Nightly   isosorbide mononitrate 120 mg Oral Daily   ketotifen 1 drop Both Eyes BID   levothyroxine 112 mcg Oral Daily   linagliptin 5 mg Oral Daily   methadone 10 mg Oral TID   metoprolol tartrate 25 mg  Oral Q12H   miconazole  Topical Q12H   nicotine 1 patch Transdermal Q24H   oxybutynin XL 5 mg Oral Daily   pantoprazole 40 mg Oral Daily   polyethylene glycol 17 g Oral Daily   sertraline 25 mg Oral Daily   sodium chloride 10 mL Intravenous Q12H   vitamin C 1,000 mg Oral Daily   zinc oxide 1 application Topical BID     Continuous Infusions:   PRN Meds:.•  acetaminophen **OR** acetaminophen **OR** acetaminophen  •  Calcium Gluconate-NaCl **AND** calcium gluconate **AND** Calcium  •  dextrose  •  dextrose  •  docusate sodium  •  glucagon (human recombinant)  •  ketamine (KETALAR) infusion **AND** Ketamine Vital Signs & Assessment  •  magnesium hydroxide  •  magnesium sulfate **OR** magnesium sulfate **OR** magnesium sulfate  •  melatonin  •  nitroglycerin  •  ondansetron  •  ondansetron  •  potassium chloride  •  potassium chloride  •  sodium chloride    ALLERGIES:  Patient has no known allergies.    ROS:  Review of Systems   Constitutional: Negative for chills and fever.   HENT: Negative for nosebleeds and trouble swallowing.    Respiratory: Positive for shortness of breath. Negative for cough.    Cardiovascular: Negative for chest pain and palpitations.   Gastrointestinal: Negative for abdominal distention, abdominal pain, anal bleeding, blood in stool, constipation, diarrhea, nausea and vomiting.   Genitourinary: Negative for difficulty urinating and dysuria.   Musculoskeletal: Negative for gait problem.   Skin: Negative for color change and rash.   Neurological: Positive for weakness. Negative for syncope.   Psychiatric/Behavioral: Negative for agitation and confusion.       Objective Resting in bed, no acute distress, family at bedside    Vital Signs:   Temp:  [97.4 °F (36.3 °C)-98.7 °F (37.1 °C)] 97.4 °F (36.3 °C)  Heart Rate:  [51-61] 55  Resp:  [16-18] 16  BP: (114-157)/(31-73) 143/73    Physical Exam:      General Appearance:    Awake and alert, in no acute distress   Head:    Normocephalic, without obvious  abnormality, atraumatic   Eyes:            Conjunctivae normal, anicteric sclera   Ears:    Ears appear intact with no abnormalities noted   Throat:   No oral lesions, no thrush, oral mucosa moist   Neck:   supple, no thyromegaly, no JVD   Lungs:     respirations regular, even and unlabored    Heart:    Regular rhythm and normal rate, normal S1 and S2   Chest Wall:    No abnormalities observed   Abdomen:     Normal bowel sounds, soft, non-tender, no rebound or guarding, non-distended   Rectal:     Deferred   Extremities:   Moves all extremities well, no cyanosis, no             redness   Pulses:   Pulses palpable and equal bilaterally   Skin:   No bleeding, bruising or rash, no jaundice   Lymph nodes:   No palpable adenopathy   Neurologic:   Cranial nerves 2 - 12 grossly intact, sensation intact       Results Review:   I reviewed the patient's labs and imaging.  Lab Results (last 24 hours)     Procedure Component Value Units Date/Time    POC Glucose Once [[id number]]  (Normal) Collected:  10/17/19 1356    Specimen:  Blood Updated:  10/17/19 1359     Glucose 81 mg/dL      Comment: Serial Number: [id number]:  [id number]       POC Glucose Once [[id number]]  (Abnormal) Collected:  10/17/19 1332    Specimen:  Blood Updated:  10/17/19 1333     Glucose 61 mg/dL      Comment: Serial Number: [id number]:  [id number]       POC Glucose Once [[id number]]  (Abnormal) Collected:  10/17/19 1316    Specimen:  Blood Updated:  10/17/19 1322     Glucose 54 mg/dL      Comment: Serial Number: [id number]:  [id number]       Hemoglobin A1c [[id number]]  (Normal) Collected:  10/17/19 0844    Specimen:  Blood Updated:  10/17/19 1312     Hemoglobin A1C 5.4 %     Narrative:       Hemoglobin A1C Reference Range:    <5.7 %        Normal  5.7-6.4 %     Increased risk for diabetes  > 6.4 %        Diabetes       These guidelines have been recommended by the American Diabetic Association for Hgb A1c.      The following 2010 guidelines have  been recommended by the American Diabetes Association for Hemoglobin A1c.    HBA1c 5.7-6.4% Increased risk for future diabetes (pre-diabetes)  HBA1c     >6.4% Diabetes    Comprehensive Metabolic Panel [[id number]]  (Abnormal) Collected:  10/17/19 0843    Specimen:  Blood Updated:  10/17/19 1306     Glucose 46 mg/dL      BUN 62 mg/dL      Creatinine 2.66 mg/dL      Sodium 141 mmol/L      Potassium 4.9 mmol/L      Chloride 104 mmol/L      CO2 26.0 mmol/L      Calcium 8.4 mg/dL      Total Protein 6.6 g/dL      Albumin 3.30 g/dL      ALT (SGPT) 8 U/L      AST (SGOT) 15 U/L      Alkaline Phosphatase 74 U/L      Total Bilirubin 0.3 mg/dL      eGFR Non African Amer 17 mL/min/1.73      Globulin 3.3 gm/dL      A/G Ratio 1.0 g/dL      BUN/Creatinine Ratio 23.3     Anion Gap 15.9 mmol/L     Narrative:       GFR Normal >60  Chronic Kidney Disease <60  Kidney Failure <15    CK [[id number]]  (Normal) Collected:  10/17/19 0843    Specimen:  Blood Updated:  10/17/19 1302     Creatine Kinase 41 U/L     Lipid Panel [[id number]] Collected:  10/17/19 0843    Specimen:  Blood Updated:  10/17/19 1302     Total Cholesterol 119 mg/dL      Triglycerides 109 mg/dL      HDL Cholesterol 43 mg/dL      LDL Cholesterol  54 mg/dL      VLDL Cholesterol 21.8 mg/dL      LDL/HDL Ratio 1.26    Narrative:       Cholesterol Reference Ranges  (U.S. Department of Health and Human Services ATP III Classifications)    Desirable          <200 mg/dL  Borderline High    200-239 mg/dL  High Risk          >240 mg/dL      Triglyceride Reference Ranges  (U.S. Department of Health and Human Services ATP III Classifications)    Normal           <150 mg/dL  Borderline High  150-199 mg/dL  High             200-499 mg/dL  Very High        >500 mg/dL    HDL Reference Ranges  (U.S. Department of Health and Human Services ATP III Classifcations)    Low     <40 mg/dl (major risk factor for CHD)  High    >60 mg/dl ('negative' risk factor for CHD)        LDL Reference  "Ranges  (U.S. Department of Health and Human Services ATP III Classifcations)    Optimal          <100 mg/dL  Near Optimal     100-129 mg/dL  Borderline High  130-159 mg/dL  High             160-189 mg/dL  Very High        >189 mg/dL    Magnesium [[id number]]  (Normal) Collected:  10/17/19 0843    Specimen:  Blood Updated:  10/17/19 1302     Magnesium 2.2 mg/dL     Phosphorus [[id number]]  (Abnormal) Collected:  10/17/19 0843    Specimen:  Blood Updated:  10/17/19 1302     Phosphorus 5.5 mg/dL     C-reactive Protein [[id number]]  (Abnormal) Collected:  10/17/19 0843    Specimen:  Blood Updated:  10/17/19 1302     C-Reactive Protein 2.55 mg/dL     Procalcitonin [[id number]]  (Normal) Collected:  10/17/19 0843    Specimen:  Blood Updated:  10/17/19 1256     Procalcitonin 0.11 ng/mL     Narrative:       As a Marker for Sepsis (Non-Neonates):   1. <0.5 ng/mL represents a low risk of severe sepsis and/or septic shock.  1. >2 ng/mL represents a high risk of severe sepsis and/or septic shock.    As a Marker for Lower Respiratory Tract Infections that require antibiotic therapy:  PCT on Admission     Antibiotic Therapy             6-12 Hrs later  > 0.5                Strongly Recommended            >0.25 - <0.5         Recommended  0.1 - 0.25           Discouraged                   Remeasure/reassess PCT  <0.1                 Strongly Discouraged          Remeasure/reassess PCT      As 28 day mortality risk marker: \"Change in Procalcitonin Result\" (> 80 % or <=80 %) if Day 0 (or Day 1) and Day 4 values are available. Refer to http://www.[employer]-pct-calculator.com/   Change in PCT <=80 %   A decrease of PCT levels below or equal to 80 % defines a positive change in PCT test result representing a higher risk for 28-day all-cause mortality of patients diagnosed with severe sepsis or septic shock.  Change in PCT > 80 %   A decrease of PCT levels of more than 80 % defines a negative change in PCT result representing a lower risk for " 28-day all-cause mortality of patients diagnosed with severe sepsis or septic shock.                Ferritin [[id number]]  (Normal) Collected:  10/17/19 0843    Specimen:  Blood Updated:  10/17/19 1256     Ferritin 90.76 ng/mL     TSH [[id number]]  (Abnormal) Collected:  10/17/19 0843    Specimen:  Blood Updated:  10/17/19 1256     TSH 10.650 uIU/mL      Comment: Results may be falsely decreased if patient taking Biotin.       BNP [[id number]]  (Abnormal) Collected:  10/17/19 0843    Specimen:  Blood Updated:  10/17/19 1256     proBNP 7,805.0 pg/mL     Narrative:       Among patients with dyspnea, NT-proBNP is highly sensitive for the detection of acute congestive heart failure. In addition NT-proBNP of <300 pg/ml effectively rules out acute congestive heart failure with 99% negative predictive value.    Troponin [[id number]]  (Abnormal) Collected:  10/17/19 0843    Specimen:  Blood Updated:  10/17/19 1211     Troponin T 0.037 ng/mL     Narrative:       Troponin T Reference Range:  <= 0.03 ng/mL-   Negative for AMI  >0.03 ng/mL-     Abnormal for myocardial necrosis.  Clinicians would have to utilize clinical acumen, EKG, Troponin and serial changes to determine if it is an Acute Myocardial Infarction or myocardial injury due to an underlying chronic condition.     Uric Acid [[id number]]  (Abnormal) Collected:  10/17/19 0843    Specimen:  Blood Updated:  10/17/19 1147     Uric Acid 5.8 mg/dL     POC Glucose Once [[id number]]  (Normal) Collected:  10/17/19 1135    Specimen:  Blood Updated:  10/17/19 1140     Glucose 84 mg/dL      Comment: Serial Number: [id number]:  [id number]       Lactic Acid, Plasma [[id number]]  (Normal) Collected:  10/17/19 0844    Specimen:  Blood Updated:  10/17/19 1119     Lactate 1.0 mmol/L     Sedimentation Rate [[id number]]  (Abnormal) Collected:  10/17/19 0844    Specimen:  Blood Updated:  10/17/19 1108     Sed Rate 69 mm/hr     CBC Auto Differential [[id number]]  (Abnormal) Collected:   10/17/19 0844    Specimen:  Blood Updated:  10/17/19 1011     WBC 7.00 10*3/mm3      RBC 3.16 10*6/mm3      Hemoglobin 8.6 g/dL      Hematocrit 28.6 %      MCV 90.4 fL      MCH 27.3 pg      MCHC 30.2 g/dL      RDW 21.8 %      RDW-SD 70.9 fl      MPV 9.2 fL      Platelets 110 10*3/mm3      Neutrophil % 71.4 %      Lymphocyte % 15.9 %      Monocyte % 7.4 %      Eosinophil % 4.3 %      Basophil % 1.0 %      Neutrophils, Absolute 5.00 10*3/mm3      Lymphocytes, Absolute 1.10 10*3/mm3      Monocytes, Absolute 0.50 10*3/mm3      Eosinophils, Absolute 0.30 10*3/mm3      Basophils, Absolute 0.10 10*3/mm3      nRBC 0.5 /100 WBC     Vitamin B12 [[id number]] Collected:  10/17/19 0843    Specimen:  Blood Updated:  10/17/19 1006    Folate [[id number]] Collected:  10/17/19 0843    Specimen:  Blood Updated:  10/17/19 1006    POC Glucose Once [[id number]]  (Normal) Collected:  10/17/19 0738    Specimen:  Blood Updated:  10/17/19 0742     Glucose 71 mg/dL      Comment: Serial Number: [id number]:  [id number]       Urinalysis With Culture If Indicated - Urine, Catheter In/Out [[id number]]  (Abnormal) Collected:  10/17/19 0228    Specimen:  Urine, Catheter In/Out Updated:  10/17/19 0304     Color, UA Yellow     Appearance, UA Cloudy     Comment: Result checked         pH, UA 5.5     Specific Gravity, UA 1.012     Glucose, UA Negative     Ketones, UA Negative     Bilirubin, UA Negative     Blood, UA Negative     Protein, UA >=300 mg/dL (3+)     Leuk Esterase, UA Negative     Nitrite, UA Negative     Urobilinogen, UA 0.2 E.U./dL    Urinalysis, Microscopic Only - Urine, Catheter In/Out [[id number]]  (Abnormal) Collected:  10/17/19 0228    Specimen:  Urine, Catheter In/Out Updated:  10/17/19 0304     RBC, UA 0-2 /HPF      WBC, UA 0-2 /HPF      Bacteria, UA None Seen /HPF      Squamous Epithelial Cells, UA 0-2 /HPF      Hyaline Casts, UA 0-2 /LPF      Methodology Manual Light Microscopy    Respiratory Panel, PCR - Swab, Nasopharynx  [[id number]]  (Normal) Collected:  10/17/19 0025    Specimen:  Swab from Nasopharynx Updated:  10/17/19 0222     ADENOVIRUS, PCR Not Detected     Coronavirus 229E Not Detected     Coronavirus HKU1 Not Detected     Coronavirus NL63 Not Detected     Coronavirus OC43 Not Detected     Human Metapneumovirus Not Detected     Human Rhinovirus/Enterovirus Not Detected     Influenza B PCR Not Detected     Parainfluenza Virus 1 Not Detected     Parainfluenza Virus 2 Not Detected     Parainfluenza Virus 3 Not Detected     Parainfluenza Virus 4 Not Detected     Bordetella pertussis pcr Not Detected     Influenza A H1 2009 PCR Not Detected     Chlamydophila pneumoniae PCR Not Detected     Mycoplasma pneumo by PCR Not Detected     Influenza A PCR Not Detected     Influenza A H3 Not Detected     Influenza A H1 Not Detected     RSV, PCR Not Detected    Extra Tubes [[id number]] Collected:  10/17/19 0019    Specimen:  Blood, Venous Line Updated:  10/17/19 0130    Narrative:       The following orders were created for panel order Extra Tubes.  Procedure                               Abnormality         Status                     ---------                               -----------         ------                     Gold Top - SST[[id number]]                                   Final result                 Please view results for these tests on the individual orders.    Gold Top - SST [[id number]] Collected:  10/17/19 0019    Specimen:  Blood Updated:  10/17/19 0130     Extra Tube Hold for add-ons.     Comment: Auto resulted.       Troponin [[id number]]  (Abnormal) Collected:  10/17/19 0019    Specimen:  Blood Updated:  10/17/19 0059     Troponin T 0.043 ng/mL     Narrative:       Troponin T Reference Range:  <= 0.03 ng/mL-   Negative for AMI  >0.03 ng/mL-     Abnormal for myocardial necrosis.  Clinicians would have to utilize clinical acumen, EKG, Troponin and serial changes to determine if it is an Acute Myocardial Infarction or myocardial  injury due to an underlying chronic condition.     Comprehensive Metabolic Panel [[id number]]  (Abnormal) Collected:  10/17/19 0019    Specimen:  Blood Updated:  10/17/19 0051     Glucose 122 mg/dL      BUN 61 mg/dL      Creatinine 2.72 mg/dL      Sodium 141 mmol/L      Potassium 5.2 mmol/L      Chloride 103 mmol/L      CO2 29.0 mmol/L      Calcium 8.6 mg/dL      Total Protein 6.8 g/dL      Albumin 3.50 g/dL      ALT (SGPT) 9 U/L      AST (SGOT) 18 U/L      Alkaline Phosphatase 75 U/L      Total Bilirubin 0.3 mg/dL      eGFR Non African Amer 17 mL/min/1.73      Globulin 3.3 gm/dL      A/G Ratio 1.1 g/dL      BUN/Creatinine Ratio 22.4     Anion Gap 14.2 mmol/L     Narrative:       GFR Normal >60  Chronic Kidney Disease <60  Kidney Failure <15    BNP [[id number]]  (Abnormal) Collected:  10/17/19 0019    Specimen:  Blood Updated:  10/17/19 0050     proBNP 8,150.0 pg/mL     Narrative:       Among patients with dyspnea, NT-proBNP is highly sensitive for the detection of acute congestive heart failure. In addition NT-proBNP of <300 pg/ml effectively rules out acute congestive heart failure with 99% negative predictive value.    Protime-INR [[id number]]  (Normal) Collected:  10/17/19 0019    Specimen:  Blood Updated:  10/17/19 0033     Protime 10.3 Seconds      INR 0.98    aPTT [[id number]]  (Normal) Collected:  10/17/19 0019    Specimen:  Blood Updated:  10/17/19 0033     PTT 25.3 seconds     Blood Culture - Blood, Hand, Right [[id number]] Collected:  10/17/19 0023    Specimen:  Blood from Hand, Right Updated:  10/17/19 0028    CBC & Differential [[id number]] Collected:  10/17/19 0019    Specimen:  Blood Updated:  10/17/19 0026    Narrative:       The following orders were created for panel order CBC & Differential.  Procedure                               Abnormality         Status                     ---------                               -----------         ------                     CBC Auto Differential[[id number]]         Abnormal            Final result                 Please view results for these tests on the individual orders.    CBC Auto Differential [[id number]]  (Abnormal) Collected:  10/17/19 0019    Specimen:  Blood Updated:  10/17/19 0026     WBC 9.50 10*3/mm3      RBC 3.11 10*6/mm3      Hemoglobin 8.6 g/dL      Hematocrit 27.7 %      MCV 89.0 fL      MCH 27.5 pg      MCHC 30.9 g/dL      RDW 21.0 %      RDW-SD 66.5 fl      MPV 8.6 fL      Platelets 148 10*3/mm3      Neutrophil % 74.6 %      Lymphocyte % 13.7 %      Monocyte % 6.9 %      Eosinophil % 3.7 %      Basophil % 1.1 %      Neutrophils, Absolute 7.10 10*3/mm3      Lymphocytes, Absolute 1.30 10*3/mm3      Monocytes, Absolute 0.70 10*3/mm3      Eosinophils, Absolute 0.40 10*3/mm3      Basophils, Absolute 0.10 10*3/mm3      nRBC 0.3 /100 WBC     Blood Culture - Blood, Arm, Left [[id number]] Collected:  10/17/19 0019    Specimen:  Blood from Arm, Left Updated:  10/17/19 0023          Imaging Results (last 24 hours)     Procedure Component Value Units Date/Time    US Renal Bilateral [[id number]] Collected:  10/17/19 0945     Updated:  10/17/19 0949    Narrative:       Examination: US RENAL BILATERAL-     Date of Exam: 10/17/2019 9:08 AM     Indication: ARF/CRF; I50.9-Heart failure, unspecified; R79.89-Other  specified abnormal findings of blood chemistry.     Comparison: 3/22/2019.     Technique: Grayscale and color Doppler ultrasound evaluation of the  kidneys and urinary bladder was performed     Findings:  Study is mildly limited by the patient's body habitus. Bilateral renal  parenchymal atrophy with diffusely increased bilateral renal  echogenicity, likely reflecting medical renal disease. No solid renal  mass, shadowing stone, or hydronephrosis. The right kidney measures 9.8  cm in length. The left kidney measures 8.1 cm in length. Limited imaging  of the urinary bladder is unremarkable.       Impression:       Bilateral renal parenchymal atrophy and diffusely  increased bilateral  renal echogenicity, likely reflecting medical renal disease.     Electronically Signed By-Natalio Chanel On:10/17/2019 9:46 AM  This report was finalized on [id number] by  Natalio Chanel, .    XR Chest 1 View [[id number]] Collected:  10/17/19 0715     Updated:  10/17/19 0719    Narrative:       DATE OF EXAM:  10/16/2019 11:55 PM     PROCEDURE:  XR CHEST 1 VW-     INDICATIONS:  soa     COMPARISON:  Radiograph 4/11/2019, 3/22/2019, and 6/1/2017.     TECHNIQUE:   Single radiographic AP view of the chest was obtained.     FINDINGS:  The study is limited by lordotic patient positioning. Overlying  artifacts. Small to moderate bilateral pleural effusions with basilar  predominant opacification of both lungs. Indistinct pulmonary  vasculature. No pneumothorax. Stable cardiomegaly, likely accentuated by  technique. Calcified atherosclerotic disease in the thoracic aorta.  Partially visualized thoracic spondylosis.        Impression:       Limited study demonstrating stable cardiomegaly, suspected moderate  pulmonary edema, and small to moderate bilateral pleural effusions with  underlying bibasilar atelectasis and/or pneumonia.     Electronically Signed By-Natalio Chanel On:10/17/2019 7:17 AM  This report was finalized on [id number] by  Natalio Chanel, .             ASSESSMENT:    Normocytic anemia -acute on chronic  Dyspnea  CHF with history of CAD  CKD  Type 2 diabetes  Chronic pain      PLAN:  Continue PPI and MiraLAX daily.  Currently on Rocephin.  Nephrology following.  Will hold off on endoscopic evaluation and less active GI bleeding is noted.  Monitor hemoglobin closely.  Avoid NSAIDs.  Continue supportive care will follow.  Discussed with family at bedside.      I discussed the patients findings and my recommendations with the patient.  Hilary Mcintosh, VANNESSA  10/17/19  3:34 PM

## 2019-10-17 NOTE — DISCHARGE PLACEMENT REQUEST
"Nigel Quintanilla (84 y.o. Female)     Date of Birth Social Security Number Address Home Phone MRN    1935  [address] [place] [address] [address] [place] [address] [phone] [id number]    Restoration Marital Status          None        Admission Date Admission Type Admitting Provider Attending Provider Department, Room/Bed    10/16/19 Emergency Ben Carlisle MD Lohano, Suresh, MD [facility] 3C MEDICAL INPATIENT, 381/1    Discharge Date Discharge Disposition Discharge Destination                       Attending Provider:  Ben Carlisle MD    Allergies:  No Known Allergies    Isolation:  None   Infection:  None   Code Status:  CPR    Ht:  152.4 cm (60\")   Wt:  102 kg (224 lb 13.9 oz)    Admission Cmt:  None   Principal Problem:  None                Active Insurance as of 10/16/2019     Primary Coverage     Payor Plan Insurance Group Employer/Plan Group    MEDICARE MEDICARE A & B      Payor Plan Address Payor Plan Phone Number Payor Plan Fax Number Effective Dates    [address] [phone]  2/1/1996 - None Entered    [facility] [id number]       Subscriber Name Subscriber Birth Date Member ID       NIGEL QUINTANILLA 1935 [id number]           Secondary Coverage     Payor Plan Insurance Group Employer/Plan Group    [place]      Payor Plan Address Payor Plan Phone Number Payor Plan Fax Number Effective Dates    [address]   9/20/2019 - None Entered    [place] [address]       Subscriber Name Subscriber Birth Date Member ID       NIGEL QUINTANILLA 1935 [id number]                 Emergency Contacts      (Rel.) Home Phone Work Phone Mobile Phone    REGINEGEOFFREY (Daughter) -- -- [phone]              "

## 2019-10-17 NOTE — NURSING NOTE
Lab called in a glucose level of 46 at 1305. Blood sugar was recheck and resulted at 54. Orange juice was given, and glucose level will be rechecked in 15 minutes.

## 2019-10-17 NOTE — ED NOTES
Patient brought in due to having some abnormal labs and was supposed to receive blood in [facility] but they were having issues with IVs so patient came here.  Family reports that she is anemic.  Patient doesn't have any complaints at time of arrival other than being sent in here.       Juaquin Gonzalez, RN  10/17/19 0343

## 2019-10-17 NOTE — PROGRESS NOTES
Discharge Planning Assessment  [facility]     Patient Name: Amanda Combs  MRN: [id number]  Today's Date: 10/17/2019    Admit Date: 10/16/2019        Discharge Plan     Row Name 10/17/19 0931       Plan    Plan  Ok to return to [facility] and [facility],no PASRR or precert needed    Patient/Family in Agreement with Plan  yes        Destination - Selection Complete      Service Provider Request Status Selected Services Address Phone Number Fax Number    [facility] [address] [place] [id number] [phone]       Jody Vazquez 10/17/2019 0931    Ok to return to facility LTC per Rosetta,no PASRR or precert needed                 Bobbi Vazquez, STEPHANIEW, LSW  [phone]

## 2019-10-17 NOTE — PLAN OF CARE
Problem: Patient Care Overview  Goal: Plan of Care Review  Outcome: Ongoing (interventions implemented as appropriate)   10/17/19 5440   Coping/Psychosocial   Plan of Care Reviewed With patient   Plan of Care Review   Progress no change   OTHER   Outcome Summary patient resting in bed throughout shift. patient diuresed during shift with urine output. post void residual of 1 mL. no complaints of pain during shift. will continue to monitor,     Goal: Individualization and Mutuality  Outcome: Ongoing (interventions implemented as appropriate)    Goal: Discharge Needs Assessment  Outcome: Ongoing (interventions implemented as appropriate)    Goal: Interprofessional Rounds/Family Conf  Outcome: Ongoing (interventions implemented as appropriate)      Problem: Cardiac: Heart Failure (Adult)  Goal: Signs and Symptoms of Listed Potential Problems Will be Absent, Minimized or Managed (Cardiac: Heart Failure)  Outcome: Ongoing (interventions implemented as appropriate)      Problem: Skin Injury Risk (Adult)  Goal: Identify Related Risk Factors and Signs and Symptoms  Outcome: Ongoing (interventions implemented as appropriate)    Goal: Skin Health and Integrity  Outcome: Ongoing (interventions implemented as appropriate)      Problem: Wound (Includes Pressure Injury) (Adult)  Goal: Signs and Symptoms of Listed Potential Problems Will be Absent, Minimized or Managed (Wound)  Outcome: Ongoing (interventions implemented as appropriate)

## 2019-10-17 NOTE — CONSULTS
Diabetes Education    Patient Name:  Amanda Combs  YOB: 1935  MRN: [id number]  Admit Date:  10/16/2019    Consult received by MD to teach bs monitoring. Pt comes from [facility] and [facility] and will be returning to [facility] after d/c. Pt has been at this facility for 3 years. Daughter at bedside and helping to answer assessment questions. Pt takes Lantus 30 bid and Tradjenta 5 mg daily and nursing staff giving pt her meds and checking pt's bs. Discussed with pt/daughter her current A1c of 5.4%. Reviewed that this result may be too low for pt's age. Pt had a low bs this am at 0843 of 46 mg/dl. Encouraged daughter to discuss with MD the possibility of having diabetes meds adjusted. Discussed with pt signs/symptoms of hypoglycemia and discussed importance of putting call light on if not feeling well. Pt states she does know how it feels to have a low bs and will let nursing staff know if bs drops. Gave A1c info sheet. Pt/daughter with no additional education needs at present. Pt is not needing to learn to check bs since nursing staff is meeting this need.      Electronically signed by:  Sowmya Rubin RN  10/17/19 5:51 PM

## 2019-10-17 NOTE — PROGRESS NOTES
Wound Initial Evaluation   [facility]     Patient Name: Amanda Combs  : 1935  MRN: [id number]  Today's Date: 10/17/2019 Room Number: 381/1      Admit Date: 10/16/2019  Attending: Ben Carlisle MD    Consult Requested By: Dr. Carlisle    Reason For Consult: Stage II sacral pressure injury    Chief Complaint: Patient resting in bed.  She is currently on an SAT surface.  Patient is able to turn self with minimal assistance.  She denies any recent of diarrhea or loose stools.  She is currently in rehab, only at the hospital for UTI approximately 2 weeks    Visit Dx:    ICD-10-CM ICD-9-CM   1. Acute congestive heart failure, unspecified heart failure type (CMS/HCC) I50.9 428.0   2. Elevated troponin R79.89 790.6     Patient Active Problem List   Diagnosis   • Coronary artery disease   • Depression   • Diabetes mellitus type II, controlled (CMS/HCC)   • Encounter for screening   • Essential hypertension   • Gastroesophageal reflux disease   • Gout   • Hip pain, right   • Hyperlipidemia   • Need for prophylactic vaccination against Streptococcus pneumoniae (pneumococcus)   • Proteinuria   • Pancytopenia (CMS/HCC)   • Other dorsalgia   • Status post coronary artery stent placement   • Ureteral obstruction   • Urinary tract infection   • Acute congestive heart failure (CMS/HCC)   • Pressure injury of sacral region, stage 2 (CMS/HCC)       History:   Past Medical History:   Diagnosis Date   • CHF (congestive heart failure) (CMS/HCC)    • Coronary artery disease    • Diabetes mellitus (CMS/HCC)    • Disease of thyroid gland    • Elevated cholesterol    • GERD (gastroesophageal reflux disease)    • History of transfusion    • Hyperlipidemia    • Hypertension    • PONV (postoperative nausea and vomiting)      Past Surgical History:   Procedure Laterality Date   • ABDOMINAL SURGERY     • BACK SURGERY     • CARDIAC CATHETERIZATION     • COLONOSCOPY     • EYE SURGERY     • HYSTERECTOMY       Social History      Socioeconomic History   • Marital status:      Spouse name: Not on file   • Number of children: Not on file   • Years of education: Not on file   • Highest education level: Not on file   Tobacco Use   • Smoking status: Former Smoker   Substance and Sexual Activity   • Alcohol use: No     Frequency: Never   • Drug use: No   • Sexual activity: Defer       Allergies:  No Known Allergies    Medications:    Current Facility-Administered Medications:   •  acetaminophen (TYLENOL) tablet 650 mg, 650 mg, Oral, Q4H PRN **OR** acetaminophen (TYLENOL) 160 MG/5ML solution 650 mg, 650 mg, Oral, Q4H PRN **OR** acetaminophen (TYLENOL) suppository 650 mg, 650 mg, Rectal, Q4H PRN, Ben Carlisle MD  •  ammonium lactate (AMLACTIN) 12 % cream 1 application, 1 application, Topical, Daily, Ben Carlisle MD  •  [START ON 10/18/2019] aspirin chewable tablet 81 mg, 81 mg, Oral, Daily, Ben Carlisle MD  •  atorvastatin (LIPITOR) tablet 10 mg, 10 mg, Oral, Daily, Ben Carlisle MD, 10 mg at 10/17/19 1046  •  bisacodyl (DULCOLAX) EC tablet 5 mg, 5 mg, Oral, Every Other Day, Ben Carlisle MD, 5 mg at 10/17/19 1047  •  calcium gluconate 1g/50ml 0.675% NaCl IV SOLN, 1 g, Intravenous, PRN **AND** calcium gluconate 2 g/100 mL NS IVPB, 2 g, Intravenous, PRN **AND** Calcium, , , PRN, Ben Carlisle MD  •  cefTRIAXone (ROCEPHIN) 1 g in sodium chloride 0.9 % 100 mL IVPB, 1 g, Intravenous, Q24H, Ben Carlisle MD  •  cholecalciferol (VITAMIN D3) tablet 1,000 Units, 1,000 Units, Oral, Daily, Ben Carlisle MD, 1,000 Units at 10/17/19 1046  •  dextrose (D50W) 25 g/ 50mL Intravenous Solution 25 g, 25 g, Intravenous, Q15 Min PRN, Ben Carlisle MD  •  dextrose (GLUTOSE) oral gel 15 g, 15 g, Oral, Q15 Min PRN, Ben Carlisle MD  •  docusate sodium (COLACE) capsule 100 mg, 100 mg, Oral, BID, Ben Carlisle MD, 100 mg at 10/17/19 1045  •  docusate sodium (COLACE) capsule 100 mg, 100 mg, Oral, BID PRN, Ben Carlisle MD  •  doxycycline  (ADOXA) tablet 100 mg, 100 mg, Oral, Q12H, Ben Carlisle MD, 100 mg at 10/17/19 1045  •  febuxostat (ULORIC) tablet 40 mg, 40 mg, Oral, Daily, Ben Carlisle MD, 40 mg at 10/17/19 1044  •  ferrous sulfate EC tablet 324 mg, 324 mg, Oral, Daily With Breakfast, Ben Carlisle MD, 324 mg at 10/17/19 1044  •  furosemide (LASIX) injection 40 mg, 40 mg, Intravenous, Q8H, Diana Sesay MD  •  gabapentin (NEURONTIN) capsule 300 mg, 300 mg, Oral, Nightly, Ben Carlisle MD  •  glucagon (human recombinant) (GLUCAGEN DIAGNOSTIC) injection 1 mg, 1 mg, Subcutaneous, Q15 Min PRN, Ben Carlisle MD  •  hydrALAZINE (APRESOLINE) tablet 25 mg, 25 mg, Oral, TID, Ben Carlisle MD, 25 mg at 10/17/19 1046  •  insulin glargine (LANTUS) injection 30 Units, 30 Units, Subcutaneous, Nightly, Ben Carlisle MD  •  insulin lispro (humaLOG) injection 0-9 Units, 0-9 Units, Subcutaneous, 4x Daily With Meals & Nightly, Ben Carlisle MD  •  isosorbide mononitrate (IMDUR) 24 hr tablet 120 mg, 120 mg, Oral, Daily, Ben Carlisle MD, 120 mg at 10/17/19 1045  •  ketamine (KETALAR) 20 mg in sodium chloride 0.9 % 100 mL infusion, 20 mg, Intravenous, Daily PRN **AND** Ketamine Vital Signs & Assessment, , , Per Order Details, Ben Carlisle MD  •  ketotifen (ZADITOR) 0.025 % ophthalmic solution 1 drop, 1 drop, Both Eyes, BID, Ben Carlisle MD  •  levothyroxine (SYNTHROID, LEVOTHROID) tablet 112 mcg, 112 mcg, Oral, Daily, Ben Carlisle MD, 112 mcg at 10/17/19 1045  •  linagliptin (TRADJENTA) tablet 5 mg, 5 mg, Oral, Daily, Ben Carlisle MD, 5 mg at 10/17/19 1045  •  magnesium hydroxide (MILK OF MAGNESIA) suspension 2400 mg/10mL 10 mL, 10 mL, Oral, Daily PRN, Ben Carlisle MD  •  Magnesium Sulfate 2 gram Bolus, followed by 8 gram infusion (total Mg dose 10 grams)- Mg less than or equal to 1mg/dL, 2 g, Intravenous, PRN **OR** Magnesium Sulfate 2 gram / 50mL Infusion (GIVE X 3 BAGS TO EQUAL 6GM TOTAL DOSE) - Mg 1.1 - 1.5 mg/dl,  2 g, Intravenous, PRN **OR** Magnesium Sulfate 4 gram infusion- Mg 1.6-1.9 mg/dL, 4 g, Intravenous, PRN, Ben Carlisle MD  •  melatonin tablet 5 mg, 5 mg, Oral, Nightly PRN, Ben Carlisle MD  •  methadone (DOLOPHINE) tablet 10 mg, 10 mg, Oral, TID, Ben Carlisle MD, 10 mg at 10/17/19 1045  •  metoprolol tartrate (LOPRESSOR) tablet 25 mg, 25 mg, Oral, Q12H, Ben Carlisle MD, 25 mg at 10/17/19 1046  •  nicotine (NICODERM CQ) 21 MG/24HR patch 1 patch, 1 patch, Transdermal, Q24H, Ben Carlisle MD  •  nitroglycerin (NITROSTAT) SL tablet 0.4 mg, 0.4 mg, Sublingual, Q5 Min PRN, Ben Carlisle MD  •  ondansetron (ZOFRAN) injection 4 mg, 4 mg, Intravenous, Q6H PRN, Ben Carlisle MD  •  ondansetron (ZOFRAN) tablet 4 mg, 4 mg, Oral, Q6H PRN, Ben Carlisle MD  •  oxybutynin XL (DITROPAN-XL) 24 hr tablet 5 mg, 5 mg, Oral, Daily, Ben Carlisle MD, 5 mg at 10/17/19 1047  •  pantoprazole (PROTONIX) EC tablet 40 mg, 40 mg, Oral, Daily, Ben Carlisle MD, 40 mg at 10/17/19 1047  •  polyethylene glycol (MIRALAX) powder 17 g, 17 g, Oral, Daily, Ben Carlisle MD, 17 g at 10/17/19 1044  •  potassium chloride (K-DUR,KLOR-CON) CR tablet 40 mEq, 40 mEq, Oral, PRN, Ben Carlisle MD  •  potassium chloride (KLOR-CON) packet 40 mEq, 40 mEq, Oral, PRN, Ben Carlisle MD  •  sertraline (ZOLOFT) tablet 25 mg, 25 mg, Oral, Daily, Ben Carlisle MD, 25 mg at 10/17/19 1046  •  sodium chloride 0.9 % flush 10 mL, 10 mL, Intravenous, Q12H, Ben Carlisle MD  •  sodium chloride 0.9 % flush 10 mL, 10 mL, Intravenous, PRN, Ben Carlisle MD  •  vitamin C (ASCORBIC ACID) tablet 1,000 mg, 1,000 mg, Oral, Daily, Ben Carlisle MD, 1,000 mg at 10/17/19 1045  •  zinc oxide 20 % ointment 1 application, 1 application, Topical, BID, Ben Carlisle MD    Results Review:  Lab Results (last 48 hours)     Procedure Component Value Units Date/Time    Sedimentation Rate [[id number]]  (Abnormal) Collected:  10/17/19 0844    Specimen:  Blood  Updated:  10/17/19 1108     Sed Rate 69 mm/hr     CBC Auto Differential [[id number]]  (Abnormal) Collected:  10/17/19 0844    Specimen:  Blood Updated:  10/17/19 1011     WBC 7.00 10*3/mm3      RBC 3.16 10*6/mm3      Hemoglobin 8.6 g/dL      Hematocrit 28.6 %      MCV 90.4 fL      MCH 27.3 pg      MCHC 30.2 g/dL      RDW 21.8 %      RDW-SD 70.9 fl      MPV 9.2 fL      Platelets 110 10*3/mm3      Neutrophil % 71.4 %      Lymphocyte % 15.9 %      Monocyte % 7.4 %      Eosinophil % 4.3 %      Basophil % 1.0 %      Neutrophils, Absolute 5.00 10*3/mm3      Lymphocytes, Absolute 1.10 10*3/mm3      Monocytes, Absolute 0.50 10*3/mm3      Eosinophils, Absolute 0.30 10*3/mm3      Basophils, Absolute 0.10 10*3/mm3      nRBC 0.5 /100 WBC     CK [[id number]] Collected:  10/17/19 0843    Specimen:  Blood Updated:  10/17/19 1006    Comprehensive Metabolic Panel [[id number]] Collected:  10/17/19 0843    Specimen:  Blood Updated:  10/17/19 1006    Lipid Panel [[id number]] Collected:  10/17/19 0843    Specimen:  Blood Updated:  10/17/19 1006    Magnesium [[id number]] Collected:  10/17/19 0843    Specimen:  Blood Updated:  10/17/19 1006    Phosphorus [[id number]] Collected:  10/17/19 0843    Specimen:  Blood Updated:  10/17/19 1006    Procalcitonin [[id number]] Collected:  10/17/19 0843    Specimen:  Blood Updated:  10/17/19 1006    TSH [[id number]] Collected:  10/17/19 0843    Specimen:  Blood Updated:  10/17/19 1006    BNP [[id number]] Collected:  10/17/19 0843    Specimen:  Blood Updated:  10/17/19 1006    Ferritin [[id number]] Collected:  10/17/19 0843    Specimen:  Blood Updated:  10/17/19 1006    C-reactive Protein [[id number]] Collected:  10/17/19 0843    Specimen:  Blood Updated:  10/17/19 1006    Hemoglobin A1c [[id number]] Collected:  10/17/19 0844    Specimen:  Blood Updated:  10/17/19 1006    Vitamin B12 [[id number]] Collected:  10/17/19 0843    Specimen:  Blood Updated:  10/17/19 1006    Folate [[id number]] Collected:  10/17/19 0843     Specimen:  Blood Updated:  10/17/19 1006    Troponin [[id number]] Collected:  10/17/19 0843    Specimen:  Blood Updated:  10/17/19 1006    Uric Acid [[id number]] Collected:  10/17/19 0843    Specimen:  Blood Updated:  10/17/19 1006    Lactic Acid, Plasma [[id number]] Collected:  10/17/19 0844    Specimen:  Blood Updated:  10/17/19 1005    POC Glucose Once [[id number]]  (Normal) Collected:  10/17/19 0738    Specimen:  Blood Updated:  10/17/19 0742     Glucose 71 mg/dL      Comment: Serial Number: [id number]:  [id number]       Urinalysis With Culture If Indicated - Urine, Catheter In/Out [[id number]]  (Abnormal) Collected:  10/17/19 0228    Specimen:  Urine, Catheter In/Out Updated:  10/17/19 0304     Color, UA Yellow     Appearance, UA Cloudy     Comment: Result checked         pH, UA 5.5     Specific Gravity, UA 1.012     Glucose, UA Negative     Ketones, UA Negative     Bilirubin, UA Negative     Blood, UA Negative     Protein, UA >=300 mg/dL (3+)     Leuk Esterase, UA Negative     Nitrite, UA Negative     Urobilinogen, UA 0.2 E.U./dL    Urinalysis, Microscopic Only - Urine, Catheter In/Out [[id number]]  (Abnormal) Collected:  10/17/19 0228    Specimen:  Urine, Catheter In/Out Updated:  10/17/19 0304     RBC, UA 0-2 /HPF      WBC, UA 0-2 /HPF      Bacteria, UA None Seen /HPF      Squamous Epithelial Cells, UA 0-2 /HPF      Hyaline Casts, UA 0-2 /LPF      Methodology Manual Light Microscopy    Respiratory Panel, PCR - Swab, Nasopharynx [[id number]]  (Normal) Collected:  10/17/19 0025    Specimen:  Swab from Nasopharynx Updated:  10/17/19 0222     ADENOVIRUS, PCR Not Detected     Coronavirus 229E Not Detected     Coronavirus HKU1 Not Detected     Coronavirus NL63 Not Detected     Coronavirus OC43 Not Detected     Human Metapneumovirus Not Detected     Human Rhinovirus/Enterovirus Not Detected     Influenza B PCR Not Detected     Parainfluenza Virus 1 Not Detected     Parainfluenza Virus 2 Not Detected     Parainfluenza  Virus 3 Not Detected     Parainfluenza Virus 4 Not Detected     Bordetella pertussis pcr Not Detected     Influenza A H1 2009 PCR Not Detected     Chlamydophila pneumoniae PCR Not Detected     Mycoplasma pneumo by PCR Not Detected     Influenza A PCR Not Detected     Influenza A H3 Not Detected     Influenza A H1 Not Detected     RSV, PCR Not Detected    Extra Tubes [[id number]] Collected:  10/17/19 0019    Specimen:  Blood, Venous Line Updated:  10/17/19 0130    Narrative:       The following orders were created for panel order Extra Tubes.  Procedure                               Abnormality         Status                     ---------                               -----------         ------                     Gold Top - SST[[id number]]                                   Final result                 Please view results for these tests on the individual orders.    Gold Top - SST [[id number]] Collected:  10/17/19 0019    Specimen:  Blood Updated:  10/17/19 0130     Extra Tube Hold for add-ons.     Comment: Auto resulted.       Troponin [[id number]]  (Abnormal) Collected:  10/17/19 0019    Specimen:  Blood Updated:  10/17/19 0059     Troponin T 0.043 ng/mL     Narrative:       Troponin T Reference Range:  <= 0.03 ng/mL-   Negative for AMI  >0.03 ng/mL-     Abnormal for myocardial necrosis.  Clinicians would have to utilize clinical acumen, EKG, Troponin and serial changes to determine if it is an Acute Myocardial Infarction or myocardial injury due to an underlying chronic condition.     Comprehensive Metabolic Panel [[id number]]  (Abnormal) Collected:  10/17/19 0019    Specimen:  Blood Updated:  10/17/19 0051     Glucose 122 mg/dL      BUN 61 mg/dL      Creatinine 2.72 mg/dL      Sodium 141 mmol/L      Potassium 5.2 mmol/L      Chloride 103 mmol/L      CO2 29.0 mmol/L      Calcium 8.6 mg/dL      Total Protein 6.8 g/dL      Albumin 3.50 g/dL      ALT (SGPT) 9 U/L      AST (SGOT) 18 U/L      Alkaline Phosphatase 75 U/L       Total Bilirubin 0.3 mg/dL      eGFR Non African Amer 17 mL/min/1.73      Globulin 3.3 gm/dL      A/G Ratio 1.1 g/dL      BUN/Creatinine Ratio 22.4     Anion Gap 14.2 mmol/L     Narrative:       GFR Normal >60  Chronic Kidney Disease <60  Kidney Failure <15    BNP [[id number]]  (Abnormal) Collected:  10/17/19 0019    Specimen:  Blood Updated:  10/17/19 0050     proBNP 8,150.0 pg/mL     Narrative:       Among patients with dyspnea, NT-proBNP is highly sensitive for the detection of acute congestive heart failure. In addition NT-proBNP of <300 pg/ml effectively rules out acute congestive heart failure with 99% negative predictive value.    Protime-INR [[id number]]  (Normal) Collected:  10/17/19 0019    Specimen:  Blood Updated:  10/17/19 0033     Protime 10.3 Seconds      INR 0.98    aPTT [[id number]]  (Normal) Collected:  10/17/19 0019    Specimen:  Blood Updated:  10/17/19 0033     PTT 25.3 seconds     Blood Culture - Blood, Hand, Right [[id number]] Collected:  10/17/19 0023    Specimen:  Blood from Hand, Right Updated:  10/17/19 0028    CBC & Differential [[id number]] Collected:  10/17/19 0019    Specimen:  Blood Updated:  10/17/19 0026    Narrative:       The following orders were created for panel order CBC & Differential.  Procedure                               Abnormality         Status                     ---------                               -----------         ------                     CBC Auto Differential[[id number]]        Abnormal            Final result                 Please view results for these tests on the individual orders.    CBC Auto Differential [[id number]]  (Abnormal) Collected:  10/17/19 0019    Specimen:  Blood Updated:  10/17/19 0026     WBC 9.50 10*3/mm3      RBC 3.11 10*6/mm3      Hemoglobin 8.6 g/dL      Hematocrit 27.7 %      MCV 89.0 fL      MCH 27.5 pg      MCHC 30.9 g/dL      RDW 21.0 %      RDW-SD 66.5 fl      MPV 8.6 fL      Platelets 148 10*3/mm3      Neutrophil % 74.6 %       Lymphocyte % 13.7 %      Monocyte % 6.9 %      Eosinophil % 3.7 %      Basophil % 1.1 %      Neutrophils, Absolute 7.10 10*3/mm3      Lymphocytes, Absolute 1.30 10*3/mm3      Monocytes, Absolute 0.70 10*3/mm3      Eosinophils, Absolute 0.40 10*3/mm3      Basophils, Absolute 0.10 10*3/mm3      nRBC 0.3 /100 WBC     Blood Culture - Blood, Arm, Left [[id number]] Collected:  10/17/19 0019    Specimen:  Blood from Arm, Left Updated:  10/17/19 0023        Imaging Results (last 72 hours)     Procedure Component Value Units Date/Time    US Renal Bilateral [[id number]] Collected:  10/17/19 0945     Updated:  10/17/19 0949    Narrative:       Examination: US RENAL BILATERAL-     Date of Exam: 10/17/2019 9:08 AM     Indication: ARF/CRF; I50.9-Heart failure, unspecified; R79.89-Other  specified abnormal findings of blood chemistry.     Comparison: 3/22/2019.     Technique: Grayscale and color Doppler ultrasound evaluation of the  kidneys and urinary bladder was performed     Findings:  Study is mildly limited by the patient's body habitus. Bilateral renal  parenchymal atrophy with diffusely increased bilateral renal  echogenicity, likely reflecting medical renal disease. No solid renal  mass, shadowing stone, or hydronephrosis. The right kidney measures 9.8  cm in length. The left kidney measures 8.1 cm in length. Limited imaging  of the urinary bladder is unremarkable.       Impression:       Bilateral renal parenchymal atrophy and diffusely increased bilateral  renal echogenicity, likely reflecting medical renal disease.     Electronically Signed By-Natalio Chanel On:10/17/2019 9:46 AM  This report was finalized on [id number] by  Natalio Chanel, .    XR Chest 1 View [[id number]] Collected:  10/17/19 0715     Updated:  10/17/19 0719    Narrative:       DATE OF EXAM:  10/16/2019 11:55 PM     PROCEDURE:  XR CHEST 1 VW-     INDICATIONS:  soa     COMPARISON:  Radiograph 4/11/2019, 3/22/2019, and 6/1/2017.     TECHNIQUE:   Single  radiographic AP view of the chest was obtained.     FINDINGS:  The study is limited by lordotic patient positioning. Overlying  artifacts. Small to moderate bilateral pleural effusions with basilar  predominant opacification of both lungs. Indistinct pulmonary  vasculature. No pneumothorax. Stable cardiomegaly, likely accentuated by  technique. Calcified atherosclerotic disease in the thoracic aorta.  Partially visualized thoracic spondylosis.        Impression:       Limited study demonstrating stable cardiomegaly, suspected moderate  pulmonary edema, and small to moderate bilateral pleural effusions with  underlying bibasilar atelectasis and/or pneumonia.     Electronically Signed By-Natalio Chanel On:10/17/2019 7:17 AM  This report was finalized on [id number] by  Natalio Chanel, .          Review of Systems:  Review of Systems   Respiratory: Negative for cough and shortness of breath.    Cardiovascular: Positive for leg swelling. Negative for chest pain.   Genitourinary: Negative for dysuria.   Skin: Positive for rash and wound.       Physical Assessment:  Wound 10/17/19 Right medial perirectal Pressure Injury (Active)   Wound Image   10/17/2019  6:29 AM                     Stage II sacral pressure injury: The injury is quite low near the perirectal area.  The injury is partial-thickness it has a pink clean base with scant amount of exudate noted approximate size of the injury 0.8 x 0.6 cm.  Patient does have a red papular rash consistent with yeast to the perirectal is worse perineal and abdominal fold area.    Recommendation and Plan  Stage II sacral pressure injury, recommend treating with a 2% miconazole to the rash in a Elizabeth's Butt paste to the actual open area.  Implement prevention strategies such as use of a waffle chair cushion, keeping her heels off of the bed assisting with turning positions as needed.  Current surface is appropriate.    Nellie Forrester, VANNESSA   10/17/2019   11:10 AM

## 2019-10-17 NOTE — ED PROVIDER NOTES
Subjective   84-year-old female who complains of fatigue, moderate, associated with shortness of air is here from nursing home secondary to the symptoms as well as abnormal laboratory values including elevated troponin and BNP in the setting of chronic kidney disease.  Patient denies any chest pain.  Per family members, patient has been treated for UTI 2 weeks ago as well as transfusion for anemia last Friday with a blood count of 6.5.  Patient received 2 units of packed cells.  Patient did not have any blood in the stool at that time and has had intermittent anemia in the past.            Review of Systems   Constitutional: Positive for fatigue.   Respiratory: Positive for shortness of breath.    Cardiovascular: Positive for leg swelling.   All other systems reviewed and are negative.      Past Medical History:   Diagnosis Date   • Coronary artery disease    • Hyperlipidemia    • Hypertension        No Known Allergies    No past surgical history on file.    No family history on file.    Social History     Socioeconomic History   • Marital status:      Spouse name: Not on file   • Number of children: Not on file   • Years of education: Not on file   • Highest education level: Not on file   Tobacco Use   • Smoking status: Former Smoker   Substance and Sexual Activity   • Alcohol use: No     Frequency: Never           Objective   Physical Exam   Constitutional: She is oriented to person, place, and time. She appears well-developed and well-nourished.   HENT:   Head: Normocephalic and atraumatic.   Mouth/Throat: Oropharynx is clear and moist.   Eyes: EOM are normal. Pupils are equal, round, and reactive to light.   Mild right-sided conjunctival exudate, no injection   Neck: Normal range of motion. Neck supple.   Cardiovascular: Normal rate, regular rhythm, normal heart sounds and intact distal pulses.   Pulmonary/Chest: Effort normal.   Bibasilar rhonchi   Abdominal: Soft. Bowel sounds are normal. She exhibits  no distension. There is no tenderness.   Musculoskeletal:   Lymphedema bilateral lower extremities with venous stasis dermatitis, chronic   Neurological: She is alert and oriented to person, place, and time. No cranial nerve deficit.   Motor and sensation intact   Skin: Skin is warm and dry. Capillary refill takes less than 2 seconds.   Psychiatric: She has a normal mood and affect. Her behavior is normal.       Procedures           ED Course                  MDM  Number of Diagnoses or Management Options  Acute congestive heart failure, unspecified heart failure type (CMS/HCC):   Elevated troponin:   Diagnosis management comments: Results for orders placed or performed during the hospital encounter of 10/16/19  -Respiratory Panel, PCR - Swab, Nasopharynx       Result                      Value             Ref Range           ADENOVIRUS, PCR             Not Detected      Not Detected        Coronavirus 229E            Not Detected      Not Detected        Coronavirus HKU1            Not Detected      Not Detected        Coronavirus NL63            Not Detected      Not Detected        Coronavirus OC43            Not Detected      Not Detected        Human Metapneumovirus       Not Detected      Not Detected        Human Rhinovirus/Enter*     Not Detected      Not Detected        Influenza B PCR             Not Detected      Not Detected        Parainfluenza Virus 1       Not Detected      Not Detected        Parainfluenza Virus 2       Not Detected      Not Detected        Parainfluenza Virus 3       Not Detected      Not Detected        Parainfluenza Virus 4       Not Detected      Not Detected        Bordetella pertussis p*     Not Detected      Not Detected        Influenza A H1 2009 PCR     Not Detected      Not Detected        Chlamydophila pneumoni*     Not Detected      Not Detected        Mycoplasma pneumo by P*     Not Detected      Not Detected        Influenza A PCR             Not Detected      Not  Detected        Influenza A H3              Not Detected      Not Detected        Influenza A H1              Not Detected      Not Detected        RSV, PCR                    Not Detected      Not Detected   -Comprehensive Metabolic Panel       Result                      Value             Ref Range           Glucose                     122 (H)           65 - 99 mg/dL       BUN                         61 (H)            8 - 23 mg/dL        Creatinine                  2.72 (H)          0.57 - 1.00 *       Sodium                      141               136 - 145 mm*       Potassium                   5.2               3.5 - 5.2 mm*       Chloride                    103               98 - 107 mmo*       CO2                         29.0              22.0 - 29.0 *       Calcium                     8.6               8.6 - 10.5 m*       Total Protein               6.8               6.0 - 8.5 g/*       Albumin                     3.50              3.50 - 5.20 *       ALT (SGPT)                  9                 1 - 33 U/L          AST (SGOT)                  18                1 - 32 U/L          Alkaline Phosphatase        75                39 - 117 U/L        Total Bilirubin             0.3               0.2 - 1.2 mg*       eGFR Non  Amer       17 (L)            >60 mL/min/1*       Globulin                    3.3               gm/dL               A/G Ratio                   1.1               g/dL                BUN/Creatinine Ratio        22.4              7.0 - 25.0          Anion Gap                   14.2              5.0 - 15.0 m*  -Protime-INR       Result                      Value             Ref Range           Protime                     10.3              9.6 - 11.7 S*       INR                         0.98              0.90 - 1.10    -aPTT       Result                      Value             Ref Range           PTT                         25.3              24.0 - 31.0 *  -Urinalysis With Culture If Indicated  - Urine, Catheter In/Out       Result                      Value             Ref Range           Color, UA                   Yellow            Yellow, Straw       Appearance, UA              Cloudy (A)        Clear               pH, UA                      5.5               5.0 - 8.0           Specific [place], UA        1.012             1.005 - 1.030       Glucose, UA                 Negative          Negative            Ketones, UA                 Negative          Negative            Bilirubin, UA               Negative          Negative            Blood, UA                   Negative          Negative            Protein, UA                                   Negative        >=300 mg/dL (3+) (A)       Leuk Esterase, UA           Negative          Negative            Nitrite, UA                 Negative          Negative            Urobilinogen, UA            0.2 E.U./dL       0.2 - 1.0 E.*  -Troponin       Result                      Value             Ref Range           Troponin T                  0.043 (C)         0.000 - 0.03*  -BNP       Result                      Value             Ref Range           proBNP                      8,150.0 (H)       5.0-1,800.0 *  -CBC Auto Differential       Result                      Value             Ref Range           WBC                         9.50              3.40 - 10.80*       RBC                         3.11 (L)          3.77 - 5.28 *       Hemoglobin                  8.6 (L)           12.0 - 15.9 *       Hematocrit                  27.7 (L)          34.0 - 46.6 %       MCV                         89.0              79.0 - 97.0 *       MCH                         27.5              26.6 - 33.0 *       MCHC                        30.9 (L)          31.5 - 35.7 *       RDW                         21.0 (H)          12.3 - 15.4 %       RDW-SD                      66.5 (H)          37.0 - 54.0 *       MPV                         8.6               6.0 - 12.0 fL        Platelets                   148               140 - 450 10*       Neutrophil %                74.6              42.7 - 76.0 %       Lymphocyte %                13.7 (L)          19.6 - 45.3 %       Monocyte %                  6.9               5.0 - 12.0 %        Eosinophil %                3.7               0.3 - 6.2 %         Basophil %                  1.1               0.0 - 1.5 %         Neutrophils, Absolute       7.10 (H)          1.70 - 7.00 *       Lymphocytes, Absolute       1.30              0.70 - 3.10 *       Monocytes, Absolute         0.70              0.10 - 0.90 *       Eosinophils, Absolute       0.40              0.00 - 0.40 *       Basophils, Absolute         0.10              0.00 - 0.20 *       nRBC                        0.3 (H)           0.0 - 0.2 /1*  -Urinalysis, Microscopic Only - Urine, Catheter In/Out       Result                      Value             Ref Range           RBC, UA                     0-2 (A)           None Seen /H*       WBC, UA                     0-2 (A)           None Seen /H*       Bacteria, UA                None Seen         None Seen /H*       Squamous Epithelial Ce*     0-2               None Seen, 0*       Hyaline Casts, UA           0-2               None Seen /L*       Methodology                                                   Manual Light Microscopy  -Type & Screen       Result                      Value             Ref Range           ABO Type                    A                                     RH type                     Negative                              Antibody Screen             Negative                              T&S Expiration Date                                           10/20/2019 11:59:59 PM  -Gold Top - SST       Result                      Value             Ref Range           Extra Tube                                                    Hold for add-ons.    Well on reevaluation, vital signs stable, will gently diurese and cover with  aspirin given elevated troponin.  Treatment for possible pneumonia initiated per nursing home with Rocephin and doxycycline, will defer to hospitalist regarding continuation of this.  Favor CHF is primary etiology of patient's dyspnea and fatigue.       Amount and/or Complexity of Data Reviewed  Clinical lab tests: reviewed  Tests in the radiology section of CPT®: reviewed  Decide to obtain previous medical records or to obtain history from someone other than the patient: yes  Independent visualization of images, tracings, or specimens: yes        Final diagnoses:   Acute congestive heart failure, unspecified heart failure type (CMS/HCC)   Elevated troponin              Saul Carranza MD  10/17/19 0322

## 2019-10-17 NOTE — CONSULTS
NEPHROLOGY CONSULTATION-----[facility]    [facility]  [phone]  Ivanna Sesay MD    Patient Care Team:  Chris Remy MD as PCP - General (Geriatric Medicine)  Diana Sesay MD as PCP - Claims Attributed  Adry Bear MD as PCP - Family Medicine    CC/REASON FOR CONSULTATION: RENAL FAILURE/VOLUME OVERLOAD  PHYSICIAN REQUESTING CONSULTATION:     History of Present Illness     HPI    Patient is a 84 y.o. WF, very well known to me,  whom I was asked to see in consultation for evaluation and management of renal failure/elevated serum creatinine. Patient was admitted with SOB, edema, MONTILLA, PND.   Patient with known CRF/CKD STG 3. No NSAIDs or recent IV dye exposure. No known h/o hepatitis, TB, rheumatic fever, jaundice, SLE. Does bleed/bruise easily.  Some urinary hesitancy/dysuria. +Edema.  +Compliance with home meds. Was on diuretics in the form of Bumex  prior to admission.  No herbal med use.      Review of Systems   Constitutional: Positive for activity change and fatigue. Negative for appetite change, chills, diaphoresis, fever and unexpected weight change.   HENT: Positive for congestion. Negative for dental problem, drooling, ear discharge, ear pain, facial swelling, hearing loss, mouth sores, nosebleeds, postnasal drip, rhinorrhea, sinus pressure, sinus pain, sneezing, sore throat, tinnitus, trouble swallowing and voice change.    Eyes: Negative for photophobia, pain, discharge, redness, itching and visual disturbance.   Respiratory: Positive for shortness of breath. Negative for apnea, cough, choking, chest tightness, wheezing and stridor.    Cardiovascular: Positive for leg swelling. Negative for chest pain and palpitations.   Gastrointestinal: Negative for abdominal distention, abdominal pain, anal bleeding, blood in stool, constipation, diarrhea, nausea, rectal pain and vomiting.   Endocrine: Negative for cold  intolerance, heat intolerance, polydipsia, polyphagia and polyuria.   Genitourinary: Positive for difficulty urinating and frequency. Negative for decreased urine volume, dysuria, enuresis, flank pain, genital sores, hematuria and urgency.   Musculoskeletal: Positive for arthralgias. Negative for back pain, gait problem, joint swelling, myalgias, neck pain and neck stiffness.   Skin: Negative for color change, pallor, rash and wound.   Allergic/Immunologic: Negative for environmental allergies, food allergies and immunocompromised state.   Neurological: Positive for weakness. Negative for dizziness, tremors, seizures, syncope, facial asymmetry, speech difficulty, light-headedness, numbness and headaches.   Hematological: Negative for adenopathy. Bruises/bleeds easily.   Psychiatric/Behavioral: Negative for agitation, behavioral problems, confusion, decreased concentration, dysphoric mood, hallucinations, self-injury, sleep disturbance and suicidal ideas. The patient is not nervous/anxious and is not hyperactive.           Past Medical History:   Diagnosis Date   • CHF (congestive heart failure) (CMS/[facility])    • Coronary artery disease    • Diabetes mellitus (CMS/[facility])    • Disease of thyroid gland    • Elevated cholesterol    • GERD (gastroesophageal reflux disease)    • History of transfusion    • Hyperlipidemia    • Hypertension    • PONV (postoperative nausea and vomiting)        Past Surgical History:   Procedure Laterality Date   • ABDOMINAL SURGERY     • BACK SURGERY     • CARDIAC CATHETERIZATION     • COLONOSCOPY     • EYE SURGERY     • HYSTERECTOMY         No family history on file.    Social History     Tobacco Use   • Smoking status: Former Smoker   Substance Use Topics   • Alcohol use: No     Frequency: Never   • Drug use: No       Home Meds:   Medications Prior to Admission   Medication Sig Dispense Refill Last Dose   • acetaminophen (TYLENOL) 325 MG tablet Take 325 mg by mouth Every 6 (Six) Hours As Needed  for Mild Pain .      • ammonium lactate (AMLACTIN) 12 % cream Apply 1 application topically to the appropriate area as directed Daily.      • aspirin 81 MG chewable tablet Chew 81 mg Daily.      • atorvastatin (LIPITOR) 10 MG tablet Take 10 mg by mouth Daily.      • bisacodyl (DULCOLAX) 5 MG EC tablet Take 5 mg by mouth Every Other Day.      • cefTRIAXone (ROCEPHIN) 1 g injection Inject 1 g into the appropriate muscle as directed by prescriber Daily.      • docusate sodium (COLACE) 100 MG capsule Take 100 mg by mouth 2 (Two) Times a Day.      • doxycycline (VIBRAMYCIN) 100 MG capsule Take 100 mg by mouth 2 (Two) Times a Day.      • Insulin Glargine (BASAGLAR KWIKPEN) 100 UNIT/ML injection pen Inject 30 Units under the skin into the appropriate area as directed 2 (Two) Times a Day.      • magnesium hydroxide (MILK OF MAGNESIA) 2400 MG/10ML suspension suspension Take 30 mL by mouth Daily As Needed.      • methadone (DOLOPHINE) 10 MG tablet Take 10 mg by mouth 3 (Three) Times a Day,      • pantoprazole (PROTONIX) 20 MG EC tablet Take 20 mg by mouth Daily.      • polyethylene glycol (MIRALAX) packet Take 17 g by mouth Daily.      • vitamin C (ASCORBIC ACID) 500 MG tablet Take 1,000 mg by mouth Daily.      • vitamin D (ERGOCALCIFEROL) 67919 units capsule capsule Take 50,000 Units by mouth 1 (One) Time Per Week.      • zinc oxide 20 % ointment Apply 1 application topically to the appropriate area as directed 2 (Two) Times a Day.      • bumetanide (BUMEX) 2 MG tablet Take 2 mg by mouth 2 (Two) Times a Day.   Taking   • busPIRone (BUSPAR) 7.5 MG tablet Every 8 (Eight) Hours.   Taking   • febuxostat (ULORIC) 40 MG tablet Take 40 mg by mouth Daily.   Taking   • ferrous sulfate 325 (65 FE) MG tablet Take 325 mg by mouth Daily.   Taking   • gabapentin (NEURONTIN) 300 MG capsule Take 300 mg by mouth 2 (Two) Times a Day.   Taking   • hydrALAZINE (APRESOLINE) 25 MG tablet Take 25 mg by mouth 3 (Three) Times a Day.   Taking   •  isosorbide mononitrate (IMDUR) 60 MG 24 hr tablet Take 120 mg by mouth Daily.   Patient Taking Differently   • ketotifen (ZADITOR) 0.025 % ophthalmic solution Administer 1 drop to both eyes 2 (Two) Times a Day.   Taking   • levothyroxine (SYNTHROID, LEVOTHROID) 112 MCG tablet Take 112 mcg by mouth Daily.   Taking   • linagliptin (TRADJENTA) 5 MG tablet tablet Take 5 mg by mouth Daily.   Taking   • Melatonin 3 MG capsule Take 3 mg by mouth Daily.   Taking   • metoprolol tartrate (LOPRESSOR) 25 MG tablet Take 25 mg by mouth 2 (Two) Times a Day.   Taking   • Mirabegron ER (MYRBETRIQ) 25 MG tablet sustained-release 24 hour 24 hr tablet Take 25 mg by mouth Daily.   Taking   • ondansetron (ZOFRAN) 4 MG tablet Take 4 mg by mouth Every 6 (Six) Hours As Needed for Nausea or Vomiting.   Taking   • sertraline (ZOLOFT) 25 MG tablet Take 25 mg by mouth Daily.   Taking       Scheduled Meds:    ammonium lactate 1 application Topical Daily   [START ON 10/18/2019] aspirin 81 mg Oral Daily   atorvastatin 10 mg Oral Daily   bisacodyl 5 mg Oral Every Other Day   bumetanide 2 mg Oral BID   cefTRIAXone 1 g Intramuscular Daily   cholecalciferol 1,000 Units Oral Daily   docusate sodium 100 mg Oral BID   doxycycline 100 mg Oral Q12H   febuxostat 40 mg Oral Daily   ferrous sulfate 324 mg Oral Daily With Breakfast   gabapentin 300 mg Oral Nightly   hydrALAZINE 25 mg Oral TID   insulin glargine 30 Units Subcutaneous Nightly   insulin lispro 0-9 Units Subcutaneous 4x Daily With Meals & Nightly   isosorbide mononitrate 120 mg Oral Daily   ketotifen 1 drop Both Eyes BID   levothyroxine 112 mcg Oral Daily   linagliptin 5 mg Oral Daily   methadone 10 mg Oral TID   metoprolol tartrate 25 mg Oral Q12H   nicotine 1 patch Transdermal Q24H   oxybutynin XL 5 mg Oral Daily   pantoprazole 40 mg Oral Daily   polyethylene glycol 17 g Oral Daily   sertraline 25 mg Oral Daily   sodium chloride 10 mL Intravenous Q12H   vitamin C 1,000 mg Oral Daily   zinc oxide  1 application Topical BID       Continuous Infusions:    sodium chloride 100 mL/hr       PRN Meds:  •  acetaminophen **OR** acetaminophen **OR** acetaminophen  •  Calcium Gluconate-NaCl **AND** calcium gluconate **AND** Calcium  •  dextrose  •  dextrose  •  docusate sodium  •  glucagon (human recombinant)  •  ketamine (KETALAR) infusion **AND** Ketamine Vital Signs & Assessment  •  magnesium hydroxide  •  magnesium sulfate **OR** magnesium sulfate **OR** magnesium sulfate  •  melatonin  •  nitroglycerin  •  ondansetron  •  ondansetron  •  potassium chloride  •  potassium chloride  •  sodium chloride    Allergies:  Patient has no known allergies.    OBJECTIVE    Vital Signs  Temp:  [97.4 °F (36.3 °C)-98.7 °F (37.1 °C)] 97.4 °F (36.3 °C)  Heart Rate:  [51-61] 55  Resp:  [16-18] 16  BP: (114-157)/(31-67) 157/67    No intake/output data recorded.  No intake/output data recorded.    Physical Exam:  General Appearance: alert, appears stated age and cooperative  Head: normocephalic, without obvious abnormality and atraumatic  Eyes: conjunctivae and sclerae normal and no icterus  Neck: supple +JVD  Lungs: +FINE BIBASILAR CRACKLES  Heart: regular rhythm & normal rate and normal S1, S2 +GARRISON  Chest Wall: no abnormalities observed  Abdomen: normal bowel sounds and soft non-tender +OBESITY  Extremities: moves extremities well, 1+ BILAT PRETIBIAL EDEMA, no cyanosis and no redness  Skin: no bleeding, bruising or rash  Neurologic: Alert, and oriented. No focal deficits    Results Review:    I reviewed the patient's new clinical results.    WBC WBC   Date Value Ref Range Status   10/17/2019 9.50 3.40 - 10.80 10*3/mm3 Final      HGB Hemoglobin   Date Value Ref Range Status   10/17/2019 8.6 (L) 12.0 - 15.9 g/dL Final      HCT Hematocrit   Date Value Ref Range Status   10/17/2019 27.7 (L) 34.0 - 46.6 % Final      Platlets No results found for: LABPLAT   MCV MCV   Date Value Ref Range Status   10/17/2019 89.0 79.0 - 97.0 fL Final           Sodium Sodium   Date Value Ref Range Status   10/17/2019 141 136 - 145 mmol/L Final      Potassium Potassium   Date Value Ref Range Status   10/17/2019 5.2 3.5 - 5.2 mmol/L Final      Chloride Chloride   Date Value Ref Range Status   10/17/2019 103 98 - 107 mmol/L Final      CO2 CO2   Date Value Ref Range Status   10/17/2019 29.0 22.0 - 29.0 mmol/L Final      BUN BUN   Date Value Ref Range Status   10/17/2019 61 (H) 8 - 23 mg/dL Final      Creatinine Creatinine   Date Value Ref Range Status   10/17/2019 2.72 (H) 0.57 - 1.00 mg/dL Final      Calcium Calcium   Date Value Ref Range Status   10/17/2019 8.6 8.6 - 10.5 mg/dL Final      PO4 No results found for: CAPO4   Albumin Albumin   Date Value Ref Range Status   10/17/2019 3.50 3.50 - 5.20 g/dL Final      Magnesium No results found for: MG   Uric Acid No results found for: URICACID       Imaging Results (last 72 hours)     Procedure Component Value Units Date/Time    XR Chest 1 View [[id number]] Collected:  10/17/19 0715     Updated:  10/17/19 0719    Narrative:       DATE OF EXAM:  10/16/2019 11:55 PM     PROCEDURE:  XR CHEST 1 VW-     INDICATIONS:  soa     COMPARISON:  Radiograph 4/11/2019, 3/22/2019, and 6/1/2017.     TECHNIQUE:   Single radiographic AP view of the chest was obtained.     FINDINGS:  The study is limited by lordotic patient positioning. Overlying  artifacts. Small to moderate bilateral pleural effusions with basilar  predominant opacification of both lungs. Indistinct pulmonary  vasculature. No pneumothorax. Stable cardiomegaly, likely accentuated by  technique. Calcified atherosclerotic disease in the thoracic aorta.  Partially visualized thoracic spondylosis.        Impression:       Limited study demonstrating stable cardiomegaly, suspected moderate  pulmonary edema, and small to moderate bilateral pleural effusions with  underlying bibasilar atelectasis and/or pneumonia.     Electronically Signed By-Natalio Chanel On:10/17/2019 7:17 AM  This  report was finalized on [id number] by  Natalio Chanel, .            Results for orders placed during the hospital encounter of 10/16/19   XR Chest 1 View    Narrative DATE OF EXAM:  10/16/2019 11:55 PM     PROCEDURE:  XR CHEST 1 VW-     INDICATIONS:  soa     COMPARISON:  Radiograph 2019, 3/22/2019, and 2017.     TECHNIQUE:   Single radiographic AP view of the chest was obtained.     FINDINGS:  The study is limited by lordotic patient positioning. Overlying  artifacts. Small to moderate bilateral pleural effusions with basilar  predominant opacification of both lungs. Indistinct pulmonary  vasculature. No pneumothorax. Stable cardiomegaly, likely accentuated by  technique. Calcified atherosclerotic disease in the thoracic aorta.  Partially visualized thoracic spondylosis.        Impression Limited study demonstrating stable cardiomegaly, suspected moderate  pulmonary edema, and small to moderate bilateral pleural effusions with  underlying bibasilar atelectasis and/or pneumonia.     Electronically Signed By-Natalio Chanel On:10/17/2019 7:17 AM  This report was finalized on [id number] by  Natalio Chanel, .         Results for orders placed during the hospital encounter of 19   Duplex Venous Lower Extremity - Bilateral CAR    Narrative                   Lower Extremity Venous Report                          [facility]                         Vascular Laboratory                            [address] [place] [address]    Name: NIGEL QUINTANILLA              Study Date: 2019 01:46 PM  MRN: [id number]                       Patient Location:   : 1935 (M/[birth year]/yyyy)           Gender: Female  Age: 83 yrs                          Account#: [id number]  Reason For Study: edema      Procedure  Venous study was carried out in bilateral lower extremities. Gray scale, color  flow, and spectral doppler images were obtained. Images were obtained in  transverse and  longitudinal views.    Right Lower Extremity Venous  On the right side the patient has patent common femoral, femoral, and  popliteal veins. The main veins are easily compressible. Femoral vein was  mapped in its entirety through the course of the thigh. It is patent and  compresses well. The popliteal vein is patent along with its tributaries and  compresses well with no evidence of any filling defect. Augmentation test is  positive. Respiratory waves are biphasic. Distal veins are patent. The right  greater and small saphenous veins are patent with good compressibility. There  is fluid retention noted, suggestive of edema. There is a cystic fluid  retention behind the right knee which could be a Baker's cyst.    Left Lower Extremity Venous  On the left side the patient has patent common femoral, femoral, and popliteal  veins. The main veins compress well. Femoral vein was mapped in its entirety  through the course of the thigh. It is patent and compresses well. The  popliteal vein is patent along with its tributaries and compresses well with  no evidence of any filling defect. Augmentation test is positive. Respiratory  waves are biphasic. Distal veins are patent. The left greater and small  saphenous veins are patent with good compressibility. There is fluid retention  noted, suggestive of edema. There is a lymph node noted in the left groin.      Interpretation Summary  No evidence of any deep vein thrombosis or superficial vein thrombosis.  _______________________________________________________________________________    Electronically signed by: Alejandro Escobar MD  on 04/12/2019 04:36 PM    Ordering Physician: ELAINE STUBBS  Referring Physician: ANA LILIA MEJÍA  Performed By: RH         ASSESSMENT / PLAN      Acute congestive heart failure (CMS/HCC)    1. RENAL FAILURE-------Nonoliguric. +ARF/PAUL on top of known CRF/CKD STG 3, with a baseline serum creatinine of 2.1. CRF/CKD STG 3 secondary to  DGS/HTN NS. +ARF/PAUL secondary to decompensated CHF/CP state. Diurese. Avoid hypotension. Check urine and serum studies and renal US. No NSAIDs or IV dye. Follow for dialysis need    2. 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 Check TSH. Follow    3. HTN WITH CKD STG 3------BP okay. Avoid hypotension. No ACE/ARB/DRI    4. ANEMIA OF CKD------Check Fe and hemoccult. Follow for PRBC and/or EPO/iron need    5. DMII WITH RENAL MANIFESTATIONS-------Glucometers, SSI. BS okay    6. OA/DJD------No NSAIDs. Check uric acid level    7. GERD------ON PPI. Aware or risks with regards to CKD    8. HYPERLIPIDEMIA------On Statin. Check CK, TSH    9. VITAMIN D DEFICIENCY------On supplementation.        I discussed the patients findings and my recommendations with patient and nursing staff    Will follow along closely. Thank you for allowing us to see this patient in renal consultation.    [facility]  [phone]  MD Ivanna Anderson MD  10/17/19  8:26 AM

## 2019-10-17 NOTE — H&P
[facility] Medicine Services            Primary Care Provider:  Chris Remy MD    Patient Care Team:  Chris Remy MD as PCP - General (Geriatric Medicine)  Diana Sesay MD as PCP - Claims Attributed  Adry Bear MD as PCP - Family Medicine    CHIEF COMPLAINT:     Chief Complaint   Patient presents with   • Abnormal Lab       HISTORY OF PRESENT ILLNESS:    HPI  Patient is a 84-year-old female was sent from the nursing home as she had some labs done including BNP and troponin and they came out to be abnormally high and patient was sent to the ER for further evaluation.  I have seen the patient today in the ER and also spoke to the ER physician and I have reviewed the records.  Patient has chronic kidney disease and does see a nephrologist and they have been titrating her medications.  Patient also recently had cough congestion generalized weakness and patient was started on Rocephin and doxycycline at the nursing home.  Patient at this time denies any complaints whatsoever except just feeling fatigued and tired.  She denies any fever/chills/nausea/vomiting/diarrhea or any other symptoms.    Social history is negative    Family history is negative    Past Medical History:   Diagnosis Date   • Coronary artery disease    • Hyperlipidemia    • Hypertension        No past surgical history on file.    No family history on file.    Social History     Tobacco Use   • Smoking status: Former Smoker   Substance Use Topics   • Alcohol use: No     Frequency: Never   • Drug use: Not on file         (Not in a hospital admission)    Allergies:  Patient has no known allergies.      There is no immunization history on file for this patient.        REVIEW OF SYSTEMS:     Review of Systems   All other systems reviewed and are negative.      Vital Signs  Temp:  [98.7 °F (37.1 °C)] 98.7 °F (37.1 °C)  Heart Rate:  [58-61] 58  Resp:  [16-18] 18  BP: (132-151)/(31-65)  "147/48    Flowsheet Rows      First Filed Value   Admission Height  152.4 cm (60\") Documented at 10/16/2019 2312   Admission Weight  101 kg (222 lb) Documented at 10/16/2019 2312           Physical Exam:    Physical Exam   Constitutional: She appears well-developed and well-nourished. No distress.   HENT:   Head: Normocephalic and atraumatic.   Nose: Nose normal.   Mouth/Throat: Oropharynx is clear and moist. No oropharyngeal exudate.   Eyes: Conjunctivae and EOM are normal. Pupils are equal, round, and reactive to light. Right eye exhibits no discharge. Left eye exhibits no discharge. No scleral icterus.   Neck: Normal range of motion. No JVD present. No tracheal deviation present. No thyromegaly present.   Cardiovascular: Normal rate, regular rhythm, normal heart sounds and intact distal pulses. Exam reveals no gallop and no friction rub.   No murmur heard.  Pulmonary/Chest: Effort normal and breath sounds normal. No stridor. No respiratory distress. She has no wheezes. She has no rales. She exhibits no tenderness.   Abdominal: Soft. Bowel sounds are normal. She exhibits no distension and no mass. There is no tenderness. There is no rebound and no guarding. No hernia.   Musculoskeletal: Normal range of motion. She exhibits no edema, tenderness or deformity.   Lymphadenopathy:     She has no cervical adenopathy.   Neurological: She is alert. No cranial nerve deficit or sensory deficit. She exhibits normal muscle tone. Coordination normal.   Skin: Skin is warm and dry. No rash noted. She is not diaphoretic. No erythema.   Psychiatric: She has a normal mood and affect. Her behavior is normal.   Nursing note and vitals reviewed.      Emotional Behavior:   Normal   Debilities:  Age appropriate      Results Review:      I reviewed the patient's new clinical results.    Lab Results (most recent)     Procedure Component Value Units Date/Time    Urinalysis With Culture If Indicated - Urine, Catheter In/Out [[id number]]  " (Abnormal) Collected:  10/17/19 0228    Specimen:  Urine, Catheter In/Out Updated:  10/17/19 0304     Color, UA Yellow     Appearance, UA Cloudy     Comment: Result checked         pH, UA 5.5     Specific Gravity, UA 1.012     Glucose, UA Negative     Ketones, UA Negative     Bilirubin, UA Negative     Blood, UA Negative     Protein, UA >=300 mg/dL (3+)     Leuk Esterase, UA Negative     Nitrite, UA Negative     Urobilinogen, UA 0.2 E.U./dL    Urinalysis, Microscopic Only - Urine, Catheter In/Out [[id number]]  (Abnormal) Collected:  10/17/19 0228    Specimen:  Urine, Catheter In/Out Updated:  10/17/19 0304     RBC, UA 0-2 /HPF      WBC, UA 0-2 /HPF      Bacteria, UA None Seen /HPF      Squamous Epithelial Cells, UA 0-2 /HPF      Hyaline Casts, UA 0-2 /LPF      Methodology Manual Light Microscopy    Respiratory Panel, PCR - Swab, Nasopharynx [[id number]]  (Normal) Collected:  10/17/19 0025    Specimen:  Swab from Nasopharynx Updated:  10/17/19 0222     ADENOVIRUS, PCR Not Detected     Coronavirus 229E Not Detected     Coronavirus HKU1 Not Detected     Coronavirus NL63 Not Detected     Coronavirus OC43 Not Detected     Human Metapneumovirus Not Detected     Human Rhinovirus/Enterovirus Not Detected     Influenza B PCR Not Detected     Parainfluenza Virus 1 Not Detected     Parainfluenza Virus 2 Not Detected     Parainfluenza Virus 3 Not Detected     Parainfluenza Virus 4 Not Detected     Bordetella pertussis pcr Not Detected     Influenza A H1 2009 PCR Not Detected     Chlamydophila pneumoniae PCR Not Detected     Mycoplasma pneumo by PCR Not Detected     Influenza A PCR Not Detected     Influenza A H3 Not Detected     Influenza A H1 Not Detected     RSV, PCR Not Detected    Extra Tubes [[id number]] Collected:  10/17/19 0019    Specimen:  Blood, Venous Line Updated:  10/17/19 0130    Narrative:       The following orders were created for panel order Extra Tubes.  Procedure                               Abnormality          Status                     ---------                               -----------         ------                     Gold Top - SST[[id number]]                                   Final result                 Please view results for these tests on the individual orders.    Gold Top - SST [[id number]] Collected:  10/17/19 0019    Specimen:  Blood Updated:  10/17/19 0130     Extra Tube Hold for add-ons.     Comment: Auto resulted.       Troponin [[id number]]  (Abnormal) Collected:  10/17/19 0019    Specimen:  Blood Updated:  10/17/19 0059     Troponin T 0.043 ng/mL     Narrative:       Troponin T Reference Range:  <= 0.03 ng/mL-   Negative for AMI  >0.03 ng/mL-     Abnormal for myocardial necrosis.  Clinicians would have to utilize clinical acumen, EKG, Troponin and serial changes to determine if it is an Acute Myocardial Infarction or myocardial injury due to an underlying chronic condition.     Comprehensive Metabolic Panel [[id number]]  (Abnormal) Collected:  10/17/19 0019    Specimen:  Blood Updated:  10/17/19 0051     Glucose 122 mg/dL      BUN 61 mg/dL      Creatinine 2.72 mg/dL      Sodium 141 mmol/L      Potassium 5.2 mmol/L      Chloride 103 mmol/L      CO2 29.0 mmol/L      Calcium 8.6 mg/dL      Total Protein 6.8 g/dL      Albumin 3.50 g/dL      ALT (SGPT) 9 U/L      AST (SGOT) 18 U/L      Alkaline Phosphatase 75 U/L      Total Bilirubin 0.3 mg/dL      eGFR Non African Amer 17 mL/min/1.73      Globulin 3.3 gm/dL      A/G Ratio 1.1 g/dL      BUN/Creatinine Ratio 22.4     Anion Gap 14.2 mmol/L     Narrative:       GFR Normal >60  Chronic Kidney Disease <60  Kidney Failure <15    BNP [[id number]]  (Abnormal) Collected:  10/17/19 0019    Specimen:  Blood Updated:  10/17/19 0050     proBNP 8,150.0 pg/mL     Narrative:       Among patients with dyspnea, NT-proBNP is highly sensitive for the detection of acute congestive heart failure. In addition NT-proBNP of <300 pg/ml effectively rules out acute congestive heart failure  with 99% negative predictive value.    Protime-INR [[id number]]  (Normal) Collected:  10/17/19 0019    Specimen:  Blood Updated:  10/17/19 0033     Protime 10.3 Seconds      INR 0.98    aPTT [[id number]]  (Normal) Collected:  10/17/19 0019    Specimen:  Blood Updated:  10/17/19 0033     PTT 25.3 seconds     Blood Culture - Blood, Hand, Right [[id number]] Collected:  10/17/19 0023    Specimen:  Blood from Hand, Right Updated:  10/17/19 0028    CBC & Differential [[id number]] Collected:  10/17/19 0019    Specimen:  Blood Updated:  10/17/19 0026    Narrative:       The following orders were created for panel order CBC & Differential.  Procedure                               Abnormality         Status                     ---------                               -----------         ------                     CBC Auto Differential[[id number]]        Abnormal            Final result                 Please view results for these tests on the individual orders.    CBC Auto Differential [[id number]]  (Abnormal) Collected:  10/17/19 0019    Specimen:  Blood Updated:  10/17/19 0026     WBC 9.50 10*3/mm3      RBC 3.11 10*6/mm3      Hemoglobin 8.6 g/dL      Hematocrit 27.7 %      MCV 89.0 fL      MCH 27.5 pg      MCHC 30.9 g/dL      RDW 21.0 %      RDW-SD 66.5 fl      MPV 8.6 fL      Platelets 148 10*3/mm3      Neutrophil % 74.6 %      Lymphocyte % 13.7 %      Monocyte % 6.9 %      Eosinophil % 3.7 %      Basophil % 1.1 %      Neutrophils, Absolute 7.10 10*3/mm3      Lymphocytes, Absolute 1.30 10*3/mm3      Monocytes, Absolute 0.70 10*3/mm3      Eosinophils, Absolute 0.40 10*3/mm3      Basophils, Absolute 0.10 10*3/mm3      nRBC 0.3 /100 WBC     Blood Culture - Blood, Arm, Left [[id number]] Collected:  10/17/19 0019    Specimen:  Blood from Arm, Left Updated:  10/17/19 0023          Imaging Results (most recent)     Procedure Component Value Units Date/Time    XR Chest 1 View [[id number]] Resulted:  10/17/19 0300     Updated:  10/17/19  0301        reviewed    ECG/EMG Results (most recent)     None        reviewed    Results for orders placed during the hospital encounter of 19   Duplex Venous Lower Extremity - Bilateral CAR    Narrative                   Lower Extremity Venous Report                          [facility]                         Vascular Laboratory                            [address] [place] [address]    Name: NIGEL QUINTANILLA              Study Date: 2019 01:46 PM  MRN: [id number]                       Patient Location: OU  : 1935 (M/d/yyyy)           Gender: Female  Age: 83 yrs                          Account#: [id number]  Reason For Study: edema      Procedure  Venous study was carried out in bilateral lower extremities. Gray scale, color  flow, and spectral doppler images were obtained. Images were obtained in  transverse and longitudinal views.    Right Lower Extremity Venous  On the right side the patient has patent common femoral, femoral, and  popliteal veins. The main veins are easily compressible. Femoral vein was  mapped in its entirety through the course of the thigh. It is patent and  compresses well. The popliteal vein is patent along with its tributaries and  compresses well with no evidence of any filling defect. Augmentation test is  positive. Respiratory waves are biphasic. Distal veins are patent. The right  greater and small saphenous veins are patent with good compressibility. There  is fluid retention noted, suggestive of edema. There is a cystic fluid  retention behind the right knee which could be a Baker's cyst.    Left Lower Extremity Venous  On the left side the patient has patent common femoral, femoral, and popliteal  veins. The main veins compress well. Femoral vein was mapped in its entirety  through the course of the thigh. It is patent and compresses well. The  popliteal vein is patent along with its tributaries and compresses well  with  no evidence of any filling defect. Augmentation test is positive. Respiratory  waves are biphasic. Distal veins are patent. The left greater and small  saphenous veins are patent with good compressibility. There is fluid retention  noted, suggestive of edema. There is a lymph node noted in the left groin.      Interpretation Summary  No evidence of any deep vein thrombosis or superficial vein thrombosis.  _______________________________________________________________________________    Electronically signed by: Alejandro Escobar MD  on 2019 04:36 PM    Ordering Physician: ELAINE STUBBS  Referring Physician: ANA LILIA MEJÍA  Performed By:          Results for orders placed during the hospital encounter of 19   Adult Transthoracic Echo Complete W/ Cont if Necessary Per Protocol    Narrative                           Adult Echocardiogram Report          [facility]  Cardiology Department  [address] [place] [address]      Name: NIGEL QUINTANILLA MStudy Date: 2019 02:13 PM         BP: 135/75 mmHg  MRN: [id number]         Patient Location:   : 1935        Gender: Female                          Height: 60 in  Age: 83 yrs            Account#: [id number]                   Weight: 183 lb  Reason For Study: SOA PUL HTN                                  BSA: 1.8 m2  Ordering Physician:  ELAINE STUBBS  Referring Physician:  ANA LILIA MEJÍA  Performed By: SYLVESTER      M-Mode/2-D Measurements:  LVIDd: 4.6 cm       (3.7-5.7) LVPWd: 1.1 cm        (0.8-1.2)  LVIDs: 3.4 cm       (2.3-3.9)  ACS: 1.6 cm         (1.6-3.7) IVSd: 1.1 cm         (0.7-1.2)  LA dimension: 4.9 cm(1.9-4.0) RVDd: 2.6 cm         (0.7-2.4)  FS: 25.8 %          (21-40%)  Ao root diam: 2.3 cm (2.0-3.7)    Comments  Technically adequate study.  Mitral valve is thickened with mild mitral regurgitation.  Tricuspid valve is normal with mild tricuspid regurgitation.  Calculated right ventricle  systolic pressure is about 25 mm of mercury  consistent with mild pulmonary hypertension.  Aortic valve is sclerosed with adequate opening motion.  Left atrium is moderately dilated. Aortic root is normal in size. Right  ventricle is mildly dilated.  LV size and contractility appears normal. EF is about 55%.  No pericardial effusion noted.      Interpretation    MMode/2D Measurements & Calculations  ESV(Teich): 49.1 ml                     % IVS thick: 42.5 %  EF(Teich): 50.8 %                       % LVPW thick: 43.3 %                                          LVOT diam: 1.8 cm  Ao root area: 4.1 cm2  EDV(MOD-sp4): 85.4 ml  ESV(MOD-sp4): 38.5 ml  EF(MOD-sp4): 54.9 %    Doppler Measurements & Calculations  MV E max aba: 112.5 cm/sec                MV max P.9 mmHg  MV A max aba: 95.5 cm/sec                 MV mean P.6 mmHg  MV E/A: 1.2                                            Ao V2 max: 139.6 cm/sec  MV dec slope: 618.6 cm/sec2               Ao max P.8 mmHg  MV dec time: 0.18 sec                     Ao V2 mean: 97.4 cm/sec                                            Ao mean P.2 mmHg                                            Ao V2 VTI: 28.6 cm                                              PURA(I,D): 2.4 cm2                                            PURA(V,D): 2.2 cm2  LV V1 max P.1 mmHg                    PA max PG: 3.6 mmHg  LV V1 mean PG: 3.4 mmHg  LV V1 max: 123.8 cm/sec  LV V1 mean: 89.1 cm/sec  LV V1 VTI: 27.1 cm  TR max aba: 264.9 cm/sec  TR max P.4 mmHg  RVSP(TR): 38.4 mmHg      _______________________________________________________________________________    Electronically signed by: Yuval Choudhary MD  on 2019 07:10  PM         XR Chest 1 View  DATE OF SERVICE:  2017 12:26 PM    PROCEDURE:  XR CHEST PORTABLE AT BEDSIDE (ROUTINE)    HISTORY:  Fever.    COMPARISON:  10/08/2016.    TECHNIQUE:  A radiologic portable AP view of the chest was  obtained.    DISCUSSION:  The lungs are hypoinflated.  There is no focal opacity.  No pneumothorax or  pleural effusion.  Cardiac size and pulmonary vasculature appear within normal  limits.    IMPRESSION:  Hypoinflated lungs without superimposed acute process of the chest.    Alexandro Hernandes MD    DS: 06/01/2017 12:47  Report ID: [id number]  cc:  HAMILTON LEI    FINAL AUTHENTICATED COPY  CT Head Without Contrast  DATE OF SERVICE:  6/1/2017 12:14 PM    PROCEDURE:  CT HEAD WITHOUT    HISTORY:  confusion    COMPARISON:  None.    TECHNIQUE:  Routine transaxial cuts were obtained through the head without the  administration of contrast.    DISCUSSION:  There is no acute intracranial hemorrhage.  No mass effect or midline shift.  The ventricles and cortical sulci demonstrate mild prominence consistent with  age related atrophy.  There are scattered areas of periventricular and  subcortical white matter hypoattenuation which are nonspecific but suggestive  of changes of chronic small vessel ischemic disease.  The posterior fossa  appears without acute abnormality.  The visualized paranasal sinuses are well  aerated.  The mastoid air cells are well aerated.  The calvarium is intact.    IMPRESSION:    1. No acute intracranial hemorrhage or acute intracranial abnormality.  2. Generalized cerebral volume loss and white matter findings which are  nonspecific but suggestive of changes of chronic small vessel ischemic disease.    Vasile Ludwig MD    DS: 06/01/2017 12:26  Report ID: [id number]  cc:  HAMILTON LEI    FINAL AUTHENTICATED COPY      Active Hospital Problems    Diagnosis  POA   • Acute congestive heart failure (CMS/HCC) [I50.9]  Yes      Resolved Hospital Problems   No resolved problems to display.         Assessment/Plan       Acute congestive heart failure (CMS/HCC)    #1 cough congestion shortness of breath with abnormal chest x-ray possible pneumonia and we will continue Rocephin and doxycycline as those are started at nursing  home.    #2 chronic diastolic CHF with EF 55% about few months ago on echocardiogram as well as patient has high proBNP and we will continue current home medications and at this time patient does not seem to be in acute CHF exacerbation.  I will do CT scan chest to further evaluate the shadow we are seeing on the right lungs.  -Patient is on Bumex as well as beta-blocker    #3 chronic kidney disease stage IV and patient will be seen by nephrology for further management    #4 anemia of chronic kidney disease versus possible GI bleed and patient has recently been transfused and will get GI consult to see if patient need upper or lower endoscopy.    #5 bladder incontinence patient be continued on home medications    #6 diabetes mellitus type 2 patient is on linagliptin/Lantus insulin and will check Accu-Cheks and use sliding scale insulin    #7 hypertension patient is on Lopressor as well as hydralazine and will continue it    #8 diabetic neuropathy and will continue gabapentin    #9 chronic gout patient is on Uloric continue    #10 GERD patient is on PPI and will continue it    #11 chronic methadone therapy, not sure why she is on it but will continue at this time since it was being given at the nursing home.    #12 hyperlipidemia and patient is on statin therapy and will continue it    #13 DVT prophylaxis and I will hold off on using Lovenox since patient has low hemoglobin and will be she is having GI bleed and has required blood transfusion recently.    #14 hypothyroidism continue levothyroxine    #15 depression patient is on Zoloft will continue it          Disposition    Back to nursing home once cleared by nephrology and GI    I discussed the patients findings and my recommendations with patient.     Ben Carlisle MD  10/17/19  4:14 AM

## 2019-10-17 NOTE — PROGRESS NOTES
Discharge Planning Assessment  [facility]     Patient Name: Amanda Combs  MRN: [id number]  Today's Date: 10/17/2019    Admit Date: 10/16/2019    Discharge Needs Assessment     Row Name 10/17/19 1522       Living Environment    Lives With  facility resident    Name(s) of Who Lives With Patient  RESIDENT AT [facility]    Current Living Arrangements  extended care facility    Duration at Residence  3 YRS    Primary Care Provided by  other (see comments) STAFF AT FACILITY    Quality of Family Relationships  supportive    Able to Return to Prior Arrangements  yes PER DAUGHTER PATIENT IS TO RETURN       Resource/Environmental Concerns    Resource/Environmental Concerns  none       Transition Planning    Patient/Family Anticipates Transition to  inpatient rehabilitation facility    Patient/Family Anticipated Services at Transition         Discharge Needs Assessment    Equipment Currently Used at Home  oxygen;walker, standard        Discharge Plan     Row Name 10/17/19 1524       Plan    Plan  CAN RETURN TO [facility]    Patient/Family in Agreement with Plan  yes        Destination - Selection Complete      Service Provider Request Status Selected Services Address Phone Number Fax Number    [facility] [address] [place] [id number] [phone]       Jody PASCUAL Vazquez 10/17/2019 0931    Ok to return to facility LTC per Rosetta,no PASRR or precert needed                  Functional Status     Row Name 10/17/19 1521       Functional Status    Usual Activity Tolerance  poor    Current Activity Tolerance  poor       Functional Status, IADL    Medications  completely dependent    Meal Preparation  completely dependent    Housekeeping  completely dependent    Laundry  completely dependent    Shopping  completely dependent    IADL Comments  STAFF AT [facility]       Mental Status    General Appearance WDL  WDL           Patient Forms     Row Name 10/17/19 1520       Patient Forms    Important Message from Medicare (IMM)  Delivered    Delivered to  Support person DAUGHTER SIGNED IM LETTER    Method of delivery  In person            Carol naegele rn  Case management  Office number [phone]  Cell phone [phone]

## 2019-10-18 PROBLEM — I50.9 ACUTE CONGESTIVE HEART FAILURE (HCC): Status: RESOLVED | Noted: 2019-01-01 | Resolved: 2019-01-01

## 2019-10-18 NOTE — PLAN OF CARE
Problem: Fall Risk (Adult)  Goal: Identify Related Risk Factors and Signs and Symptoms  Outcome: Ongoing (interventions implemented as appropriate)   10/18/19 0350   Fall Risk (Adult)   Related Risk Factors (Fall Risk) age-related changes;confusion/agitation;fatigue/slow reaction;gait/mobility problems;environment unfamiliar     Goal: Absence of Fall  Outcome: Ongoing (interventions implemented as appropriate)   10/18/19 0350   Fall Risk (Adult)   Absence of Fall making progress toward outcome       Problem: Patient Care Overview  Goal: Plan of Care Review  Outcome: Ongoing (interventions implemented as appropriate)   10/18/19 0350   Coping/Psychosocial   Plan of Care Reviewed With patient   OTHER   Outcome Summary pt resting in recliner. pt continues to be diruesed with large incontinent voids. no c/o pain discomfort. Will continue to monitor.        Problem: Cardiac: Heart Failure (Adult)  Goal: Signs and Symptoms of Listed Potential Problems Will be Absent, Minimized or Managed (Cardiac: Heart Failure)  Outcome: Ongoing (interventions implemented as appropriate)   10/18/19 0350   Goal/Outcome Evaluation   Problems Assessed (Heart Failure) all   Problems Present (Heart Failure) fluid/electrolyte imbalance       Problem: Skin Injury Risk (Adult)  Goal: Identify Related Risk Factors and Signs and Symptoms  Outcome: Ongoing (interventions implemented as appropriate)   10/18/19 0350   Skin Injury Risk (Adult)   Related Risk Factors (Skin Injury Risk) advanced age;cognitive impairment;mobility impaired;moisture     Goal: Skin Health and Integrity  Outcome: Ongoing (interventions implemented as appropriate)   10/18/19 0350   Skin Injury Risk (Adult)   Skin Health and Integrity making progress toward outcome       Problem: Wound (Includes Pressure Injury) (Adult)  Goal: Signs and Symptoms of Listed Potential Problems Will be Absent, Minimized or Managed (Wound)  Outcome: Ongoing (interventions implemented as  appropriate)   10/18/19 4365   Goal/Outcome Evaluation   Problems Assessed (Wound) all   Problems Present (Wound) delayed wound healing

## 2019-10-18 NOTE — DISCHARGE SUMMARY
Date of Admission: 10/16/2019    Date of Discharge:  10/18/2019    Length of stay:  LOS: 1 day     Discharge Diagnosis:  #1 cough congestion shortness of breath with abnormal chest x-ray possible pneumonia      #2 chronic diastolic CHF with EF 55%      #3 chronic kidney disease stage IV      #4 anemia of chronic kidney disease versus possible GI bleed      #5 bladder incontinence      #6 diabetes mellitus type 2     #7 hypertension      #8 diabetic neuropathy      #9 chronic gout      #10 GERD      #11 chronic methadone therapy- Chronic back pain      #12 hyperlipidemia     #13 hypothyroidism      #14 depression               Presenting Problem/History of Present Illness  Active Hospital Problems    Diagnosis  POA   • Pressure injury of sacral region, stage 2 (CMS/[facility]) [L89.152]  Yes      Resolved Hospital Problems    Diagnosis Date Resolved POA   • Acute congestive heart failure (CMS/[facility]) [I50.9] 10/18/2019 Yes          Hospital Course  HPI  Patient is a 84-year-old female was sent from the nursing home as she had some labs done including BNP and troponin and they came out to be abnormally high and patient was sent to the ER for further evaluation.  I have seen the patient today in the ER and also spoke to the ER physician and I have reviewed the records.  Patient has chronic kidney disease and does see a nephrologist and they have been titrating her medications.  Patient also recently had cough congestion generalized weakness and patient was started on Rocephin and doxycycline at the nursing home.  Patient at this time denies any complaints whatsoever except just feeling fatigued and tired.  She denies any fever/chills/nausea/vomiting/diarrhea or any other symptoms.    Nephrology was consulted for renal failure.  Patient was diuresed.  Renal ultrasound was ordered which showed  Bilateral renal parenchymal atrophy and diffusely increased bilateral  renal echogenicity, likely reflecting medical renal disease.  GI  was consulted for possible GI bleed.  Patient was started on PPI and MiraLAX daily and was determined to hold off on endoscopic evaluation because a less active GI bleed is noted.  Patient is stable for discharge. Patient is to continue entire course of antibiotics of Rocephin and doxycycline.  Patient to take medications as prescribed.      Past Medical History:     Past Medical History:   Diagnosis Date   • CHF (congestive heart failure) (CMS/[facility])    • Coronary artery disease    • Diabetes mellitus (CMS/[facility])    • Disease of thyroid gland    • Elevated cholesterol    • GERD (gastroesophageal reflux disease)    • History of transfusion    • Hyperlipidemia    • Hypertension    • PONV (postoperative nausea and vomiting)        Past Surgical History:     Past Surgical History:   Procedure Laterality Date   • ABDOMINAL SURGERY     • BACK SURGERY     • CARDIAC CATHETERIZATION     • COLONOSCOPY     • EYE SURGERY     • HYSTERECTOMY         Social History:   Social History     Socioeconomic History   • Marital status:      Spouse name: Not on file   • Number of children: Not on file   • Years of education: Not on file   • Highest education level: Not on file   Tobacco Use   • Smoking status: Former Smoker   Substance and Sexual Activity   • Alcohol use: No     Frequency: Never   • Drug use: No   • Sexual activity: Defer         Procedures Performed none          Consults:   Consults     Date and Time Order Name Status Description    10/17/2019 0414 Inpatient Nephrology Consult Completed     10/17/2019 0414 Inpatient Gastroenterology Consult Completed     10/17/2019 0316 Inpatient Hospitalist Consult Completed           Pertinent Test Results:     Microbiology Results (last 10 days)     Procedure Component Value - Date/Time    Respiratory Panel, PCR - Swab, Nasopharynx [[id number]]  (Normal) Collected:  10/17/19 0025    Lab Status:  Final result Specimen:  Swab from Nasopharynx Updated:  10/17/19 0222     ADENOVIRUS,  PCR Not Detected     Coronavirus 229E Not Detected     Coronavirus HKU1 Not Detected     Coronavirus NL63 Not Detected     Coronavirus OC43 Not Detected     Human Metapneumovirus Not Detected     Human Rhinovirus/Enterovirus Not Detected     Influenza B PCR Not Detected     Parainfluenza Virus 1 Not Detected     Parainfluenza Virus 2 Not Detected     Parainfluenza Virus 3 Not Detected     Parainfluenza Virus 4 Not Detected     Bordetella pertussis pcr Not Detected     Influenza A H1 2009 PCR Not Detected     Chlamydophila pneumoniae PCR Not Detected     Mycoplasma pneumo by PCR Not Detected     Influenza A PCR Not Detected     Influenza A H3 Not Detected     Influenza A H1 Not Detected     RSV, PCR Not Detected    Blood Culture - Blood, Hand, Right [[id number]] Collected:  10/17/19 0023    Lab Status:  Preliminary result Specimen:  Blood from Hand, Right Updated:  10/18/19 0030     Blood Culture No growth at 24 hours    Blood Culture - Blood, Arm, Left [[id number]] Collected:  10/17/19 0019    Lab Status:  Preliminary result Specimen:  Blood from Arm, Left Updated:  10/18/19 0030     Blood Culture No growth at 24 hours               Blood Culture   Date Value Ref Range Status   10/17/2019 No growth at 24 hours  Preliminary   10/17/2019 No growth at 24 hours  Preliminary          Results for orders placed during the hospital encounter of 19   Adult Transthoracic Echo Complete W/ Cont if Necessary Per Protocol    Narrative                           Adult Echocardiogram Report          [facility]  Cardiology Department  [address] [place] [address]      Name: NIGEL QUINTANILLA MStudy Date: 2019 02:13 PM         BP: 135/75 mmHg  MRN: [id number]         Patient Location: OU  : 1935        Gender: Female                          Height: 60 in  Age: 83 yrs            Account#: [id number]                   Weight: 183 lb  Reason For Study: SOA PUL HTN                                   BSA: 1.8 m2  Ordering Physician:  ELAINE STUBBS  Referring Physician:  ANA LILIA MEJÍA  Performed By: SYLVESTER      M-Mode/2-D Measurements:  LVIDd: 4.6 cm       (3.7-5.7) LVPWd: 1.1 cm        (0.8-1.2)  LVIDs: 3.4 cm       (2.3-3.9)  ACS: 1.6 cm         (1.6-3.7) IVSd: 1.1 cm         (0.7-1.2)  LA dimension: 4.9 cm(1.9-4.0) RVDd: 2.6 cm         (0.7-2.4)  FS: 25.8 %          (21-40%)  Ao root diam: 2.3 cm (2.0-3.7)    Comments  Technically adequate study.  Mitral valve is thickened with mild mitral regurgitation.  Tricuspid valve is normal with mild tricuspid regurgitation.  Calculated right ventricle systolic pressure is about 25 mm of mercury  consistent with mild pulmonary hypertension.  Aortic valve is sclerosed with adequate opening motion.  Left atrium is moderately dilated. Aortic root is normal in size. Right  ventricle is mildly dilated.  LV size and contractility appears normal. EF is about 55%.  No pericardial effusion noted.      Interpretation    MMode/2D Measurements & Calculations  ESV(Teich): 49.1 ml                     % IVS thick: 42.5 %  EF(Teich): 50.8 %                       % LVPW thick: 43.3 %                                          LVOT diam: 1.8 cm  Ao root area: 4.1 cm2  EDV(MOD-sp4): 85.4 ml  ESV(MOD-sp4): 38.5 ml  EF(MOD-sp4): 54.9 %    Doppler Measurements & Calculations  MV E max aba: 112.5 cm/sec                MV max P.9 mmHg  MV A max aba: 95.5 cm/sec                 MV mean P.6 mmHg  MV E/A: 1.2                                            Ao V2 max: 139.6 cm/sec  MV dec slope: 618.6 cm/sec2               Ao max P.8 mmHg  MV dec time: 0.18 sec                     Ao V2 mean: 97.4 cm/sec                                            Ao mean P.2 mmHg                                            Ao V2 VTI: 28.6 cm                                              PURA(I,D): 2.4 cm2                                            PURA(V,D): 2.2 cm2  LV V1 max P.1 mmHg                     PA max PG: 3.6 mmHg  LV V1 mean PG: 3.4 mmHg  LV V1 max: 123.8 cm/sec  LV V1 mean: 89.1 cm/sec  LV V1 VTI: 27.1 cm  TR max aba: 264.9 cm/sec  TR max P.4 mmHg  RVSP(TR): 38.4 mmHg      _______________________________________________________________________________    Electronically signed by: Yuval Choudhary MD  on 2019 07:10  PM         Imaging Results (all)     Procedure Component Value Units Date/Time    US Renal Bilateral [[id number]] Collected:  10/17/19 0945     Updated:  10/17/19 0949    Narrative:       Examination: US RENAL BILATERAL-     Date of Exam: 10/17/2019 9:08 AM     Indication: ARF/CRF; I50.9-Heart failure, unspecified; R79.89-Other  specified abnormal findings of blood chemistry.     Comparison: 3/22/2019.     Technique: Grayscale and color Doppler ultrasound evaluation of the  kidneys and urinary bladder was performed     Findings:  Study is mildly limited by the patient's body habitus. Bilateral renal  parenchymal atrophy with diffusely increased bilateral renal  echogenicity, likely reflecting medical renal disease. No solid renal  mass, shadowing stone, or hydronephrosis. The right kidney measures 9.8  cm in length. The left kidney measures 8.1 cm in length. Limited imaging  of the urinary bladder is unremarkable.       Impression:       Bilateral renal parenchymal atrophy and diffusely increased bilateral  renal echogenicity, likely reflecting medical renal disease.     Electronically Signed By-Natalio Chanel On:10/17/2019 9:46 AM  This report was finalized on [id number] by  Natalio Chanel, .    XR Chest 1 View [[id number]] Collected:  10/17/19 0715     Updated:  10/17/19 0719    Narrative:       DATE OF EXAM:  10/16/2019 11:55 PM     PROCEDURE:  XR CHEST 1 VW-     INDICATIONS:  soa     COMPARISON:  Radiograph 2019, 3/22/2019, and 2017.     TECHNIQUE:   Single radiographic AP view of the chest was obtained.     FINDINGS:  The study is limited by  lordotic patient positioning. Overlying  artifacts. Small to moderate bilateral pleural effusions with basilar  predominant opacification of both lungs. Indistinct pulmonary  vasculature. No pneumothorax. Stable cardiomegaly, likely accentuated by  technique. Calcified atherosclerotic disease in the thoracic aorta.  Partially visualized thoracic spondylosis.        Impression:       Limited study demonstrating stable cardiomegaly, suspected moderate  pulmonary edema, and small to moderate bilateral pleural effusions with  underlying bibasilar atelectasis and/or pneumonia.     Electronically Signed By-Natalio Chanel On:10/17/2019 7:17 AM  This report was finalized on [id number] by  Natalio Chanel, .          No orders to display       Condition on Discharge:  stable    Vital Signs  Temp:  [97.4 °F (36.3 °C)-98 °F (36.7 °C)] 98 °F (36.7 °C)  Heart Rate:  [52-75] 71  Resp:  [17-18] 17  BP: (143-179)/(62-76) 179/72    Physical Exam:  Physical Exam   Constitutional: She is well-developed, well-nourished, and in no distress.   HENT:   Head: Normocephalic and atraumatic.   Eyes: Conjunctivae and EOM are normal. Pupils are equal, round, and reactive to light.   Neck: Normal range of motion.   Cardiovascular: Normal rate, regular rhythm and normal heart sounds.   S1, S2 audible    Pulmonary/Chest: Effort normal and breath sounds normal.   On 3L NC    Abdominal: Soft. Bowel sounds are normal.   Musculoskeletal: Normal range of motion.   Neurological: She is alert.   Oriented X1    Skin: Skin is warm and dry.   Psychiatric: Mood, memory, affect and judgment normal.   Vitals reviewed.      Discharge Disposition  Skilled Nursing Facility (DC - External)    Discharge Medications     Discharge Medications      New Medications      Instructions Start Date   cefdinir 300 MG capsule  Commonly known as:  OMNICEF   300 mg, Oral, 2 Times Daily         Changes to Medications      Instructions Start Date   pantoprazole 40 MG EC  tablet  Commonly known as:  PROTONIX  What changed:    · medication strength  · how much to take   40 mg, Oral, Daily   Start Date:  10/19/2019        Continue These Medications      Instructions Start Date   acetaminophen 325 MG tablet  Commonly known as:  TYLENOL   325 mg, Oral, Every 6 Hours PRN      ammonium lactate 12 % cream  Commonly known as:  AMLACTIN   1 application, Topical, Daily      aspirin 81 MG chewable tablet   81 mg, Oral, Daily      atorvastatin 10 MG tablet  Commonly known as:  LIPITOR   10 mg, Oral, Daily      bisacodyl 5 MG EC tablet  Commonly known as:  DULCOLAX   5 mg, Oral, Every Other Day      bumetanide 2 MG tablet  Commonly known as:  BUMEX   2 mg, Oral, 2 Times Daily      busPIRone 7.5 MG tablet  Commonly known as:  BUSPAR   Every 8 Hours      docusate sodium 100 MG capsule  Commonly known as:  COLACE   100 mg, Oral, 2 Times Daily      doxycycline 100 MG capsule  Commonly known as:  VIBRAMYCIN   100 mg, Oral, 2 Times Daily      ferrous sulfate 325 (65 FE) MG tablet   325 mg, Oral, Daily      gabapentin 300 MG capsule  Commonly known as:  NEURONTIN   300 mg, Oral, 2 Times Daily      hydrALAZINE 25 MG tablet  Commonly known as:  APRESOLINE   25 mg, Oral, 3 Times Daily      Insulin Glargine 100 UNIT/ML injection pen  Commonly known as:  BASAGLAR KWIKPEN   30 Units, Subcutaneous, 2 Times Daily      isosorbide mononitrate 60 MG 24 hr tablet  Commonly known as:  IMDUR   120 mg, Oral, Daily      ketotifen 0.025 % ophthalmic solution  Commonly known as:  ZADITOR   1 drop, Both Eyes, 2 Times Daily      levothyroxine 112 MCG tablet  Commonly known as:  SYNTHROID, LEVOTHROID   112 mcg, Oral, Daily      linagliptin 5 MG tablet tablet  Commonly known as:  TRADJENTA   5 mg, Oral, Daily      magnesium hydroxide 2400 MG/10ML suspension suspension  Commonly known as:  MILK OF MAGNESIA   30 mL, Oral, Daily PRN      Melatonin 3 MG capsule   3 mg, Oral, Daily      methadone 10 MG tablet  Commonly known as:   DOLOPHINE   10 mg, Oral, 3 Times Daily      metoprolol tartrate 25 MG tablet  Commonly known as:  LOPRESSOR   25 mg, Oral, 2 Times Daily      MYRBETRIQ 25 MG tablet sustained-release 24 hour 24 hr tablet  Generic drug:  Mirabegron ER   25 mg, Oral, Daily      ondansetron 4 MG tablet  Commonly known as:  ZOFRAN   4 mg, Oral, Every 6 Hours PRN      polyethylene glycol packet  Commonly known as:  MIRALAX   17 g, Oral, Daily      ULORIC 40 MG tablet  Generic drug:  febuxostat   40 mg, Oral, Daily      vitamin C 500 MG tablet  Commonly known as:  ASCORBIC ACID   1,000 mg, Oral, Daily      vitamin D 21326 units capsule capsule  Commonly known as:  ERGOCALCIFEROL   50,000 Units, Oral, Weekly      zinc oxide 20 % ointment   1 application, Topical, 2 Times Daily      ZOLOFT 25 MG tablet  Generic drug:  sertraline   25 mg, Oral, Daily         Stop These Medications    cefTRIAXone 1 g injection  Commonly known as:  ROCEPHIN            Discharge Diet:   Diet Instructions     Diet: Regular      Discharge Diet:  Regular          Activity at Discharge:   Activity Instructions     Activity as Tolerated            Follow-up Appointments  Future Appointments   Date Time Provider Department Center   7/27/2020  2:20 PM Joel Zelaya MD [facility]     Additional Instructions for the Follow-ups that You Need to Schedule     Call MD With Problems / Concerns   As directed      Instructions: Call [phone] or email [email or web address] for problems or concerns.    Order Comments:  Instructions: Call [phone] or email [email or web address] for problems or concerns.          Discharge Follow-up with PCP   As directed       Currently Documented PCP:    Chris Remy MD    PCP Phone Number:    [phone]     Follow Up Details:  1 week         Discharge Follow-up with Specified Provider: Gastroenterology; 2 Weeks   As directed      To:  Gastroenterology    Follow Up:  2 Weeks         Discharge Follow-up  with Specified Provider: Nephrology; 2 Weeks   As directed      To:  Nephrology    Follow Up:  2 Weeks               Test Results Pending at Discharge   Order Current Status    Blood Culture - Blood, Arm, Left Preliminary result    Blood Culture - Blood, Hand, Right Preliminary result           Risk for Readmission (LACE) Score: 8 (10/18/2019  6:00 AM)

## 2019-10-18 NOTE — PROGRESS NOTES
"NEPHROLOGY PROGRESS NOTE------[facility]    [facility]  [phone]  Pio Avila MD      Patient Care Team:  Chris Remy MD as PCP - General (Geriatric Medicine)  Diana Sesay MD as PCP - Claims Attributed  Adry Bear MD as PCP - Family Medicine      Provider:  Pio Avila MD  Patient Name: Amanda Combs  :  1935    SUBJECTIVE:  F/u PAUL/CKD3  Still SOA    Medication:    ammonium lactate 1 application Topical Daily   aspirin 81 mg Oral Daily   atorvastatin 10 mg Oral Daily   bisacodyl 5 mg Oral Every Other Day   ceftriaxone 1 g Intravenous Q24H   cholecalciferol 1,000 Units Oral Daily   docusate sodium 100 mg Oral BID   doxycycline 100 mg Oral Q12H   febuxostat 40 mg Oral Daily   ferrous sulfate 324 mg Oral Daily With Breakfast   furosemide 40 mg Intravenous Q8H   gabapentin 300 mg Oral Nightly   hydrALAZINE 25 mg Oral TID   insulin glargine 30 Units Subcutaneous Nightly   insulin lispro 0-9 Units Subcutaneous 4x Daily With Meals & Nightly   isosorbide mononitrate 120 mg Oral Daily   ketotifen 1 drop Both Eyes BID   levothyroxine 112 mcg Oral Daily   linagliptin 5 mg Oral Daily   methadone 10 mg Oral TID   metoprolol tartrate 25 mg Oral Q12H   miconazole  Topical Q12H   nicotine 1 patch Transdermal Q24H   oxybutynin XL 5 mg Oral Daily   pantoprazole 40 mg Oral Daily   polyethylene glycol 17 g Oral Daily   sertraline 25 mg Oral Daily   sodium chloride 10 mL Intravenous Q12H   vitamin C 1,000 mg Oral Daily   zinc oxide 1 application Topical BID          OBJECTIVE    Vital Sign Min/Max for last 24 hours  Temp  Min: 97.4 °F (36.3 °C)  Max: 98 °F (36.7 °C)   BP  Min: 150/62  Max: 179/72   Pulse  Min: 52  Max: 75   Resp  Min: 17  Max: 18   SpO2  Min: 92 %  Max: 99 %   No Data Recorded   No Data Recorded     Flowsheet Rows      First Filed Value   Admission Height  152.4 cm (60\") Documented at 10/16/2019 2312   Admission Weight  " 101 kg (222 lb) Documented at 10/16/2019 2312          I/O this shift:  In: 520 [P.O.:520]  Out: -   I/O last 3 completed shifts:  In: -   Out: 3 [Urine:3]    Physical Exam:  General Appearance: alert, appears stated age and cooperative  Head: normocephalic, without obvious abnormality and atraumatic  Eyes: conjunctivae and sclerae normal and no icterus  Neck: supple and no JVD  Lungs: clear to auscultation and respirations regular  Heart: regular rhythm & normal rate and normal S1, S2  Chest: Wall no abnormalities observed  Abdomen: normal bowel sounds and soft non-tender  Extremities: moves extremities well, 1+ edema, no cyanosis and no redness  Skin: no bleeding, bruising or rash, turgor normal, color normal and no leasions noted  Neurologic: Alert, and oriented. No focal deficits    Labs:    WBC WBC   Date Value Ref Range Status   10/18/2019 9.80 3.40 - 10.80 10*3/mm3 Final   10/17/2019 7.00 3.40 - 10.80 10*3/mm3 Final   10/17/2019 9.50 3.40 - 10.80 10*3/mm3 Final      HGB Hemoglobin   Date Value Ref Range Status   10/18/2019 8.8 (L) 12.0 - 15.9 g/dL Final   10/17/2019 8.6 (L) 12.0 - 15.9 g/dL Final   10/17/2019 8.6 (L) 12.0 - 15.9 g/dL Final      HCT Hematocrit   Date Value Ref Range Status   10/18/2019 28.2 (L) 34.0 - 46.6 % Final   10/17/2019 28.6 (L) 34.0 - 46.6 % Final   10/17/2019 27.7 (L) 34.0 - 46.6 % Final      Platlets No results found for: LABPLAT   MCV MCV   Date Value Ref Range Status   10/18/2019 89.3 79.0 - 97.0 fL Final   10/17/2019 90.4 79.0 - 97.0 fL Final   10/17/2019 89.0 79.0 - 97.0 fL Final          Sodium Sodium   Date Value Ref Range Status   10/18/2019 142 136 - 145 mmol/L Final   10/17/2019 141 136 - 145 mmol/L Final   10/17/2019 141 136 - 145 mmol/L Final      Potassium Potassium   Date Value Ref Range Status   10/18/2019 5.1 3.5 - 5.2 mmol/L Final   10/17/2019 4.9 3.5 - 5.2 mmol/L Final   10/17/2019 5.2 3.5 - 5.2 mmol/L Final      Chloride Chloride   Date Value Ref Range Status    10/18/2019 103 98 - 107 mmol/L Final   10/17/2019 104 98 - 107 mmol/L Final   10/17/2019 103 98 - 107 mmol/L Final      CO2 CO2   Date Value Ref Range Status   10/18/2019 29.0 22.0 - 29.0 mmol/L Final   10/17/2019 26.0 22.0 - 29.0 mmol/L Final   10/17/2019 29.0 22.0 - 29.0 mmol/L Final      BUN BUN   Date Value Ref Range Status   10/18/2019 62 (H) 8 - 23 mg/dL Final   10/17/2019 62 (H) 8 - 23 mg/dL Final   10/17/2019 61 (H) 8 - 23 mg/dL Final      Creatinine Creatinine   Date Value Ref Range Status   10/18/2019 2.76 (H) 0.57 - 1.00 mg/dL Final   10/17/2019 2.66 (H) 0.57 - 1.00 mg/dL Final   10/17/2019 2.72 (H) 0.57 - 1.00 mg/dL Final      Calcium Calcium   Date Value Ref Range Status   10/18/2019 8.5 (L) 8.6 - 10.5 mg/dL Final   10/17/2019 8.4 (L) 8.6 - 10.5 mg/dL Final   10/17/2019 8.6 8.6 - 10.5 mg/dL Final      PO4 No components found for: PO4   Albumin Albumin   Date Value Ref Range Status   10/18/2019 3.40 (L) 3.50 - 5.20 g/dL Final   10/17/2019 3.30 (L) 3.50 - 5.20 g/dL Final   10/17/2019 3.50 3.50 - 5.20 g/dL Final      Magnesium Magnesium   Date Value Ref Range Status   10/18/2019 2.1 1.6 - 2.4 mg/dL Final   10/17/2019 2.2 1.6 - 2.4 mg/dL Final      Uric Acid No components found for: URIC ACID     Imaging Results (last 72 hours)     Procedure Component Value Units Date/Time    US Renal Bilateral [[id number]] Collected:  10/17/19 0945     Updated:  10/17/19 0949    Narrative:       Examination: US RENAL BILATERAL-     Date of Exam: 10/17/2019 9:08 AM     Indication: ARF/CRF; I50.9-Heart failure, unspecified; R79.89-Other  specified abnormal findings of blood chemistry.     Comparison: 3/22/2019.     Technique: Grayscale and color Doppler ultrasound evaluation of the  kidneys and urinary bladder was performed     Findings:  Study is mildly limited by the patient's body habitus. Bilateral renal  parenchymal atrophy with diffusely increased bilateral renal  echogenicity, likely reflecting medical renal disease.  No solid renal  mass, shadowing stone, or hydronephrosis. The right kidney measures 9.8  cm in length. The left kidney measures 8.1 cm in length. Limited imaging  of the urinary bladder is unremarkable.       Impression:       Bilateral renal parenchymal atrophy and diffusely increased bilateral  renal echogenicity, likely reflecting medical renal disease.     Electronically Signed ByDelphine Chanel On:10/17/2019 9:46 AM  This report was finalized on [id number] by  Natalio Chanel, .    XR Chest 1 View [[id number]] Collected:  10/17/19 0715     Updated:  10/17/19 0719    Narrative:       DATE OF EXAM:  10/16/2019 11:55 PM     PROCEDURE:  XR CHEST 1 VW-     INDICATIONS:  soa     COMPARISON:  Radiograph 4/11/2019, 3/22/2019, and 6/1/2017.     TECHNIQUE:   Single radiographic AP view of the chest was obtained.     FINDINGS:  The study is limited by lordotic patient positioning. Overlying  artifacts. Small to moderate bilateral pleural effusions with basilar  predominant opacification of both lungs. Indistinct pulmonary  vasculature. No pneumothorax. Stable cardiomegaly, likely accentuated by  technique. Calcified atherosclerotic disease in the thoracic aorta.  Partially visualized thoracic spondylosis.        Impression:       Limited study demonstrating stable cardiomegaly, suspected moderate  pulmonary edema, and small to moderate bilateral pleural effusions with  underlying bibasilar atelectasis and/or pneumonia.     Electronically Signed ByDelphine Chanel On:10/17/2019 7:17 AM  This report was finalized on [id number] by  Natalio Chanel, .          Results for orders placed during the hospital encounter of 10/16/19   XR Chest 1 View    Narrative DATE OF EXAM:  10/16/2019 11:55 PM     PROCEDURE:  XR CHEST 1 VW-     INDICATIONS:  soa     COMPARISON:  Radiograph 4/11/2019, 3/22/2019, and 6/1/2017.     TECHNIQUE:   Single radiographic AP view of the chest was obtained.     FINDINGS:  The study is limited by lordotic patient  positioning. Overlying  artifacts. Small to moderate bilateral pleural effusions with basilar  predominant opacification of both lungs. Indistinct pulmonary  vasculature. No pneumothorax. Stable cardiomegaly, likely accentuated by  technique. Calcified atherosclerotic disease in the thoracic aorta.  Partially visualized thoracic spondylosis.        Impression Limited study demonstrating stable cardiomegaly, suspected moderate  pulmonary edema, and small to moderate bilateral pleural effusions with  underlying bibasilar atelectasis and/or pneumonia.     Electronically Signed By-Natalio Chanel On:10/17/2019 7:17 AM  This report was finalized on [id number] by  Natalio Chanel, .       Results for orders placed during the hospital encounter of 19   Duplex Venous Lower Extremity - Bilateral CAR    Narrative                   Lower Extremity Venous Report                          [facility]                         Vascular Laboratory                            [address] [place] [address]    Name: NIGEL QUINTANILLA              Study Date: 2019 01:46 PM  MRN: [id number]                       Patient Location: OU  : 1935 (M/d/yyyy)           Gender: Female  Age: 83 yrs                          Account#: [id number]  Reason For Study: edema      Procedure  Venous study was carried out in bilateral lower extremities. Gray scale, color  flow, and spectral doppler images were obtained. Images were obtained in  transverse and longitudinal views.    Right Lower Extremity Venous  On the right side the patient has patent common femoral, femoral, and  popliteal veins. The main veins are easily compressible. Femoral vein was  mapped in its entirety through the course of the thigh. It is patent and  compresses well. The popliteal vein is patent along with its tributaries and  compresses well with no evidence of any filling defect. Augmentation test is  positive. Respiratory  waves are biphasic. Distal veins are patent. The right  greater and small saphenous veins are patent with good compressibility. There  is fluid retention noted, suggestive of edema. There is a cystic fluid  retention behind the right knee which could be a Baker's cyst.    Left Lower Extremity Venous  On the left side the patient has patent common femoral, femoral, and popliteal  veins. The main veins compress well. Femoral vein was mapped in its entirety  through the course of the thigh. It is patent and compresses well. The  popliteal vein is patent along with its tributaries and compresses well with  no evidence of any filling defect. Augmentation test is positive. Respiratory  waves are biphasic. Distal veins are patent. The left greater and small  saphenous veins are patent with good compressibility. There is fluid retention  noted, suggestive of edema. There is a lymph node noted in the left groin.      Interpretation Summary  No evidence of any deep vein thrombosis or superficial vein thrombosis.  _______________________________________________________________________________    Electronically signed by: Alejandro Escobar MD  on 04/12/2019 04:36 PM    Ordering Physician: ELAINE STUBBS  Referring Physician: ANA LILIA MEJÍA  Performed By: RH           ASSESSMENT / PLAN      Pressure injury of sacral region, stage 2 (CMS/HCC)    1. PAUL/CKD3------Nonoliguric. +ARF/PAUL on top of known CRF/CKD STG 3, with a baseline serum creatinine of 2.1. 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 +ARF/PAUL secondary to decompensated CHF/CP state. Diurese. Avoid hypotension. Check urine and serum studies and renal US. No NSAIDs or IV dye. Follow for dialysis need     2. 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 Check TSH. Follow     3. HTN WITH CKD STG 3------BP okay. Avoid hypotension. No ACE/ARB/DRI     4. ANEMIA OF CKD------Check Fe and hemoccult. Follow for PRBC  and/or EPO/iron need     5. DMII WITH RENAL MANIFESTATIONS-------Glucometers, SSI. BS okay     6. OA/DJD------No NSAIDs. Check uric acid level     7. GERD------ON PPI. Aware or risks with regards to CKD     8. HYPERLIPIDEMIA------On Statin. Check CK, TSH     9. VITAMIN D DEFICIENCY------On supplementation.    CR stable, but still with excess volume  Continue iv lasix    Pio Avila MD  [facility]  [phone]  10/18/19  12:29 PM

## 2019-10-18 NOTE — PLAN OF CARE
Problem: Fall Risk (Adult)  Goal: Identify Related Risk Factors and Signs and Symptoms  Outcome: Ongoing (interventions implemented as appropriate)    Goal: Absence of Fall  Outcome: Ongoing (interventions implemented as appropriate)      Problem: Patient Care Overview  Goal: Plan of Care Review  Outcome: Ongoing (interventions implemented as appropriate)   10/18/19 1612   Coping/Psychosocial   Plan of Care Reviewed With patient   Plan of Care Review   Progress no change   OTHER   Outcome Summary patients confusion increased during shift. patient resting in recliner. continues to be diuresed and has incontinent episodes. no complaints of pain. family at bedside. will continue to monitor.     Goal: Individualization and Mutuality  Outcome: Ongoing (interventions implemented as appropriate)    Goal: Discharge Needs Assessment  Outcome: Ongoing (interventions implemented as appropriate)    Goal: Interprofessional Rounds/Family Conf  Outcome: Ongoing (interventions implemented as appropriate)      Problem: Cardiac: Heart Failure (Adult)  Goal: Signs and Symptoms of Listed Potential Problems Will be Absent, Minimized or Managed (Cardiac: Heart Failure)  Outcome: Ongoing (interventions implemented as appropriate)      Problem: Skin Injury Risk (Adult)  Goal: Identify Related Risk Factors and Signs and Symptoms  Outcome: Ongoing (interventions implemented as appropriate)    Goal: Skin Health and Integrity  Outcome: Ongoing (interventions implemented as appropriate)      Problem: Wound (Includes Pressure Injury) (Adult)  Goal: Signs and Symptoms of Listed Potential Problems Will be Absent, Minimized or Managed (Wound)  Outcome: Ongoing (interventions implemented as appropriate)

## 2019-10-18 NOTE — PROGRESS NOTES
" LOS: 1 day   Patient Care Team:  Chris Remy MD as PCP - General (Geriatric Medicine)  Diana Sesay MD as PCP - Claims Attributed  Adry Bear MD as PCP - Family Medicine      Subjective \"I'm feeling perfect today\"    Interval History:     Subjective: She denies abdominal pain, nausea or vomiting.  She is tolerating diet without difficulty.  She reports bowel movement yesterday without blood noted.  No chest pain or increased shortness of breath this is time.      ROS:   No chest pain, shortness of breath, or cough.       Medication Review:     Current Facility-Administered Medications:   •  acetaminophen (TYLENOL) tablet 650 mg, 650 mg, Oral, Q4H PRN **OR** acetaminophen (TYLENOL) 160 MG/5ML solution 650 mg, 650 mg, Oral, Q4H PRN **OR** acetaminophen (TYLENOL) suppository 650 mg, 650 mg, Rectal, Q4H PRN, Ben Carlisle MD  •  ammonium lactate (AMLACTIN) 12 % cream 1 application, 1 application, Topical, Daily, Ben Carlisle MD, 1 application at 10/18/19 1016  •  aspirin chewable tablet 81 mg, 81 mg, Oral, Daily, Ben Carlisle MD, 81 mg at 10/18/19 1013  •  atorvastatin (LIPITOR) tablet 10 mg, 10 mg, Oral, Daily, Ben Carlisle MD, 10 mg at 10/17/19 1046  •  bisacodyl (DULCOLAX) EC tablet 5 mg, 5 mg, Oral, Every Other Day, Ben Carlisle MD, 5 mg at 10/17/19 1047  •  calcium gluconate 1g/50ml 0.675% NaCl IV SOLN, 1 g, Intravenous, PRN **AND** calcium gluconate 2 g/100 mL NS IVPB, 2 g, Intravenous, PRN **AND** Calcium, , , PRN, Ben Carlisle MD  •  cefTRIAXone (ROCEPHIN) 1 g in sodium chloride 0.9 % 100 mL IVPB, 1 g, Intravenous, Q24H, Ben Carlisle MD, Last Rate: 200 mL/hr at 10/17/19 1438, 1 g at 10/17/19 1438  •  cholecalciferol (VITAMIN D3) tablet 1,000 Units, 1,000 Units, Oral, Daily, Ben Carlisle MD, 1,000 Units at 10/18/19 1014  •  dextrose (D50W) 25 g/ 50mL Intravenous Solution 25 g, 25 g, Intravenous, Q15 Min PRN, Ben Carlisle MD  •  dextrose (GLUTOSE) " oral gel 15 g, 15 g, Oral, Q15 Min PRN, Ben Carlisle MD  •  docusate sodium (COLACE) capsule 100 mg, 100 mg, Oral, BID, Ben Carlisle MD, 100 mg at 10/18/19 1014  •  docusate sodium (COLACE) capsule 100 mg, 100 mg, Oral, BID PRN, Ben Carlisle MD  •  doxycycline (ADOXA) tablet 100 mg, 100 mg, Oral, Q12H, Ben Carlisle MD, 100 mg at 10/18/19 1014  •  febuxostat (ULORIC) tablet 40 mg, 40 mg, Oral, Daily, Ben Carlisle MD, 40 mg at 10/18/19 1014  •  ferrous sulfate EC tablet 324 mg, 324 mg, Oral, Daily With Breakfast, Ben Carlisle MD, 324 mg at 10/18/19 1014  •  furosemide (LASIX) injection 40 mg, 40 mg, Intravenous, Q8H, Diana Sesay MD, 40 mg at 10/18/19 1012  •  gabapentin (NEURONTIN) capsule 300 mg, 300 mg, Oral, Nightly, Ben Carlisle MD, 300 mg at 10/17/19 2135  •  glucagon (human recombinant) (GLUCAGEN DIAGNOSTIC) injection 1 mg, 1 mg, Subcutaneous, Q15 Min PRN, Ben Carlisle MD  •  hydrALAZINE (APRESOLINE) tablet 25 mg, 25 mg, Oral, TID, Ben Carlisle MD, 25 mg at 10/18/19 1015  •  insulin glargine (LANTUS) injection 30 Units, 30 Units, Subcutaneous, Nightly, Ben Carlisle MD, 30 Units at 10/17/19 2139  •  insulin lispro (humaLOG) injection 0-9 Units, 0-9 Units, Subcutaneous, 4x Daily With Meals & Nightly, Ben Carlisle MD, 2 Units at 10/18/19 1012  •  isosorbide mononitrate (IMDUR) 24 hr tablet 120 mg, 120 mg, Oral, Daily, Ben Carlisle MD, 120 mg at 10/18/19 1014  •  ketamine (KETALAR) 20 mg in sodium chloride 0.9 % 100 mL infusion, 20 mg, Intravenous, Daily PRN **AND** Ketamine Vital Signs & Assessment, , , Per Order Details, Ben Carlisle MD  •  ketotifen (ZADITOR) 0.025 % ophthalmic solution 1 drop, 1 drop, Both Eyes, BID, Ben Carlisle MD, 1 drop at 10/18/19 1013  •  levothyroxine (SYNTHROID, LEVOTHROID) tablet 112 mcg, 112 mcg, Oral, Daily, Ben Carlisle MD, 112 mcg at 10/18/19 1013  •  linagliptin (TRADJENTA) tablet 5 mg, 5 mg, Oral, Daily, Ben Carlisle,  MD, 5 mg at 10/18/19 1015  •  magnesium hydroxide (MILK OF MAGNESIA) suspension 2400 mg/10mL 10 mL, 10 mL, Oral, Daily PRN, Ben Carlisle MD  •  Magnesium Sulfate 2 gram Bolus, followed by 8 gram infusion (total Mg dose 10 grams)- Mg less than or equal to 1mg/dL, 2 g, Intravenous, PRN **OR** Magnesium Sulfate 2 gram / 50mL Infusion (GIVE X 3 BAGS TO EQUAL 6GM TOTAL DOSE) - Mg 1.1 - 1.5 mg/dl, 2 g, Intravenous, PRN **OR** Magnesium Sulfate 4 gram infusion- Mg 1.6-1.9 mg/dL, 4 g, Intravenous, PRN, Ben Carlisle MD  •  melatonin tablet 5 mg, 5 mg, Oral, Nightly PRN, Ben Carlisle MD  •  methadone (DOLOPHINE) tablet 10 mg, 10 mg, Oral, TID, Ben Carlisle MD, 10 mg at 10/18/19 1015  •  metoprolol tartrate (LOPRESSOR) tablet 25 mg, 25 mg, Oral, Q12H, Ben Carlisle MD, 25 mg at 10/18/19 1013  •  miconazole (MICOTIN) 2 % powder, , Topical, Q12H, Nellie Forrester APRN  •  nicotine (NICODERM CQ) 21 MG/24HR patch 1 patch, 1 patch, Transdermal, Q24H, Ben Carlisle MD  •  nitroglycerin (NITROSTAT) SL tablet 0.4 mg, 0.4 mg, Sublingual, Q5 Min PRN, Ben Carlisle MD  •  ondansetron (ZOFRAN) injection 4 mg, 4 mg, Intravenous, Q6H PRN, Ben Carlisle MD  •  ondansetron (ZOFRAN) tablet 4 mg, 4 mg, Oral, Q6H PRN, Ben Carlisle MD  •  oxybutynin XL (DITROPAN-XL) 24 hr tablet 5 mg, 5 mg, Oral, Daily, Ben Carlisle MD, 5 mg at 10/18/19 1016  •  pantoprazole (PROTONIX) EC tablet 40 mg, 40 mg, Oral, Daily, Ben Carlisle MD, 40 mg at 10/18/19 1013  •  polyethylene glycol (MIRALAX) powder 17 g, 17 g, Oral, Daily, Ben Carlisle MD, 17 g at 10/17/19 1044  •  potassium chloride (K-DUR,KLOR-CON) CR tablet 40 mEq, 40 mEq, Oral, PRN, Ben Carlisle MD  •  potassium chloride (KLOR-CON) packet 40 mEq, 40 mEq, Oral, PRN, Ben Carlisle MD  •  sertraline (ZOLOFT) tablet 25 mg, 25 mg, Oral, Daily, Ben Carlisle MD, 25 mg at 10/18/19 1015  •  sodium chloride 0.9 % flush 10 mL, 10 mL, Intravenous, Q12H, Ben Carlisle MD, 10 mL  at 10/17/19 2141  •  sodium chloride 0.9 % flush 10 mL, 10 mL, Intravenous, PRN, Ben Carlisle MD  •  vitamin C (ASCORBIC ACID) tablet 1,000 mg, 1,000 mg, Oral, Daily, Ben Carlisle MD, 1,000 mg at 10/18/19 1015  •  zinc oxide 20 % ointment 1 application, 1 application, Topical, BID, Ben Carlisle MD, 1 application at 10/18/19 1016      Objective Resting in chair, no acute distress, no family present    Vital Signs  Temp:  [97.4 °F (36.3 °C)-98 °F (36.7 °C)] 98 °F (36.7 °C)  Heart Rate:  [52-75] 71  Resp:  [17-18] 17  BP: (143-179)/(62-76) 179/72  Physical Exam:    General Appearance:    Awake and alert, in no acute distress   Head:    Normocephalic, without obvious abnormality   Eyes:          Conjunctivae normal, anicteric sclera   Ears:    Hearing intact   Throat:   No oral lesions, no thrush, oral mucosa moist   Neck:   supple, no JVD   Lungs:     respirations regular, even and unlabored    Heart:    Regular rhythm and normal rate, normal S1 and S2, no            murmur, no gallop, no rub   Abdomen:     Normal bowel sounds, soft, non-tender, no rebound or guarding, non-distended   Rectal:     Deferred   Extremities:    no cyanosis, no redness   Skin:   No bleeding, bruising or rash, no jaundice   Neurologic:   Cranial nerves 2 - 12 grossly intact,  sensation   intact        Results Review:    Lab Results (last 24 hours)     Procedure Component Value Units Date/Time    POC Glucose Once [[id number]]  (Abnormal) Collected:  10/18/19 0723    Specimen:  Blood Updated:  10/18/19 0728     Glucose 152 mg/dL      Comment: Serial Number: [id number]:  [id number]       Comprehensive Metabolic Panel [[id number]]  (Abnormal) Collected:  10/18/19 0444    Specimen:  Blood Updated:  10/18/19 0641     Glucose 159 mg/dL      BUN 62 mg/dL      Creatinine 2.76 mg/dL      Sodium 142 mmol/L      Potassium 5.1 mmol/L      Chloride 103 mmol/L      CO2 29.0 mmol/L      Calcium 8.5 mg/dL      Total Protein 6.9 g/dL      Albumin  3.40 g/dL      ALT (SGPT) 10 U/L      AST (SGOT) 19 U/L      Alkaline Phosphatase 84 U/L      Total Bilirubin 0.3 mg/dL      eGFR Non African Amer 16 mL/min/1.73      Globulin 3.5 gm/dL      A/G Ratio 1.0 g/dL      BUN/Creatinine Ratio 22.5     Anion Gap 10.0 mmol/L     Narrative:       GFR Normal >60  Chronic Kidney Disease <60  Kidney Failure <15    Iron [[id number]]  (Normal) Collected:  10/18/19 0444    Specimen:  Blood Updated:  10/18/19 0627     Iron 41 mcg/dL     Magnesium [[id number]]  (Normal) Collected:  10/18/19 0444    Specimen:  Blood Updated:  10/18/19 0627     Magnesium 2.1 mg/dL     Phosphorus [[id number]]  (Abnormal) Collected:  10/18/19 0444    Specimen:  Blood Updated:  10/18/19 0627     Phosphorus 5.3 mg/dL     Calcium, Ionized [[id number]]  (Abnormal) Collected:  10/18/19 0444    Specimen:  Blood Updated:  10/18/19 0609     Ionized Calcium 1.14 mmol/L     CBC (No Diff) [[id number]]  (Abnormal) Collected:  10/18/19 0444    Specimen:  Blood Updated:  10/18/19 0556     WBC 9.80 10*3/mm3      RBC 3.16 10*6/mm3      Hemoglobin 8.8 g/dL      Hematocrit 28.2 %      MCV 89.3 fL      MCH 27.8 pg      MCHC 31.1 g/dL      RDW 21.1 %      RDW-SD 66.9 fl      MPV 9.6 fL      Platelets 121 10*3/mm3     Blood Culture - Blood, Hand, Right [[id number]] Collected:  10/17/19 0023    Specimen:  Blood from Hand, Right Updated:  10/18/19 0030     Blood Culture No growth at 24 hours    Blood Culture - Blood, Arm, Left [[id number]] Collected:  10/17/19 0019    Specimen:  Blood from Arm, Left Updated:  10/18/19 0030     Blood Culture No growth at 24 hours    POC Glucose Once [[id number]]  (Abnormal) Collected:  10/17/19 2019    Specimen:  Blood Updated:  10/17/19 2021     Glucose 148 mg/dL      Comment: Serial Number: [id number]:  [id number]       Vitamin B12 [[id number]]  (Normal) Collected:  10/17/19 0843    Specimen:  Blood Updated:  10/17/19 1647     Vitamin B-12 566 pg/mL     Folate [[id number]]  (Normal) Collected:   10/17/19 0843    Specimen:  Blood Updated:  10/17/19 1647     Folate 9.74 ng/mL     POC Glucose Once [[id number]]  (Normal) Collected:  10/17/19 1640    Specimen:  Blood Updated:  10/17/19 1645     Glucose 97 mg/dL      Comment: Serial Number: [id number]:  [id number]       POC Glucose Once [[id number]]  (Normal) Collected:  10/17/19 1356    Specimen:  Blood Updated:  10/17/19 1359     Glucose 81 mg/dL      Comment: Serial Number: [id number]:  [id number]       POC Glucose Once [[id number]]  (Abnormal) Collected:  10/17/19 1332    Specimen:  Blood Updated:  10/17/19 1333     Glucose 61 mg/dL      Comment: Serial Number: [id number]:  [id number]       POC Glucose Once [[id number]]  (Abnormal) Collected:  10/17/19 1316    Specimen:  Blood Updated:  10/17/19 1322     Glucose 54 mg/dL      Comment: Serial Number: [id number]:  [id number]       Hemoglobin A1c [[id number]]  (Normal) Collected:  10/17/19 0844    Specimen:  Blood Updated:  10/17/19 1312     Hemoglobin A1C 5.4 %     Narrative:       Hemoglobin A1C Reference Range:    <5.7 %        Normal  5.7-6.4 %     Increased risk for diabetes  > 6.4 %        Diabetes       These guidelines have been recommended by the American Diabetic Association for Hgb A1c.      The following 2010 guidelines have been recommended by the American Diabetes Association for Hemoglobin A1c.    HBA1c 5.7-6.4% Increased risk for future diabetes (pre-diabetes)  HBA1c     >6.4% Diabetes    Comprehensive Metabolic Panel [[id number]]  (Abnormal) Collected:  10/17/19 0843    Specimen:  Blood Updated:  10/17/19 1306     Glucose 46 mg/dL      BUN 62 mg/dL      Creatinine 2.66 mg/dL      Sodium 141 mmol/L      Potassium 4.9 mmol/L      Chloride 104 mmol/L      CO2 26.0 mmol/L      Calcium 8.4 mg/dL      Total Protein 6.6 g/dL      Albumin 3.30 g/dL      ALT (SGPT) 8 U/L      AST (SGOT) 15 U/L      Alkaline Phosphatase 74 U/L      Total Bilirubin 0.3 mg/dL      eGFR Non  Amer 17  mL/min/1.73      Globulin 3.3 gm/dL      A/G Ratio 1.0 g/dL      BUN/Creatinine Ratio 23.3     Anion Gap 15.9 mmol/L     Narrative:       GFR Normal >60  Chronic Kidney Disease <60  Kidney Failure <15    CK [[id number]]  (Normal) Collected:  10/17/19 0843    Specimen:  Blood Updated:  10/17/19 1302     Creatine Kinase 41 U/L     Lipid Panel [[id number]] Collected:  10/17/19 0843    Specimen:  Blood Updated:  10/17/19 1302     Total Cholesterol 119 mg/dL      Triglycerides 109 mg/dL      HDL Cholesterol 43 mg/dL      LDL Cholesterol  54 mg/dL      VLDL Cholesterol 21.8 mg/dL      LDL/HDL Ratio 1.26    Narrative:       Cholesterol Reference Ranges  (U.S. Department of Health and Human Services ATP III Classifications)    Desirable          <200 mg/dL  Borderline High    200-239 mg/dL  High Risk          >240 mg/dL      Triglyceride Reference Ranges  (U.S. Department of Health and Human Services ATP III Classifications)    Normal           <150 mg/dL  Borderline High  150-199 mg/dL  High             200-499 mg/dL  Very High        >500 mg/dL    HDL Reference Ranges  (U.S. Department of Health and Human Services ATP III Classifcations)    Low     <40 mg/dl (major risk factor for CHD)  High    >60 mg/dl ('negative' risk factor for CHD)        LDL Reference Ranges  (U.S. Department of Health and Human Services ATP III Classifcations)    Optimal          <100 mg/dL  Near Optimal     100-129 mg/dL  Borderline High  130-159 mg/dL  High             160-189 mg/dL  Very High        >189 mg/dL    Magnesium [[id number]]  (Normal) Collected:  10/17/19 0843    Specimen:  Blood Updated:  10/17/19 1302     Magnesium 2.2 mg/dL     Phosphorus [[id number]]  (Abnormal) Collected:  10/17/19 0843    Specimen:  Blood Updated:  10/17/19 1302     Phosphorus 5.5 mg/dL     C-reactive Protein [[id number]]  (Abnormal) Collected:  10/17/19 0843    Specimen:  Blood Updated:  10/17/19 1302     C-Reactive Protein 2.55 mg/dL     Procalcitonin [[id number]]   "(Normal) Collected:  10/17/19 0843    Specimen:  Blood Updated:  10/17/19 1256     Procalcitonin 0.11 ng/mL     Narrative:       As a Marker for Sepsis (Non-Neonates):   1. <0.5 ng/mL represents a low risk of severe sepsis and/or septic shock.  1. >2 ng/mL represents a high risk of severe sepsis and/or septic shock.    As a Marker for Lower Respiratory Tract Infections that require antibiotic therapy:  PCT on Admission     Antibiotic Therapy             6-12 Hrs later  > 0.5                Strongly Recommended            >0.25 - <0.5         Recommended  0.1 - 0.25           Discouraged                   Remeasure/reassess PCT  <0.1                 Strongly Discouraged          Remeasure/reassess PCT      As 28 day mortality risk marker: \"Change in Procalcitonin Result\" (> 80 % or <=80 %) if Day 0 (or Day 1) and Day 4 values are available. Refer to http://www.[facility] [employer]pct-calculator.com/   Change in PCT <=80 %   A decrease of PCT levels below or equal to 80 % defines a positive change in PCT test result representing a higher risk for 28-day all-cause mortality of patients diagnosed with severe sepsis or septic shock.  Change in PCT > 80 %   A decrease of PCT levels of more than 80 % defines a negative change in PCT result representing a lower risk for 28-day all-cause mortality of patients diagnosed with severe sepsis or septic shock.                Ferritin [[id number]]  (Normal) Collected:  10/17/19 0843    Specimen:  Blood Updated:  10/17/19 1256     Ferritin 90.76 ng/mL     TSH [[id number]]  (Abnormal) Collected:  10/17/19 0843    Specimen:  Blood Updated:  10/17/19 1256     TSH 10.650 uIU/mL      Comment: Results may be falsely decreased if patient taking Biotin.       BNP [[id number]]  (Abnormal) Collected:  10/17/19 0843    Specimen:  Blood Updated:  10/17/19 1256     proBNP 7,805.0 pg/mL     Narrative:       Among patients with dyspnea, NT-proBNP is highly sensitive for the detection of acute congestive heart " failure. In addition NT-proBNP of <300 pg/ml effectively rules out acute congestive heart failure with 99% negative predictive value.    Troponin [[id number]]  (Abnormal) Collected:  10/17/19 0843    Specimen:  Blood Updated:  10/17/19 1211     Troponin T 0.037 ng/mL     Narrative:       Troponin T Reference Range:  <= 0.03 ng/mL-   Negative for AMI  >0.03 ng/mL-     Abnormal for myocardial necrosis.  Clinicians would have to utilize clinical acumen, EKG, Troponin and serial changes to determine if it is an Acute Myocardial Infarction or myocardial injury due to an underlying chronic condition.     Uric Acid [[id number]]  (Abnormal) Collected:  10/17/19 0843    Specimen:  Blood Updated:  10/17/19 1147     Uric Acid 5.8 mg/dL     POC Glucose Once [[id number]]  (Normal) Collected:  10/17/19 1135    Specimen:  Blood Updated:  10/17/19 1140     Glucose 84 mg/dL      Comment: Serial Number: [id number]:  [id number]       Lactic Acid, Plasma [[id number]]  (Normal) Collected:  10/17/19 0844    Specimen:  Blood Updated:  10/17/19 1119     Lactate 1.0 mmol/L     Sedimentation Rate [[id number]]  (Abnormal) Collected:  10/17/19 0844    Specimen:  Blood Updated:  10/17/19 1108     Sed Rate 69 mm/hr           Imaging Results (last 24 hours)     ** No results found for the last 24 hours. **            ASSESSMENT:    Normocytic anemia -acute on chronic  Dyspnea  CHF with history of CAD  CKD  Type 2 diabetes  Chronic pain     PLAN:  Continue PPI and MiraLAX daily.  Hemoglobin stable without evidence of active GI bleeding.  No plan for endoscopic evaluation at this time.  Discussed in detail with family yesterday at bedside.  Can plan outpatient endoscopic evaluation if anemia worsens or does not improve.  Avoid NSAIDs.  We will see inpatient as needed, call with questions or concerns.     Currently on Rocephin.  Nephrology following.  Will hold off on endoscopic evaluation and less active GI bleeding is noted.  Monitor hemoglobin  closely.  Avoid NSAIDs.  Continue supportive care will follow.  Discussed with family at bedside.      VANNESSA Kay  10/18/19  10:18 AM

## 2019-10-18 NOTE — NURSING NOTE
Patient stated that she felt short of breath and was appearing very anxious. o2 sat was obtained and was at 76% on 2 L. Oxygen flow was gradually increased until o2 sat was 97% on 5 L. Patient stated she felt better after oxygen was increased. Call was placed to Dr. Carlisle, awaiting response.

## 2019-10-19 NOTE — PLAN OF CARE
Problem: Patient Care Overview  Goal: Plan of Care Review  Outcome: Ongoing (interventions implemented as appropriate)   10/19/19 9217   Coping/Psychosocial   Plan of Care Reviewed With patient   Plan of Care Review   Progress no change   OTHER   Outcome Summary pt lathargic most of the shift. Had Chest CT, VQ scan, and CXR, no probability of a PE. Stays in recliner because she is able to breathe easier there.     Goal: Individualization and Mutuality  Outcome: Ongoing (interventions implemented as appropriate)    Goal: Discharge Needs Assessment  Outcome: Ongoing (interventions implemented as appropriate)    Goal: Interprofessional Rounds/Family Conf  Outcome: Ongoing (interventions implemented as appropriate)

## 2019-10-19 NOTE — PROGRESS NOTES
[facility] Medicine Services Daily Progress Note      Hospitalist Team  LOS 2 days      Patient Care Team:  Chris Remy MD as PCP - General (Geriatric Medicine)  Diana Sesay MD as PCP - Claims Attributed  Adry Bear MD as PCP - Family Medicine        Chief Complaint / Subjective  Chief Complaint   Patient presents with   • Abnormal Lab     This is a late entry progress note as patient was seen on October 18, 2019 during morning rounds and was supposed to be discharged but apparently she had shortness of breath and she was kept in the hospital    Brief Synopsis of Hospital Course/HPI    Patient is a 84-year-old female was sent from the nursing home as she had some labs done including BNP and troponin and they came out to be abnormally high and patient was sent to the ER for further evaluation.  I have seen the patient today in the ER and also spoke to the ER physician and I have reviewed the records.  Patient has chronic kidney disease and does see a nephrologist and they have been titrating her medications.  Patient also recently had cough congestion generalized weakness and patient was started on Rocephin and doxycycline at the nursing home.  Patient at this time denies any complaints whatsoever except just feeling fatigued and tired.  She denies any fever/chills/nausea/vomiting/diarrhea or any other symptoms.       Review of systems negative for all other 10 systems    ROS    No family history on file.    Past Medical History:   Diagnosis Date   • CHF (congestive heart failure) (CMS/HCC)    • Coronary artery disease    • Diabetes mellitus (CMS/HCC)    • Disease of thyroid gland    • Elevated cholesterol    • GERD (gastroesophageal reflux disease)    • History of transfusion    • Hyperlipidemia    • Hypertension    • PONV (postoperative nausea and vomiting)        Social History     Socioeconomic History   • Marital status:      Spouse name:  "Not on file   • Number of children: Not on file   • Years of education: Not on file   • Highest education level: Not on file   Tobacco Use   • Smoking status: Former Smoker   Substance and Sexual Activity   • Alcohol use: No     Frequency: Never   • Drug use: No   • Sexual activity: Defer           Objective      Vital Signs  Temp:  [97.9 °F (36.6 °C)-98.3 °F (36.8 °C)] 97.9 °F (36.6 °C)  Heart Rate:  [67-73] 72  Resp:  [15-16] 16  BP: (166-187)/(72-83) 186/75  Oxygen Therapy  SpO2: 94 %  Pulse Oximetry Type: Intermittent  Device (Oxygen Therapy): nasal cannula  Flow (L/min): 3  Flowsheet Rows      First Filed Value   Admission Height  152.4 cm (60\") Documented at 10/16/2019 2312   Admission Weight  101 kg (222 lb) Documented at 10/16/2019 2312        Intake & Output (last 3 days)       10/16 0701 - 10/17 0700 10/17 0701 - 10/18 0700 10/18 0701 - 10/19 0700    P.O.   520    Total Intake(mL/kg)   520 (5.3)    Urine (mL/kg/hr)  3 (0) 202 (0.1)    Total Output  3 202    Net  -3 +318           Urine Unmeasured Occurrence  1 x 1 x        Lines, Drains & Airways    Active LDAs     Name:   Placement date:   Placement time:   Site:   Days:    Peripheral IV 10/18/19 0517 Left;Posterior Forearm   10/18/19    0517    Forearm   less than 1                  Physical Exam:     Physical Exam   Constitutional: She is oriented to person, place, and time. She appears well-developed and well-nourished. No distress.   HENT:   Head: Normocephalic and atraumatic.   Nose: Nose normal.   Mouth/Throat: No oropharyngeal exudate.   Eyes: Conjunctivae and EOM are normal. Pupils are equal, round, and reactive to light. Right eye exhibits no discharge. Left eye exhibits no discharge. No scleral icterus.   Neck: Normal range of motion. No JVD present. No tracheal deviation present. No thyromegaly present.   Cardiovascular: Normal rate, regular rhythm, normal heart sounds and intact distal pulses. Exam reveals no gallop and no friction rub.   No " murmur heard.  Pulmonary/Chest: Effort normal and breath sounds normal. No stridor. No respiratory distress. She has no wheezes. She has no rales. She exhibits no tenderness.   Abdominal: Soft. Bowel sounds are normal. She exhibits no distension and no mass. There is no tenderness. There is no rebound and no guarding. No hernia.   Musculoskeletal: Normal range of motion. She exhibits no edema, tenderness or deformity.   Lymphadenopathy:     She has no cervical adenopathy.   Neurological: She is alert and oriented to person, place, and time. No cranial nerve deficit or sensory deficit. She exhibits normal muscle tone. Coordination normal.   Skin: Skin is warm and dry. No rash noted. She is not diaphoretic. No erythema.   Psychiatric: She has a normal mood and affect. Her behavior is normal.   Nursing note and vitals reviewed.        Procedures:              Results Review:     I reviewed the patient's new clinical results.  reviewed    Results from last 7 days   Lab Units 10/18/19  0444 10/17/19  0844 10/17/19  0019   WBC 10*3/mm3 9.80 7.00 9.50   HEMOGLOBIN g/dL 8.8* 8.6* 8.6*   HEMATOCRIT % 28.2* 28.6* 27.7*   PLATELETS 10*3/mm3 121* 110* 148     Results from last 7 days   Lab Units 10/18/19  0444 10/17/19  0843 10/17/19  0019   SODIUM mmol/L 142 141 141   POTASSIUM mmol/L 5.1 4.9 5.2   CHLORIDE mmol/L 103 104 103   CO2 mmol/L 29.0 26.0 29.0   BUN mg/dL 62* 62* 61*   CREATININE mg/dL 2.76* 2.66* 2.72*   CALCIUM mg/dL 8.5* 8.4* 8.6   BILIRUBIN mg/dL 0.3 0.3 0.3   ALK PHOS U/L 84 74 75   ALT (SGPT) U/L 10 8 9   AST (SGOT) U/L 19 15 18   GLUCOSE mg/dL 159* 46* 122*     Results from last 7 days   Lab Units 10/18/19  0444 10/17/19  0843   MAGNESIUM mg/dL 2.1 2.2     Lab Results   Component Value Date    CALCIUM 8.5 (L) 10/18/2019    PHOS 5.3 (H) 10/18/2019     Hemoglobin A1C   Date Value Ref Range Status   10/17/2019 5.4 3.5 - 5.6 % Final     Results from last 7 days   Lab Units 10/17/19  0019   INR  0.98                Microbiology Results (last 10 days)     Procedure Component Value - Date/Time    Respiratory Panel, PCR - Swab, Nasopharynx [[id number]]  (Normal) Collected:  10/17/19 0025    Lab Status:  Final result Specimen:  Swab from Nasopharynx Updated:  10/17/19 0222     ADENOVIRUS, PCR Not Detected     Coronavirus 229E Not Detected     Coronavirus HKU1 Not Detected     Coronavirus NL63 Not Detected     Coronavirus OC43 Not Detected     Human Metapneumovirus Not Detected     Human Rhinovirus/Enterovirus Not Detected     Influenza B PCR Not Detected     Parainfluenza Virus 1 Not Detected     Parainfluenza Virus 2 Not Detected     Parainfluenza Virus 3 Not Detected     Parainfluenza Virus 4 Not Detected     Bordetella pertussis pcr Not Detected     Influenza A H1 2009 PCR Not Detected     Chlamydophila pneumoniae PCR Not Detected     Mycoplasma pneumo by PCR Not Detected     Influenza A PCR Not Detected     Influenza A H3 Not Detected     Influenza A H1 Not Detected     RSV, PCR Not Detected    Blood Culture - Blood, Hand, Right [[id number]] Collected:  10/17/19 0023    Lab Status:  Preliminary result Specimen:  Blood from Hand, Right Updated:  10/19/19 0030     Blood Culture No growth at 2 days    Blood Culture - Blood, Arm, Left [[id number]] Collected:  10/17/19 0019    Lab Status:  Preliminary result Specimen:  Blood from Arm, Left Updated:  10/19/19 0030     Blood Culture No growth at 2 days          ECG/EMG Results (most recent)     None          Results for orders placed during the hospital encounter of 04/12/19   Duplex Venous Lower Extremity - Bilateral CAR    Narrative                   Lower Extremity Venous Report                          [facility]                         Vascular Laboratory                            [address] [place] [address]    Name: NIGEL QUINTANILLA              Study Date: 04/12/2019 01:46 PM  MRN: [id number]                       Patient Location:  OU  : 1935 (M/d/yyyy)           Gender: Female  Age: 83 yrs                          Account#: [id number]  Reason For Study: edema      Procedure  Venous study was carried out in bilateral lower extremities. Gray scale, color  flow, and spectral doppler images were obtained. Images were obtained in  transverse and longitudinal views.    Right Lower Extremity Venous  On the right side the patient has patent common femoral, femoral, and  popliteal veins. The main veins are easily compressible. Femoral vein was  mapped in its entirety through the course of the thigh. It is patent and  compresses well. The popliteal vein is patent along with its tributaries and  compresses well with no evidence of any filling defect. Augmentation test is  positive. Respiratory waves are biphasic. Distal veins are patent. The right  greater and small saphenous veins are patent with good compressibility. There  is fluid retention noted, suggestive of edema. There is a cystic fluid  retention behind the right knee which could be a Baker's cyst.    Left Lower Extremity Venous  On the left side the patient has patent common femoral, femoral, and popliteal  veins. The main veins compress well. Femoral vein was mapped in its entirety  through the course of the thigh. It is patent and compresses well. The  popliteal vein is patent along with its tributaries and compresses well with  no evidence of any filling defect. Augmentation test is positive. Respiratory  waves are biphasic. Distal veins are patent. The left greater and small  saphenous veins are patent with good compressibility. There is fluid retention  noted, suggestive of edema. There is a lymph node noted in the left groin.      Interpretation Summary  No evidence of any deep vein thrombosis or superficial vein thrombosis.  _______________________________________________________________________________    Electronically signed by: Alejandro Escobar MD  on 2019 04:36  PM    Ordering Physician: ELAINE STUBBS  Referring Physician: ANA LILIA MEJÍA  Performed By:          Results for orders placed during the hospital encounter of 19   Adult Transthoracic Echo Complete W/ Cont if Necessary Per Protocol    Narrative                           Adult Echocardiogram Report          [facility]  Cardiology Department  [address] [place] [address]  [id number]      Name: NIGEL QUINTANILLA MStudy Date: 2019 02:13 PM         BP: 135/75 mmHg  MRN: [id number]         Patient Location: OU  : 1935        Gender: Female                          Height: 60 in  Age: 83 yrs            Account#: [id number]                   Weight: 183 lb  Reason For Study: SOA PUL HTN                                  BSA: 1.8 m2  Ordering Physician:  ELAINE STUBBS  Referring Physician:  ANA LILIA MEJÍA  Performed By: SYLVESTER      M-Mode/2-D Measurements:  LVIDd: 4.6 cm       (3.7-5.7) LVPWd: 1.1 cm        (0.8-1.2)  LVIDs: 3.4 cm       (2.3-3.9)  ACS: 1.6 cm         (1.6-3.7) IVSd: 1.1 cm         (0.7-1.2)  LA dimension: 4.9 cm(1.9-4.0) RVDd: 2.6 cm         (0.7-2.4)  FS: 25.8 %          (21-40%)  Ao root diam: 2.3 cm (2.0-3.7)    Comments  Technically adequate study.  Mitral valve is thickened with mild mitral regurgitation.  Tricuspid valve is normal with mild tricuspid regurgitation.  Calculated right ventricle systolic pressure is about 25 mm of mercury  consistent with mild pulmonary hypertension.  Aortic valve is sclerosed with adequate opening motion.  Left atrium is moderately dilated. Aortic root is normal in size. Right  ventricle is mildly dilated.  LV size and contractility appears normal. EF is about 55%.  No pericardial effusion noted.      Interpretation    MMode/2D Measurements & Calculations  ESV(Teich): 49.1 ml                     % IVS thick: 42.5 %  EF(Teich): 50.8 %                       % LVPW thick: 43.3 %                                           LVOT diam: 1.8 cm  Ao root area: 4.1 cm2  EDV(MOD-sp4): 85.4 ml  ESV(MOD-sp4): 38.5 ml  EF(MOD-sp4): 54.9 %    Doppler Measurements & Calculations  MV E max aba: 112.5 cm/sec                MV max P.9 mmHg  MV A max aba: 95.5 cm/sec                 MV mean P.6 mmHg  MV E/A: 1.2                                            Ao V2 max: 139.6 cm/sec  MV dec slope: 618.6 cm/sec2               Ao max P.8 mmHg  MV dec time: 0.18 sec                     Ao V2 mean: 97.4 cm/sec                                            Ao mean P.2 mmHg                                            Ao V2 VTI: 28.6 cm                                              PURA(I,D): 2.4 cm2                                            PURA(V,D): 2.2 cm2  LV V1 max P.1 mmHg                    PA max PG: 3.6 mmHg  LV V1 mean PG: 3.4 mmHg  LV V1 max: 123.8 cm/sec  LV V1 mean: 89.1 cm/sec  LV V1 VTI: 27.1 cm  TR max aba: 264.9 cm/sec  TR max P.4 mmHg  RVSP(TR): 38.4 mmHg      _______________________________________________________________________________    Electronically signed by: Yuval Choudhary MD  on 2019 07:10  PM         Xr Chest 1 View    Result Date: 10/17/2019  Limited study demonstrating stable cardiomegaly, suspected moderate pulmonary edema, and small to moderate bilateral pleural effusions with underlying bibasilar atelectasis and/or pneumonia.  Electronically Signed By-Natalio Chanel On:10/17/2019 7:17 AM This report was finalized on [id number] by  Natalio Chanel, .    Us Renal Bilateral    Result Date: 10/17/2019  Bilateral renal parenchymal atrophy and diffusely increased bilateral renal echogenicity, likely reflecting medical renal disease.  Electronically Signed By-Natalio Chanel On:10/17/2019 9:46 AM This report was finalized on [id number] by  Natalio Chanel, .      Xrays, labs reviewed personally by physician.    Medication Review:   I have reviewed the patient's current medication  list  reviewed    Scheduled Meds    ammonium lactate 1 application Topical Daily   aspirin 81 mg Oral Daily   atorvastatin 10 mg Oral Daily   bisacodyl 5 mg Oral Every Other Day   ceftriaxone 1 g Intravenous Q24H   cholecalciferol 1,000 Units Oral Daily   docusate sodium 100 mg Oral BID   doxycycline 100 mg Oral Q12H   febuxostat 40 mg Oral Daily   ferrous sulfate 324 mg Oral Daily With Breakfast   furosemide 40 mg Intravenous Q8H   gabapentin 300 mg Oral Nightly   hydrALAZINE 25 mg Oral TID   insulin glargine 30 Units Subcutaneous Nightly   insulin lispro 0-9 Units Subcutaneous 4x Daily With Meals & Nightly   isosorbide mononitrate 120 mg Oral Daily   ketotifen 1 drop Both Eyes BID   levothyroxine 112 mcg Oral Daily   linagliptin 5 mg Oral Daily   methadone 10 mg Oral TID   metoprolol tartrate 25 mg Oral Q12H   miconazole  Topical Q12H   nicotine 1 patch Transdermal Q24H   oxybutynin XL 5 mg Oral Daily   pantoprazole 40 mg Oral Daily   polyethylene glycol 17 g Oral Daily   sertraline 25 mg Oral Daily   sodium chloride 10 mL Intravenous Q12H   vitamin C 1,000 mg Oral Daily   zinc oxide 1 application Topical BID       Meds Infusions       Meds PRN  •  acetaminophen **OR** acetaminophen **OR** acetaminophen  •  Calcium Gluconate-NaCl **AND** calcium gluconate **AND** Calcium  •  dextrose  •  dextrose  •  docusate sodium  •  glucagon (human recombinant)  •  ketamine (KETALAR) infusion **AND** Ketamine Vital Signs & Assessment  •  magnesium hydroxide  •  magnesium sulfate **OR** magnesium sulfate **OR** magnesium sulfate  •  melatonin  •  nitroglycerin  •  ondansetron  •  ondansetron  •  potassium chloride  •  potassium chloride  •  sodium chloride        Assessment / Plan    Active Hospital Problems    Diagnosis  POA   • Pressure injury of sacral region, stage 2 (CMS/HCC) [L89.152]  Yes      Resolved Hospital Problems    Diagnosis Date Resolved POA   • Acute congestive heart failure (CMS/HCC) [I50.9] 10/18/2019 Yes        #1 cough congestion shortness of breath with abnormal chest x-ray possible pneumonia and we will continue Rocephin and doxycycline as those are started at nursing home.  -Patient has d-dimer positive so we will do CT chest without contrast and also VQ scan     #2 chronic diastolic CHF with EF 55% about few months ago on echocardiogram as well as patient has high proBNP and we will continue current home medications and at this time patient does not seem to be in acute CHF exacerbation.  I will do CT scan chest to further evaluate the shadow we are seeing on the right lungs.  -Patient is on Bumex as well as beta-blocker     #3 chronic kidney disease stage IV and patient will be seen by nephrology for further management  -Nephrology is managing patient clinical problem     #4 anemia of chronic kidney disease versus possible GI bleed and patient has recently been transfused and will get GI consult to see if patient need upper or lower endoscopy.  -Repeat labs     #5 bladder incontinence patient be continued on home medications     #6 diabetes mellitus type 2 patient is on linagliptin/Lantus insulin and will check Accu-Cheks and use sliding scale insulin     #7 hypertension patient is on Lopressor as well as hydralazine and will continue it     #8 diabetic neuropathy and will continue gabapentin     #9 chronic gout patient is on Uloric continue     #10 GERD patient is on PPI and will continue it     #11 chronic methadone therapy, not sure why she is on it but will continue at this time since it was being given at the nursing home.     #12 hyperlipidemia and patient is on statin therapy and will continue it     #13 DVT prophylaxis and I will hold off on using Lovenox since patient has low hemoglobin and will be she is having GI bleed and has required blood transfusion recently.     #14 hypothyroidism continue levothyroxine     #15 depression patient is on Zoloft will continue it       Ben Carlisle MD

## 2019-10-19 NOTE — PROGRESS NOTES
NEPHROLOGY PROGRESS NOTE------[facility]    [facility]  [phone]  Pio Avila MD      Patient Care Team:  Chris Remy MD as PCP - General (Geriatric Medicine)  Diana Sesay MD as PCP - Claims Attributed  Adry Bear MD as PCP - Family Medicine      Provider:  Pio Avila MD  Patient Name: Amanda Combs  :  1935    SUBJECTIVE:  F/u PAUL/CKD3  Still SOA    Medication:    ammonium lactate 1 application Topical Daily   aspirin 81 mg Oral Daily   atorvastatin 10 mg Oral Daily   bisacodyl 5 mg Oral Every Other Day   ceftriaxone 1 g Intravenous Q24H   cholecalciferol 1,000 Units Oral Daily   docusate sodium 100 mg Oral BID   doxycycline 100 mg Oral Q12H   febuxostat 40 mg Oral Daily   ferrous sulfate 324 mg Oral Daily With Breakfast   furosemide 40 mg Intravenous Q8H   gabapentin 300 mg Oral Nightly   hydrALAZINE 25 mg Oral TID   insulin glargine 30 Units Subcutaneous Nightly   insulin lispro 0-9 Units Subcutaneous 4x Daily With Meals & Nightly   isosorbide mononitrate 120 mg Oral Daily   ketotifen 1 drop Both Eyes BID   levothyroxine 112 mcg Oral Daily   linagliptin 5 mg Oral Daily   methadone 10 mg Oral TID   metoprolol tartrate 25 mg Oral Q12H   miconazole  Topical Q12H   nicotine 1 patch Transdermal Q24H   oxybutynin XL 5 mg Oral Daily   pantoprazole 40 mg Oral Daily   polyethylene glycol 17 g Oral Daily   sertraline 25 mg Oral Daily   sodium chloride 10 mL Intravenous Q12H   vitamin C 1,000 mg Oral Daily   zinc oxide 1 application Topical BID          OBJECTIVE    Vital Sign Min/Max for last 24 hours  Temp  Min: 97.9 °F (36.6 °C)  Max: 98.3 °F (36.8 °C)   BP  Min: 166/83  Max: 187/74   Pulse  Min: 67  Max: 73   Resp  Min: 15  Max: 16   SpO2  Min: 90 %  Max: 98 %   No Data Recorded   Weight  Min: 98.2 kg (216 lb 7.9 oz)  Max: 98.2 kg (216 lb 7.9 oz)     Flowsheet Rows      First Filed Value   Admission Height  152.4 cm  "(60\") Documented at 10/16/2019 2312   Admission Weight  101 kg (222 lb) Documented at 10/16/2019 2312          No intake/output data recorded.  I/O last 3 completed shifts:  In: 520 [P.O.:520]  Out: 205 [Urine:205]    Physical Exam:  General Appearance: alert, appears stated age and cooperative  Head: normocephalic, without obvious abnormality and atraumatic  Eyes: conjunctivae and sclerae normal and no icterus  Neck: supple and no JVD  Lungs: clear to auscultation and respirations regular  Heart: regular rhythm & normal rate and normal S1, S2  Chest: Wall no abnormalities observed  Abdomen: normal bowel sounds and soft non-tender  Extremities: moves extremities well, 1+ edema, no cyanosis and no redness  Skin: no bleeding, bruising or rash, turgor normal, color normal and no leasions noted  Neurologic: Alert, and oriented. No focal deficits    Labs:    WBC WBC   Date Value Ref Range Status   10/19/2019 9.00 3.40 - 10.80 10*3/mm3 Final     Comment:     Result checked    10/18/2019 9.80 3.40 - 10.80 10*3/mm3 Final   10/17/2019 7.00 3.40 - 10.80 10*3/mm3 Final   10/17/2019 9.50 3.40 - 10.80 10*3/mm3 Final      HGB Hemoglobin   Date Value Ref Range Status   10/19/2019 8.4 (L) 12.0 - 15.9 g/dL Final   10/18/2019 8.8 (L) 12.0 - 15.9 g/dL Final   10/17/2019 8.6 (L) 12.0 - 15.9 g/dL Final   10/17/2019 8.6 (L) 12.0 - 15.9 g/dL Final      HCT Hematocrit   Date Value Ref Range Status   10/19/2019 26.7 (L) 34.0 - 46.6 % Final   10/18/2019 28.2 (L) 34.0 - 46.6 % Final   10/17/2019 28.6 (L) 34.0 - 46.6 % Final   10/17/2019 27.7 (L) 34.0 - 46.6 % Final      Platlets No results found for: LABPLAT   MCV MCV   Date Value Ref Range Status   10/19/2019 87.7 79.0 - 97.0 fL Final   10/18/2019 89.3 79.0 - 97.0 fL Final   10/17/2019 90.4 79.0 - 97.0 fL Final   10/17/2019 89.0 79.0 - 97.0 fL Final          Sodium Sodium   Date Value Ref Range Status   10/19/2019 142 136 - 145 mmol/L Final   10/18/2019 142 136 - 145 mmol/L Final "   10/17/2019 141 136 - 145 mmol/L Final   10/17/2019 141 136 - 145 mmol/L Final      Potassium Potassium   Date Value Ref Range Status   10/19/2019 4.5 3.5 - 5.2 mmol/L Final   10/18/2019 5.1 3.5 - 5.2 mmol/L Final   10/17/2019 4.9 3.5 - 5.2 mmol/L Final   10/17/2019 5.2 3.5 - 5.2 mmol/L Final      Chloride Chloride   Date Value Ref Range Status   10/19/2019 100 98 - 107 mmol/L Final   10/18/2019 103 98 - 107 mmol/L Final   10/17/2019 104 98 - 107 mmol/L Final   10/17/2019 103 98 - 107 mmol/L Final      CO2 CO2   Date Value Ref Range Status   10/19/2019 30.0 (H) 22.0 - 29.0 mmol/L Final   10/18/2019 29.0 22.0 - 29.0 mmol/L Final   10/17/2019 26.0 22.0 - 29.0 mmol/L Final   10/17/2019 29.0 22.0 - 29.0 mmol/L Final      BUN BUN   Date Value Ref Range Status   10/19/2019 66 (H) 8 - 23 mg/dL Final   10/18/2019 62 (H) 8 - 23 mg/dL Final   10/17/2019 62 (H) 8 - 23 mg/dL Final   10/17/2019 61 (H) 8 - 23 mg/dL Final      Creatinine Creatinine   Date Value Ref Range Status   10/19/2019 2.64 (H) 0.57 - 1.00 mg/dL Final   10/18/2019 2.76 (H) 0.57 - 1.00 mg/dL Final   10/17/2019 2.66 (H) 0.57 - 1.00 mg/dL Final   10/17/2019 2.72 (H) 0.57 - 1.00 mg/dL Final      Calcium Calcium   Date Value Ref Range Status   10/19/2019 8.6 8.6 - 10.5 mg/dL Final   10/18/2019 8.5 (L) 8.6 - 10.5 mg/dL Final   10/17/2019 8.4 (L) 8.6 - 10.5 mg/dL Final   10/17/2019 8.6 8.6 - 10.5 mg/dL Final      PO4 No components found for: PO4   Albumin Albumin   Date Value Ref Range Status   10/19/2019 3.50 3.50 - 5.20 g/dL Final   10/18/2019 3.40 (L) 3.50 - 5.20 g/dL Final   10/17/2019 3.30 (L) 3.50 - 5.20 g/dL Final   10/17/2019 3.50 3.50 - 5.20 g/dL Final      Magnesium Magnesium   Date Value Ref Range Status   10/19/2019 1.9 1.6 - 2.4 mg/dL Final   10/18/2019 2.1 1.6 - 2.4 mg/dL Final   10/17/2019 2.2 1.6 - 2.4 mg/dL Final      Uric Acid No components found for: URIC ACID     Imaging Results (last 72 hours)     Procedure Component Value Units Date/Time    US  Renal Bilateral [[id number]] Collected:  10/17/19 0945     Updated:  10/17/19 0949    Narrative:       Examination: US RENAL BILATERAL-     Date of Exam: 10/17/2019 9:08 AM     Indication: ARF/CRF; I50.9-Heart failure, unspecified; R79.89-Other  specified abnormal findings of blood chemistry.     Comparison: 3/22/2019.     Technique: Grayscale and color Doppler ultrasound evaluation of the  kidneys and urinary bladder was performed     Findings:  Study is mildly limited by the patient's body habitus. Bilateral renal  parenchymal atrophy with diffusely increased bilateral renal  echogenicity, likely reflecting medical renal disease. No solid renal  mass, shadowing stone, or hydronephrosis. The right kidney measures 9.8  cm in length. The left kidney measures 8.1 cm in length. Limited imaging  of the urinary bladder is unremarkable.       Impression:       Bilateral renal parenchymal atrophy and diffusely increased bilateral  renal echogenicity, likely reflecting medical renal disease.     Electronically Signed By-Natalio Chanel On:10/17/2019 9:46 AM  This report was finalized on [id number] by  Natalio Chanel, .    XR Chest 1 View [[id number]] Collected:  10/17/19 0715     Updated:  10/17/19 0719    Narrative:       DATE OF EXAM:  10/16/2019 11:55 PM     PROCEDURE:  XR CHEST 1 VW-     INDICATIONS:  soa     COMPARISON:  Radiograph 4/11/2019, 3/22/2019, and 6/1/2017.     TECHNIQUE:   Single radiographic AP view of the chest was obtained.     FINDINGS:  The study is limited by lordotic patient positioning. Overlying  artifacts. Small to moderate bilateral pleural effusions with basilar  predominant opacification of both lungs. Indistinct pulmonary  vasculature. No pneumothorax. Stable cardiomegaly, likely accentuated by  technique. Calcified atherosclerotic disease in the thoracic aorta.  Partially visualized thoracic spondylosis.        Impression:       Limited study demonstrating stable cardiomegaly, suspected  moderate  pulmonary edema, and small to moderate bilateral pleural effusions with  underlying bibasilar atelectasis and/or pneumonia.     Electronically Signed By-Natalio Chanel On:10/17/2019 7:17 AM  This report was finalized on [id number] by  Natalio Chanel, .          Results for orders placed during the hospital encounter of 10/16/19   XR Chest PA & Lateral    Narrative    DATE OF EXAM:   10/19/2019 9:44 AM     PROCEDURE:   XR CHEST PA AND LATERAL-     INDICATIONS:   elevated D-Dimer 4.59. Not appropriate for CT PE d/t elevated  creatinine; I50.9-Heart failure, unspecified; R79.89-Other specified  abnormal findings of blood chemistry     COMPARISON:  10/16/2019     TECHNIQUE:   [PA and lateral views of the chest were obtained.]     FINDINGS:   There are small-to-moderate bilateral pleural effusions with bibasilar  airspace disease likely compressive atelectasis. Cardiomegaly and  findings of pulmonary edema again noted. No pneumothorax.        Impression 1. Cardiomegaly and pulmonary edema, unchanged.  2. No change in small to moderate bilateral pleural effusions with  bibasilar airspace disease.     Electronically Signed By-Vasile Ludwig On:10/19/2019 9:51 AM  This report was finalized on [id number] by  Vasile Ludwig, .   XR Chest 1 View    Narrative DATE OF EXAM:  10/16/2019 11:55 PM     PROCEDURE:  XR CHEST 1 VW-     INDICATIONS:  soa     COMPARISON:  Radiograph 4/11/2019, 3/22/2019, and 6/1/2017.     TECHNIQUE:   Single radiographic AP view of the chest was obtained.     FINDINGS:  The study is limited by lordotic patient positioning. Overlying  artifacts. Small to moderate bilateral pleural effusions with basilar  predominant opacification of both lungs. Indistinct pulmonary  vasculature. No pneumothorax. Stable cardiomegaly, likely accentuated by  technique. Calcified atherosclerotic disease in the thoracic aorta.  Partially visualized thoracic spondylosis.        Impression Limited study demonstrating  stable cardiomegaly, suspected moderate  pulmonary edema, and small to moderate bilateral pleural effusions with  underlying bibasilar atelectasis and/or pneumonia.     Electronically Signed By-Natalio Chanel On:10/17/2019 7:17 AM  This report was finalized on [id number] by  Natalio Chanel, .       Results for orders placed during the hospital encounter of 19   Duplex Venous Lower Extremity - Bilateral CAR    Narrative                   Lower Extremity Venous Report                          [facility]                         Vascular Laboratory                            [address] [place] [address]    Name: NIGEL QUINTANILLA              Study Date: 2019 01:46 PM  MRN: [id number]                       Patient Location: OU  : 1935 (M/d/yyyy)           Gender: Female  Age: 83 yrs                          Account#: [id number]  Reason For Study: edema      Procedure  Venous study was carried out in bilateral lower extremities. Gray scale, color  flow, and spectral doppler images were obtained. Images were obtained in  transverse and longitudinal views.    Right Lower Extremity Venous  On the right side the patient has patent common femoral, femoral, and  popliteal veins. The main veins are easily compressible. Femoral vein was  mapped in its entirety through the course of the thigh. It is patent and  compresses well. The popliteal vein is patent along with its tributaries and  compresses well with no evidence of any filling defect. Augmentation test is  positive. Respiratory waves are biphasic. Distal veins are patent. The right  greater and small saphenous veins are patent with good compressibility. There  is fluid retention noted, suggestive of edema. There is a cystic fluid  retention behind the right knee which could be a Baker's cyst.    Left Lower Extremity Venous  On the left side the patient has patent common femoral, femoral, and popliteal  veins. The  main veins compress well. Femoral vein was mapped in its entirety  through the course of the thigh. It is patent and compresses well. The  popliteal vein is patent along with its tributaries and compresses well with  no evidence of any filling defect. Augmentation test is positive. Respiratory  waves are biphasic. Distal veins are patent. The left greater and small  saphenous veins are patent with good compressibility. There is fluid retention  noted, suggestive of edema. There is a lymph node noted in the left groin.      Interpretation Summary  No evidence of any deep vein thrombosis or superficial vein thrombosis.  _______________________________________________________________________________    Electronically signed by: Alejandro Escobar MD  on 04/12/2019 04:36 PM    Ordering Physician: ELAINE STUBBS  Referring Physician: ANA LILIA MEJÍA  Performed By: RH           ASSESSMENT / PLAN      Pressure injury of sacral region, stage 2 (CMS/HCC)    1. PAUL/CKD3------Nonoliguric. +ARF/PAUL on top of known CRF/CKD STG 3, with a baseline serum creatinine of 2.1. 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 +ARF/PAUL secondary to decompensated CHF/CP state. Diurese. Avoid hypotension. Check urine and serum studies and renal US. No NSAIDs or IV dye. Follow for dialysis need     2. 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 Check TSH. Follow     3. HTN WITH CKD STG 3------BP okay. Avoid hypotension. No ACE/ARB/DRI     4. ANEMIA OF CKD------Check Fe and hemoccult. Follow for PRBC and/or EPO/iron need     5. DMII WITH RENAL MANIFESTATIONS-------Glucometers, SSI. BS okay     6. OA/DJD------No NSAIDs. Check uric acid level     7. GERD------ON PPI. Aware or risks with regards to CKD     8. HYPERLIPIDEMIA------On Statin. Check CK, TSH     9. VITAMIN D DEFICIENCY------On supplementation.    CR stable, but still with excess volume  Continue iv lasix, add metolazone  CT with  pulm edema, pleural effusion    Pio Avila MD  [facility]  [phone]  10/19/19  10:35 AM

## 2019-10-19 NOTE — NURSING NOTE
D-Dimer noted to be elevated at 4.59. On call MD [place] notified. Originally md ordered CT PE protocol however d/t creatinine level 2.76 pt not candidate to have contrast and VQ Scan ordered instead. Pt currently on 5L high flow NC with sats of 95%. No c/o discomfort or pain. Pt appears to be breathing even and deep without noted use of accessory muscles. Currently sitting in recliner per patient preference. Will continue to monitor.

## 2019-10-19 NOTE — PLAN OF CARE
Problem: Patient Care Overview  Goal: Plan of Care Review  Outcome: Ongoing (interventions implemented as appropriate)   10/19/19 0403   Coping/Psychosocial   Plan of Care Reviewed With patient   OTHER   Outcome Summary pt continues with confusion. pt is continuing to refuse getting into bed. States she is more comfortably in recliner. During shift change it was noted that patient was having difficulty breathing. Continous o2 monitoring in place after noted decrease in o2 sats. DDimer ordered and noted elevated. MD aware with orders for VQ scan d/t increased creatinine levels. Pt currently stable. Will continue to monitor,        Problem: Cardiac: Heart Failure (Adult)  Goal: Signs and Symptoms of Listed Potential Problems Will be Absent, Minimized or Managed (Cardiac: Heart Failure)  Outcome: Ongoing (interventions implemented as appropriate)   10/19/19 0403   Goal/Outcome Evaluation   Problems Assessed (Heart Failure) all   Problems Present (Heart Failure) fluid/electrolyte imbalance       Problem: Skin Injury Risk (Adult)  Goal: Identify Related Risk Factors and Signs and Symptoms  Outcome: Ongoing (interventions implemented as appropriate)   10/19/19 0403   Skin Injury Risk (Adult)   Related Risk Factors (Skin Injury Risk) advanced age;cognitive impairment;mobility impaired;moisture     Goal: Skin Health and Integrity  Outcome: Ongoing (interventions implemented as appropriate)   10/19/19 0403   Skin Injury Risk (Adult)   Skin Health and Integrity making progress toward outcome       Problem: Wound (Includes Pressure Injury) (Adult)  Goal: Signs and Symptoms of Listed Potential Problems Will be Absent, Minimized or Managed (Wound)  Outcome: Ongoing (interventions implemented as appropriate)   10/19/19 0403   Goal/Outcome Evaluation   Problems Assessed (Wound) all   Problems Present (Wound) delayed wound healing

## 2019-10-19 NOTE — CONSULTS
Nutrition Services    Patient Name:  Amanda Combs  YOB: 1935  MRN: [id number]  Admit Date:  10/16/2019    Consult received /nsg admission screen. Wt hx trending up, BMI 42.3. Ordering full meals on regular diet with protein rich entree and sides. 75% x1 meal documented. Stage II documented on perirectal area. No further nutrition intervention at this time, will follow intakes as LOS unless otherwise indicated.    Electronically signed by:  Jeanie Barbosa RD  10/19/19 12:30 PM

## 2019-10-19 NOTE — PROGRESS NOTES
Hospitalist Team      Patient Care Team:  Chris Remy MD as PCP - General (Geriatric Medicine)  Diana Sesay MD as PCP - Claims Attributed  Adry Bear MD as PCP - Family Medicine    No family history on file.    Past Medical History:   Diagnosis Date   • CHF (congestive heart failure) (CMS/Formerly [facility])    • Coronary artery disease    • Diabetes mellitus (CMS/Formerly [facility])    • Disease of thyroid gland    • Elevated cholesterol    • GERD (gastroesophageal reflux disease)    • History of transfusion    • Hyperlipidemia    • Hypertension    • PONV (postoperative nausea and vomiting)        Social History     Socioeconomic History   • Marital status:      Spouse name: Not on file   • Number of children: Not on file   • Years of education: Not on file   • Highest education level: Not on file   Tobacco Use   • Smoking status: Former Smoker   Substance and Sexual Activity   • Alcohol use: No     Frequency: Never   • Drug use: No   • Sexual activity: Defer       Chief Complaint:  Abnormal lab     Subjective  HPI:  Patient is a 84-year-old female was sent from the nursing home as she had some labs done including BNP and troponin and they came out to be abnormally high and patient was sent to the ER for further evaluation.  I have seen the patient today in the ER and also spoke to the ER physician and I have reviewed the records.  Patient has chronic kidney disease and does see a nephrologist and they have been titrating her medications.  Patient also recently had cough congestion generalized weakness and patient was started on Rocephin and doxycycline at the nursing home.  Patient at this time denies any complaints whatsoever except just feeling fatigued and tired.  She denies any fever/chills/nausea/vomiting/diarrhea or any other symptoms.    10/18/2019 GI saw patient for possible blood loss, but patient's family was not currently willing to consider an endoscopy evaluation unless absolutely  "needed.  She was started on PPI and MiraLAX twice daily.  Patient was to be discharged.  Patient began to have short of breath and nurse had increase oxygen to 5 L nasal cannula.  D-dimer was checked and came back elevated.  CT chest without contrast and VQ scan ordered.    10/19/2019 Patient has complaints today of 10 out of 10 back pain.  She does appear ill today.  VQ scan is pending.  CT chest without contrast came back and showed 1. Cardiomegaly with mild interlobular septal thickening and scattered groundglass opacities most consistent with pulmonary edema.Small-to-moderate bilateral pleural effusions with lower lobe airspace disease likely compressive atelectasis, superimposed pneumonia difficult to exclude at the right lower lobe.Mildly enlarged mediastinal lymph nodes several of which demonstrate normal fatty ratna may relate to reactive adenopathy.  Patient is receiving receiving IV Lasix.   Patient is currently on 2 L nasal cannula.    Interval History and ROS:         Review of Systems   Constitutional: Negative.    HENT: Negative.    Respiratory: Negative.    Cardiovascular: Negative.    Gastrointestinal: Negative.    Genitourinary: Negative.    Musculoskeletal: Positive for back pain (10/10).   Skin: Negative.    Neurological: Negative.    Psychiatric/Behavioral: Negative.          Objective    Vital Signs  Temp:  [97.9 °F (36.6 °C)-98.3 °F (36.8 °C)] 97.9 °F (36.6 °C)  Heart Rate:  [67-73] 72  Resp:  [15-16] 16  BP: (166-187)/(69-83) 179/69  Oxygen Therapy  SpO2: 94 %  Pulse Oximetry Type: Intermittent  Device (Oxygen Therapy): nasal cannula  Flow (L/min): 3  Flowsheet Rows      First Filed Value   Admission Height  152.4 cm (60\") Documented at 10/16/2019 2312   Admission Weight  101 kg (222 lb) Documented at 10/16/2019 2312        Intake & Output (last 3 days)       10/16 0701 - 10/17 0700 10/17 0701 - 10/18 0700 10/18 0701 - 10/19 0700 10/19 0701 - 10/20 0700    P.O.   520     Total Intake(mL/kg)   " 520 (5.3)     Urine (mL/kg/hr)  3 (0) 202 (0.1)     Total Output  3 202     Net  -3 +318             Urine Unmeasured Occurrence  1 x 1 x         Lines, Drains & Airways    Active LDAs     Name:   Placement date:   Placement time:   Site:   Days:    Peripheral IV 10/18/19 0517 Left;Posterior Forearm   10/18/19    0517    Forearm   1                Physical Exam:     Physical Exam   Constitutional:   Ill-appearing   HENT:   Head: Normocephalic and atraumatic.   Eyes: Conjunctivae and EOM are normal. Pupils are equal, round, and reactive to light.   Neck: Normal range of motion.   Cardiovascular: Normal rate, regular rhythm, normal heart sounds and intact distal pulses.   S1, S2 audible   Pulmonary/Chest: She is in respiratory distress (slightly noted).   On 2 L NC, Slight expiratory wheezing noted throughout upper lobes   Abdominal: Soft. Bowel sounds are normal.   Musculoskeletal: She exhibits edema (+1 pitting edema ).   Neurological: She is alert.   Oriented X2, confusion    Skin: Skin is warm and dry.   Psychiatric: Mood, memory, affect and judgment normal.   Vitals reviewed.      Results Review:     I reviewed the patient's new clinical results.    Lab Results (last 72 hours)     Procedure Component Value Units Date/Time    Urine Culture - Urine, Urine, Clean Catch [[id number]]  (Abnormal) Collected:  10/18/19 1830    Specimen:  Urine, Clean Catch Updated:  10/19/19 1044     Urine Culture >100,000 CFU/mL Enterococcus species    Basic Metabolic Panel [[id number]]  (Abnormal) Collected:  10/19/19 0926    Specimen:  Blood Updated:  10/19/19 1040     Glucose 142 mg/dL      BUN 66 mg/dL      Creatinine 2.61 mg/dL      Sodium 142 mmol/L      Potassium 4.3 mmol/L      Chloride 101 mmol/L      CO2 29.0 mmol/L      Calcium 8.8 mg/dL      eGFR Non African Amer 17 mL/min/1.73      BUN/Creatinine Ratio 25.3     Anion Gap 12.0 mmol/L     Narrative:       GFR Normal >60  Chronic Kidney Disease <60  Kidney Failure <15    POC  Glucose Once [[id number]]  (Abnormal) Collected:  10/19/19 0959    Specimen:  Blood Updated:  10/19/19 1001     Glucose 136 mg/dL      Comment: Serial Number: [id number]:  [id number]       CK [[id number]]  (Normal) Collected:  10/19/19 0642    Specimen:  Blood Updated:  10/19/19 0932     Creatine Kinase 81 U/L     Troponin [[id number]]  (Abnormal) Collected:  10/19/19 0642    Specimen:  Blood Updated:  10/19/19 0932     Troponin T 0.052 ng/mL     Narrative:       Troponin T Reference Range:  <= 0.03 ng/mL-   Negative for AMI  >0.03 ng/mL-     Abnormal for myocardial necrosis.  Clinicians would have to utilize clinical acumen, EKG, Troponin and serial changes to determine if it is an Acute Myocardial Infarction or myocardial injury due to an underlying chronic condition.     Comprehensive Metabolic Panel [[id number]]  (Abnormal) Collected:  10/19/19 0642    Specimen:  Blood Updated:  10/19/19 0855     Glucose 122 mg/dL      BUN 66 mg/dL      Creatinine 2.64 mg/dL      Sodium 142 mmol/L      Potassium 4.5 mmol/L      Chloride 100 mmol/L      CO2 30.0 mmol/L      Calcium 8.6 mg/dL      Total Protein 6.8 g/dL      Albumin 3.50 g/dL      ALT (SGPT) 8 U/L      AST (SGOT) 17 U/L      Alkaline Phosphatase 79 U/L      Total Bilirubin 0.5 mg/dL      eGFR Non African Amer 17 mL/min/1.73      Globulin 3.3 gm/dL      A/G Ratio 1.1 g/dL      BUN/Creatinine Ratio 25.0     Anion Gap 12.0 mmol/L     Narrative:       GFR Normal >60  Chronic Kidney Disease <60  Kidney Failure <15    Magnesium [[id number]]  (Normal) Collected:  10/19/19 0642    Specimen:  Blood Updated:  10/19/19 0855     Magnesium 1.9 mg/dL     Phosphorus [[id number]]  (Normal) Collected:  10/19/19 0642    Specimen:  Blood Updated:  10/19/19 0855     Phosphorus 4.2 mg/dL     BNP [[id number]]  (Abnormal) Collected:  10/19/19 0642    Specimen:  Blood Updated:  10/19/19 0850     proBNP 12,632.0 pg/mL     Narrative:       Among patients with dyspnea, NT-proBNP is highly  sensitive for the detection of acute congestive heart failure. In addition NT-proBNP of <300 pg/ml effectively rules out acute congestive heart failure with 99% negative predictive value.    CBC & Differential [[id number]] Collected:  10/19/19 0642    Specimen:  Blood Updated:  10/19/19 0812    Narrative:       The following orders were created for panel order CBC & Differential.  Procedure                               Abnormality         Status                     ---------                               -----------         ------                     CBC Auto Differential[[id number]]        Abnormal            Final result                 Please view results for these tests on the individual orders.    CBC Auto Differential [[id number]]  (Abnormal) Collected:  10/19/19 0642    Specimen:  Blood Updated:  10/19/19 0812     WBC 9.00 10*3/mm3      Comment: Result checked         RBC 3.05 10*6/mm3      Hemoglobin 8.4 g/dL      Hematocrit 26.7 %      MCV 87.7 fL      MCH 27.6 pg      MCHC 31.4 g/dL      RDW 20.8 %      RDW-SD 64.8 fl      MPV 9.2 fL      Platelets 127 10*3/mm3      Neutrophil % 81.7 %      Lymphocyte % 9.3 %      Monocyte % 7.1 %      Eosinophil % 1.4 %      Basophil % 0.5 %      Neutrophils, Absolute 7.30 10*3/mm3      Lymphocytes, Absolute 0.80 10*3/mm3      Monocytes, Absolute 0.60 10*3/mm3      Eosinophils, Absolute 0.10 10*3/mm3      Basophils, Absolute 0.00 10*3/mm3      nRBC 0.3 /100 WBC     Blood Culture - Blood, Hand, Right [[id number]] Collected:  10/17/19 0023    Specimen:  Blood from Hand, Right Updated:  10/19/19 0030     Blood Culture No growth at 2 days    Blood Culture - Blood, Arm, Left [[id number]] Collected:  10/17/19 0019    Specimen:  Blood from Arm, Left Updated:  10/19/19 0030     Blood Culture No growth at 2 days    POC Glucose Once [[id number]]  (Abnormal) Collected:  10/18/19 2118    Specimen:  Blood Updated:  10/18/19 2120     Glucose 149 mg/dL      Comment: Serial Number:  [id number]:  [id number]       D-dimer, Quantitative [[id number]]  (Abnormal) Collected:  10/18/19 1946    Specimen:  Blood Updated:  10/18/19 2048     D-Dimer, Quantitative 4.59 MCGFEU/mL     Narrative:       Reference Range  --------------------------------------------------------------------     < 0.50   Negative Predictive Value  0.50-0.59   Indeterminate    >= 0.60   Probable VTE             A very low percentage of patients with DVT may yield D-Dimer results   below the cut-off of 0.50 MCGFEU/mL.  This is known to be more   prevalent in patients with distal DVT.             Results of this test should always be interpreted in conjunction with   the patient's medical history, clinical presentation and other   findings.  Clinical diagnosis should not be based on the result of   [place] D-Dimer alone.    Urinalysis With Culture If Indicated - Urine, Clean Catch [[id number]]  (Abnormal) Collected:  10/18/19 1830    Specimen:  Urine, Clean Catch Updated:  10/18/19 1857     Color, UA Yellow     Appearance, UA Clear     pH, UA 6.5     Specific Gravity, UA 1.010     Glucose, UA Negative     Ketones, UA Negative     Bilirubin, UA Negative     Blood, UA Negative     Protein,  mg/dL (2+)     Leuk Esterase, UA Negative     Nitrite, UA Negative     Urobilinogen, UA 0.2 E.U./dL    Urinalysis, Microscopic Only - Urine, Clean Catch [[id number]]  (Abnormal) Collected:  10/18/19 1830    Specimen:  Urine, Clean Catch Updated:  10/18/19 1857     RBC, UA 0-2 /HPF      WBC, UA 13-20 /HPF      Bacteria, UA None Seen /HPF      Squamous Epithelial Cells, UA 3-6 /HPF      Hyaline Casts, UA 3-6 /LPF      Methodology Automated Microscopy    POC Glucose Once [[id number]]  (Normal) Collected:  10/18/19 1646    Specimen:  Blood Updated:  10/18/19 1649     Glucose 102 mg/dL      Comment: Serial Number: [id number]:  [id number]       POC Glucose Once [[id number]]  (Abnormal) Collected:  10/18/19 1147    Specimen:  Blood Updated:   10/18/19 1156     Glucose 152 mg/dL      Comment: Serial Number: [id number]:  [id number]       POC Glucose Once [[id number]]  (Abnormal) Collected:  10/18/19 0723    Specimen:  Blood Updated:  10/18/19 0728     Glucose 152 mg/dL      Comment: Serial Number: [id number]:  [id number]       Comprehensive Metabolic Panel [[id number]]  (Abnormal) Collected:  10/18/19 0444    Specimen:  Blood Updated:  10/18/19 0641     Glucose 159 mg/dL      BUN 62 mg/dL      Creatinine 2.76 mg/dL      Sodium 142 mmol/L      Potassium 5.1 mmol/L      Chloride 103 mmol/L      CO2 29.0 mmol/L      Calcium 8.5 mg/dL      Total Protein 6.9 g/dL      Albumin 3.40 g/dL      ALT (SGPT) 10 U/L      AST (SGOT) 19 U/L      Alkaline Phosphatase 84 U/L      Total Bilirubin 0.3 mg/dL      eGFR Non African Amer 16 mL/min/1.73      Globulin 3.5 gm/dL      A/G Ratio 1.0 g/dL      BUN/Creatinine Ratio 22.5     Anion Gap 10.0 mmol/L     Narrative:       GFR Normal >60  Chronic Kidney Disease <60  Kidney Failure <15    Iron [[id number]]  (Normal) Collected:  10/18/19 0444    Specimen:  Blood Updated:  10/18/19 0627     Iron 41 mcg/dL     Magnesium [[id number]]  (Normal) Collected:  10/18/19 0444    Specimen:  Blood Updated:  10/18/19 0627     Magnesium 2.1 mg/dL     Phosphorus [[id number]]  (Abnormal) Collected:  10/18/19 0444    Specimen:  Blood Updated:  10/18/19 0627     Phosphorus 5.3 mg/dL     Calcium, Ionized [[id number]]  (Abnormal) Collected:  10/18/19 0444    Specimen:  Blood Updated:  10/18/19 0609     Ionized Calcium 1.14 mmol/L     CBC (No Diff) [[id number]]  (Abnormal) Collected:  10/18/19 0444    Specimen:  Blood Updated:  10/18/19 0556     WBC 9.80 10*3/mm3      RBC 3.16 10*6/mm3      Hemoglobin 8.8 g/dL      Hematocrit 28.2 %      MCV 89.3 fL      MCH 27.8 pg      MCHC 31.1 g/dL      RDW 21.1 %      RDW-SD 66.9 fl      MPV 9.6 fL      Platelets 121 10*3/mm3     POC Glucose Once [[id number]]  (Abnormal) Collected:  10/17/19 2019     Specimen:  Blood Updated:  10/17/19 2021     Glucose 148 mg/dL      Comment: Serial Number: [id number]:  [id number]       Vitamin B12 [[id number]]  (Normal) Collected:  10/17/19 0843    Specimen:  Blood Updated:  10/17/19 1647     Vitamin B-12 566 pg/mL     Folate [[id number]]  (Normal) Collected:  10/17/19 0843    Specimen:  Blood Updated:  10/17/19 1647     Folate 9.74 ng/mL     POC Glucose Once [[id number]]  (Normal) Collected:  10/17/19 1640    Specimen:  Blood Updated:  10/17/19 1645     Glucose 97 mg/dL      Comment: Serial Number: [id number]:  [id number]       POC Glucose Once [[id number]]  (Normal) Collected:  10/17/19 1356    Specimen:  Blood Updated:  10/17/19 1359     Glucose 81 mg/dL      Comment: Serial Number: [id number]:  [id number]       POC Glucose Once [[id number]]  (Abnormal) Collected:  10/17/19 1332    Specimen:  Blood Updated:  10/17/19 1333     Glucose 61 mg/dL      Comment: Serial Number: [id number]:  [id number]       POC Glucose Once [[id number]]  (Abnormal) Collected:  10/17/19 1316    Specimen:  Blood Updated:  10/17/19 1322     Glucose 54 mg/dL      Comment: Serial Number: [id number]:  [id number]       Hemoglobin A1c [[id number]]  (Normal) Collected:  10/17/19 0844    Specimen:  Blood Updated:  10/17/19 1312     Hemoglobin A1C 5.4 %     Narrative:       Hemoglobin A1C Reference Range:    <5.7 %        Normal  5.7-6.4 %     Increased risk for diabetes  > 6.4 %        Diabetes       These guidelines have been recommended by the American Diabetic Association for Hgb A1c.      The following 2010 guidelines have been recommended by the American Diabetes Association for Hemoglobin A1c.    HBA1c 5.7-6.4% Increased risk for future diabetes (pre-diabetes)  HBA1c     >6.4% Diabetes    Comprehensive Metabolic Panel [[id number]]  (Abnormal) Collected:  10/17/19 0843    Specimen:  Blood Updated:  10/17/19 1306     Glucose 46 mg/dL      BUN 62 mg/dL      Creatinine 2.66 mg/dL       Sodium 141 mmol/L      Potassium 4.9 mmol/L      Chloride 104 mmol/L      CO2 26.0 mmol/L      Calcium 8.4 mg/dL      Total Protein 6.6 g/dL      Albumin 3.30 g/dL      ALT (SGPT) 8 U/L      AST (SGOT) 15 U/L      Alkaline Phosphatase 74 U/L      Total Bilirubin 0.3 mg/dL      eGFR Non African Amer 17 mL/min/1.73      Globulin 3.3 gm/dL      A/G Ratio 1.0 g/dL      BUN/Creatinine Ratio 23.3     Anion Gap 15.9 mmol/L     Narrative:       GFR Normal >60  Chronic Kidney Disease <60  Kidney Failure <15    CK [[id number]]  (Normal) Collected:  10/17/19 0843    Specimen:  Blood Updated:  10/17/19 1302     Creatine Kinase 41 U/L     Lipid Panel [[id number]] Collected:  10/17/19 0843    Specimen:  Blood Updated:  10/17/19 1302     Total Cholesterol 119 mg/dL      Triglycerides 109 mg/dL      HDL Cholesterol 43 mg/dL      LDL Cholesterol  54 mg/dL      VLDL Cholesterol 21.8 mg/dL      LDL/HDL Ratio 1.26    Narrative:       Cholesterol Reference Ranges  (U.S. Department of Health and Human Services ATP III Classifications)    Desirable          <200 mg/dL  Borderline High    200-239 mg/dL  High Risk          >240 mg/dL      Triglyceride Reference Ranges  (U.S. Department of Health and Human Services ATP III Classifications)    Normal           <150 mg/dL  Borderline High  150-199 mg/dL  High             200-499 mg/dL  Very High        >500 mg/dL    HDL Reference Ranges  (U.S. Department of Health and Human Services ATP III Classifcations)    Low     <40 mg/dl (major risk factor for CHD)  High    >60 mg/dl ('negative' risk factor for CHD)        LDL Reference Ranges  (U.S. Department of Health and Human Services ATP III Classifcations)    Optimal          <100 mg/dL  Near Optimal     100-129 mg/dL  Borderline High  130-159 mg/dL  High             160-189 mg/dL  Very High        >189 mg/dL    Magnesium [[id number]]  (Normal) Collected:  10/17/19 0843    Specimen:  Blood Updated:  10/17/19 1302     Magnesium 2.2 mg/dL      "Phosphorus [[id number]]  (Abnormal) Collected:  10/17/19 0843    Specimen:  Blood Updated:  10/17/19 1302     Phosphorus 5.5 mg/dL     C-reactive Protein [[id number]]  (Abnormal) Collected:  10/17/19 0843    Specimen:  Blood Updated:  10/17/19 1302     C-Reactive Protein 2.55 mg/dL     Procalcitonin [[id number]]  (Normal) Collected:  10/17/19 0843    Specimen:  Blood Updated:  10/17/19 1256     Procalcitonin 0.11 ng/mL     Narrative:       As a Marker for Sepsis (Non-Neonates):   1. <0.5 ng/mL represents a low risk of severe sepsis and/or septic shock.  1. >2 ng/mL represents a high risk of severe sepsis and/or septic shock.    As a Marker for Lower Respiratory Tract Infections that require antibiotic therapy:  PCT on Admission     Antibiotic Therapy             6-12 Hrs later  > 0.5                Strongly Recommended            >0.25 - <0.5         Recommended  0.1 - 0.25           Discouraged                   Remeasure/reassess PCT  <0.1                 Strongly Discouraged          Remeasure/reassess PCT      As 28 day mortality risk marker: \"Change in Procalcitonin Result\" (> 80 % or <=80 %) if Day 0 (or Day 1) and Day 4 values are available. Refer to http://www.[employer]-pct-calculator.com/   Change in PCT <=80 %   A decrease of PCT levels below or equal to 80 % defines a positive change in PCT test result representing a higher risk for 28-day all-cause mortality of patients diagnosed with severe sepsis or septic shock.  Change in PCT > 80 %   A decrease of PCT levels of more than 80 % defines a negative change in PCT result representing a lower risk for 28-day all-cause mortality of patients diagnosed with severe sepsis or septic shock.                Ferritin [[id number]]  (Normal) Collected:  10/17/19 0843    Specimen:  Blood Updated:  10/17/19 1256     Ferritin 90.76 ng/mL     TSH [[id number]]  (Abnormal) Collected:  10/17/19 0843    Specimen:  Blood Updated:  10/17/19 1256     TSH 10.650 uIU/mL      Comment: " Results may be falsely decreased if patient taking Biotin.       BNP [[id number]]  (Abnormal) Collected:  10/17/19 0843    Specimen:  Blood Updated:  10/17/19 1256     proBNP 7,805.0 pg/mL     Narrative:       Among patients with dyspnea, NT-proBNP is highly sensitive for the detection of acute congestive heart failure. In addition NT-proBNP of <300 pg/ml effectively rules out acute congestive heart failure with 99% negative predictive value.    Troponin [[id number]]  (Abnormal) Collected:  10/17/19 0843    Specimen:  Blood Updated:  10/17/19 1211     Troponin T 0.037 ng/mL     Narrative:       Troponin T Reference Range:  <= 0.03 ng/mL-   Negative for AMI  >0.03 ng/mL-     Abnormal for myocardial necrosis.  Clinicians would have to utilize clinical acumen, EKG, Troponin and serial changes to determine if it is an Acute Myocardial Infarction or myocardial injury due to an underlying chronic condition.     Uric Acid [[id number]]  (Abnormal) Collected:  10/17/19 0843    Specimen:  Blood Updated:  10/17/19 1147     Uric Acid 5.8 mg/dL     POC Glucose Once [[id number]]  (Normal) Collected:  10/17/19 1135    Specimen:  Blood Updated:  10/17/19 1140     Glucose 84 mg/dL      Comment: Serial Number: [id number]:  [id number]       Lactic Acid, Plasma [[id number]]  (Normal) Collected:  10/17/19 0844    Specimen:  Blood Updated:  10/17/19 1119     Lactate 1.0 mmol/L     Sedimentation Rate [[id number]]  (Abnormal) Collected:  10/17/19 0844    Specimen:  Blood Updated:  10/17/19 1108     Sed Rate 69 mm/hr     CBC Auto Differential [[id number]]  (Abnormal) Collected:  10/17/19 0844    Specimen:  Blood Updated:  10/17/19 1011     WBC 7.00 10*3/mm3      RBC 3.16 10*6/mm3      Hemoglobin 8.6 g/dL      Hematocrit 28.6 %      MCV 90.4 fL      MCH 27.3 pg      MCHC 30.2 g/dL      RDW 21.8 %      RDW-SD 70.9 fl      MPV 9.2 fL      Platelets 110 10*3/mm3      Neutrophil % 71.4 %      Lymphocyte % 15.9 %      Monocyte % 7.4 %       Eosinophil % 4.3 %      Basophil % 1.0 %      Neutrophils, Absolute 5.00 10*3/mm3      Lymphocytes, Absolute 1.10 10*3/mm3      Monocytes, Absolute 0.50 10*3/mm3      Eosinophils, Absolute 0.30 10*3/mm3      Basophils, Absolute 0.10 10*3/mm3      nRBC 0.5 /100 WBC     POC Glucose Once [[id number]]  (Normal) Collected:  10/17/19 0738    Specimen:  Blood Updated:  10/17/19 0742     Glucose 71 mg/dL      Comment: Serial Number: [id number]:  [id number]       Urinalysis With Culture If Indicated - Urine, Catheter In/Out [[id number]]  (Abnormal) Collected:  10/17/19 0228    Specimen:  Urine, Catheter In/Out Updated:  10/17/19 0304     Color, UA Yellow     Appearance, UA Cloudy     Comment: Result checked         pH, UA 5.5     Specific Gravity, UA 1.012     Glucose, UA Negative     Ketones, UA Negative     Bilirubin, UA Negative     Blood, UA Negative     Protein, UA >=300 mg/dL (3+)     Leuk Esterase, UA Negative     Nitrite, UA Negative     Urobilinogen, UA 0.2 E.U./dL    Urinalysis, Microscopic Only - Urine, Catheter In/Out [[id number]]  (Abnormal) Collected:  10/17/19 0228    Specimen:  Urine, Catheter In/Out Updated:  10/17/19 0304     RBC, UA 0-2 /HPF      WBC, UA 0-2 /HPF      Bacteria, UA None Seen /HPF      Squamous Epithelial Cells, UA 0-2 /HPF      Hyaline Casts, UA 0-2 /LPF      Methodology Manual Light Microscopy    Respiratory Panel, PCR - Swab, Nasopharynx [[id number]]  (Normal) Collected:  10/17/19 0025    Specimen:  Swab from Nasopharynx Updated:  10/17/19 0222     ADENOVIRUS, PCR Not Detected     Coronavirus 229E Not Detected     Coronavirus HKU1 Not Detected     Coronavirus NL63 Not Detected     Coronavirus OC43 Not Detected     Human Metapneumovirus Not Detected     Human Rhinovirus/Enterovirus Not Detected     Influenza B PCR Not Detected     Parainfluenza Virus 1 Not Detected     Parainfluenza Virus 2 Not Detected     Parainfluenza Virus 3 Not Detected     Parainfluenza Virus 4 Not Detected      Bordetella pertussis pcr Not Detected     Influenza A H1 2009 PCR Not Detected     Chlamydophila pneumoniae PCR Not Detected     Mycoplasma pneumo by PCR Not Detected     Influenza A PCR Not Detected     Influenza A H3 Not Detected     Influenza A H1 Not Detected     RSV, PCR Not Detected    Extra Tubes [[id number]] Collected:  10/17/19 0019    Specimen:  Blood, Venous Line Updated:  10/17/19 0130    Narrative:       The following orders were created for panel order Extra Tubes.  Procedure                               Abnormality         Status                     ---------                               -----------         ------                     [facility] Top - SST[[id number]]                                   Final result                 Please view results for these tests on the individual orders.    Gold [facility] - SST [[id number]] Collected:  10/17/19 0019    Specimen:  Blood Updated:  10/17/19 0130     Extra Tube Hold for add-ons.     Comment: Auto resulted.       Troponin [[id number]]  (Abnormal) Collected:  10/17/19 0019    Specimen:  Blood Updated:  10/17/19 0059     Troponin T 0.043 ng/mL     Narrative:       Troponin T Reference Range:  <= 0.03 ng/mL-   Negative for AMI  >0.03 ng/mL-     Abnormal for myocardial necrosis.  Clinicians would have to utilize clinical acumen, EKG, Troponin and serial changes to determine if it is an Acute Myocardial Infarction or myocardial injury due to an underlying chronic condition.     Comprehensive Metabolic Panel [[id number]]  (Abnormal) Collected:  10/17/19 0019    Specimen:  Blood Updated:  10/17/19 0051     Glucose 122 mg/dL      BUN 61 mg/dL      Creatinine 2.72 mg/dL      Sodium 141 mmol/L      Potassium 5.2 mmol/L      Chloride 103 mmol/L      CO2 29.0 mmol/L      Calcium 8.6 mg/dL      Total Protein 6.8 g/dL      Albumin 3.50 g/dL      ALT (SGPT) 9 U/L      AST (SGOT) 18 U/L      Alkaline Phosphatase 75 U/L      Total Bilirubin 0.3 mg/dL      eGFR Non  Amer 17  mL/min/1.73      Globulin 3.3 gm/dL      A/G Ratio 1.1 g/dL      BUN/Creatinine Ratio 22.4     Anion Gap 14.2 mmol/L     Narrative:       GFR Normal >60  Chronic Kidney Disease <60  Kidney Failure <15    BNP [[id number]]  (Abnormal) Collected:  10/17/19 0019    Specimen:  Blood Updated:  10/17/19 0050     proBNP 8,150.0 pg/mL     Narrative:       Among patients with dyspnea, NT-proBNP is highly sensitive for the detection of acute congestive heart failure. In addition NT-proBNP of <300 pg/ml effectively rules out acute congestive heart failure with 99% negative predictive value.    Protime-INR [[id number]]  (Normal) Collected:  10/17/19 0019    Specimen:  Blood Updated:  10/17/19 0033     Protime 10.3 Seconds      INR 0.98    aPTT [[id number]]  (Normal) Collected:  10/17/19 0019    Specimen:  Blood Updated:  10/17/19 0033     PTT 25.3 seconds     CBC & Differential [[id number]] Collected:  10/17/19 0019    Specimen:  Blood Updated:  10/17/19 0026    Narrative:       The following orders were created for panel order CBC & Differential.  Procedure                               Abnormality         Status                     ---------                               -----------         ------                     CBC Auto Differential[[id number]]        Abnormal            Final result                 Please view results for these tests on the individual orders.    CBC Auto Differential [[id number]]  (Abnormal) Collected:  10/17/19 0019    Specimen:  Blood Updated:  10/17/19 0026     WBC 9.50 10*3/mm3      RBC 3.11 10*6/mm3      Hemoglobin 8.6 g/dL      Hematocrit 27.7 %      MCV 89.0 fL      MCH 27.5 pg      MCHC 30.9 g/dL      RDW 21.0 %      RDW-SD 66.5 fl      MPV 8.6 fL      Platelets 148 10*3/mm3      Neutrophil % 74.6 %      Lymphocyte % 13.7 %      Monocyte % 6.9 %      Eosinophil % 3.7 %      Basophil % 1.1 %      Neutrophils, Absolute 7.10 10*3/mm3      Lymphocytes, Absolute 1.30 10*3/mm3      Monocytes, Absolute 0.70  10*3/mm3      Eosinophils, Absolute 0.40 10*3/mm3      Basophils, Absolute 0.10 10*3/mm3      nRBC 0.3 /100 WBC           Imaging Results (last 24 hours)     Procedure Component Value Units Date/Time    XR Chest PA & Lateral [[id number]] Collected:  10/19/19 0950     Updated:  10/19/19 0953    Narrative:          DATE OF EXAM:   10/19/2019 9:44 AM     PROCEDURE:   XR CHEST PA AND LATERAL-     INDICATIONS:   elevated D-Dimer 4.59. Not appropriate for CT PE d/t elevated  creatinine; I50.9-Heart failure, unspecified; R79.89-Other specified  abnormal findings of blood chemistry     COMPARISON:  10/16/2019     TECHNIQUE:   [PA and lateral views of the chest were obtained.]     FINDINGS:   There are small-to-moderate bilateral pleural effusions with bibasilar  airspace disease likely compressive atelectasis. Cardiomegaly and  findings of pulmonary edema again noted. No pneumothorax.        Impression:       1. Cardiomegaly and pulmonary edema, unchanged.  2. No change in small to moderate bilateral pleural effusions with  bibasilar airspace disease.     Electronically Signed By-Vasile Ludwig On:10/19/2019 9:51 AM  This report was finalized on [id number] by  Vasile Ludwig, .    CT Chest Without Contrast [[id number]] Collected:  10/19/19 0901     Updated:  10/19/19 0912    Narrative:          DATE OF EXAM:  10/19/2019 7:47 AM     PROCEDURE:  CT CHEST WO CONTRAST-     INDICATIONS:   Shortness of breath; I50.9-Heart failure, unspecified; R79.89-Other  specified abnormal findings of blood chemistry     COMPARISON:   Portable chest radiograph 10/16/2019     TECHNIQUE:  Routine transaxial slices were obtained through the chest without the  administration of intravenous contrast. Reconstructed coronal and  sagittal images were also obtained. Automated exposure control and  iterative construction methods were used.      FINDINGS:  Visualized lower neck without acute abnormality. Heart mildly enlarged.  Coronary atherosclerotic  calcifications noted. No pericardial effusion.  There are several mildly enlarged mediastinal lymph nodes for example  precarinal node measuring 15 mm on image 35 and AP window node measuring  12 mm on image 43. No axillary adenopathy.     The trachea and mainstem bronchi are patent. There are small to moderate  bilateral pleural effusions with compressive lower lobe airspace disease  likely relating to atelectasis. Superimposed pneumonia difficult to  exclude in the right lower lobe given air bronchograms. There is mild  interlobular septal thickening and patchy groundglass opacities within  the left and right upper lobe which may relate to pulmonary edema.     Visualized portions of the liver, spleen, adrenal glands, and pancreas  are without acute abnormality. No free fluid in the upper abdomen.  Multilevel thoracic disc disease with bridging thoracic syndesmophytes.  Negative for acute fracture.       Impression:       1. Cardiomegaly with mild interlobular septal thickening and scattered  groundglass opacities most consistent with pulmonary edema.  2. Small-to-moderate bilateral pleural effusions with lower lobe  airspace disease likely compressive atelectasis, superimposed pneumonia  difficult to exclude at the right lower lobe.  3. Mildly enlarged mediastinal lymph nodes several of which demonstrate  normal fatty ratna may relate to reactive adenopathy.     Electronically Signed By-Vasile Ludwig On:10/19/2019 9:09 AM  This report was finalized on [id number] by  Vasile Ludwig, .          Blood Culture   Date Value Ref Range Status   10/17/2019 No growth at 2 days  Preliminary   10/17/2019 No growth at 2 days  Preliminary                 Urine Culture   Date Value Ref Range Status   10/18/2019 >100,000 CFU/mL Enterococcus species (A)  Preliminary              Xrays, labs reviewed personally by physician.    ECG/EMG Results (most recent)     None            Medication Review:   I have reviewed the patient's  current medication list      Current Facility-Administered Medications:   •  acetaminophen (TYLENOL) tablet 650 mg, 650 mg, Oral, Q4H PRN **OR** acetaminophen (TYLENOL) 160 MG/5ML solution 650 mg, 650 mg, Oral, Q4H PRN **OR** acetaminophen (TYLENOL) suppository 650 mg, 650 mg, Rectal, Q4H PRN, Ben Carlisle MD  •  ammonium lactate (AMLACTIN) 12 % cream 1 application, 1 application, Topical, Daily, Ben Carlisle MD, 1 application at 10/19/19 1043  •  aspirin chewable tablet 81 mg, 81 mg, Oral, Daily, Ben Carlisle MD, 81 mg at 10/19/19 1032  •  atorvastatin (LIPITOR) tablet 10 mg, 10 mg, Oral, Daily, Ben Carlisle MD, 10 mg at 10/19/19 1031  •  bisacodyl (DULCOLAX) EC tablet 5 mg, 5 mg, Oral, Every Other Day, Ben Carlisle MD, 5 mg at 10/19/19 1033  •  calcium gluconate 1g/50ml 0.675% NaCl IV SOLN, 1 g, Intravenous, PRN **AND** calcium gluconate 2 g/100 mL NS IVPB, 2 g, Intravenous, PRN **AND** Calcium, , , PRN, Ben Carlisle MD  •  cefTRIAXone (ROCEPHIN) 1 g in sodium chloride 0.9 % 100 mL IVPB, 1 g, Intravenous, Q24H, Ben Carlisle MD, Last Rate: 200 mL/hr at 10/18/19 1312, 1 g at 10/18/19 1312  •  cholecalciferol (VITAMIN D3) tablet 1,000 Units, 1,000 Units, Oral, Daily, Ben Carlisle MD, 1,000 Units at 10/19/19 1031  •  dextrose (D50W) 25 g/ 50mL Intravenous Solution 25 g, 25 g, Intravenous, Q15 Min PRN, Ben Carlisle MD  •  dextrose (GLUTOSE) oral gel 15 g, 15 g, Oral, Q15 Min PRN, Ben Carlisle MD  •  docusate sodium (COLACE) capsule 100 mg, 100 mg, Oral, BID, Ben Carlisle MD, 100 mg at 10/19/19 1030  •  docusate sodium (COLACE) capsule 100 mg, 100 mg, Oral, BID PRN, Ben Carlisle MD  •  doxycycline (ADOXA) tablet 100 mg, 100 mg, Oral, Q12H, Ben Carlisle MD, 100 mg at 10/19/19 1031  •  febuxostat (ULORIC) tablet 40 mg, 40 mg, Oral, Daily, Ben Carlisle MD, 40 mg at 10/19/19 1033  •  ferrous sulfate EC tablet 324 mg, 324 mg, Oral, Daily With Breakfast, Ben Carlisle MD, 324 mg at  10/19/19 1030  •  furosemide (LASIX) injection 40 mg, 40 mg, Intravenous, Q8H, Diana Sesay MD, 40 mg at 10/19/19 1030  •  gabapentin (NEURONTIN) capsule 300 mg, 300 mg, Oral, Nightly, Ben Carlisle MD, 300 mg at 10/18/19 2049  •  glucagon (human recombinant) (GLUCAGEN DIAGNOSTIC) injection 1 mg, 1 mg, Subcutaneous, Q15 Min PRN, Ben Carlisle MD  •  hydrALAZINE (APRESOLINE) tablet 25 mg, 25 mg, Oral, TID, Ben Carlisle MD, 25 mg at 10/19/19 1032  •  insulin glargine (LANTUS) injection 30 Units, 30 Units, Subcutaneous, Nightly, Ben Carlisle MD, 30 Units at 10/18/19 2100  •  insulin lispro (humaLOG) injection 0-9 Units, 0-9 Units, Subcutaneous, 4x Daily With Meals & Nightly, Ben Carlisle MD, 2 Units at 10/18/19 1313  •  isosorbide mononitrate (IMDUR) 24 hr tablet 120 mg, 120 mg, Oral, Daily, Ben Carlisle MD, 120 mg at 10/19/19 1031  •  ketamine (KETALAR) 20 mg in sodium chloride 0.9 % 100 mL infusion, 20 mg, Intravenous, Daily PRN **AND** Ketamine Vital Signs & Assessment, , , Per Order Details, Ben Carlisle MD  •  ketotifen (ZADITOR) 0.025 % ophthalmic solution 1 drop, 1 drop, Both Eyes, BID, Ben Carlisle MD, 1 drop at 10/19/19 1032  •  levothyroxine (SYNTHROID, LEVOTHROID) tablet 112 mcg, 112 mcg, Oral, Daily, Ben Carlisle MD, 112 mcg at 10/19/19 1031  •  linagliptin (TRADJENTA) tablet 5 mg, 5 mg, Oral, Daily, Ben Carlisle MD, 5 mg at 10/19/19 1031  •  magnesium hydroxide (MILK OF MAGNESIA) suspension 2400 mg/10mL 10 mL, 10 mL, Oral, Daily PRN, Ben Carlisle MD  •  Magnesium Sulfate 2 gram Bolus, followed by 8 gram infusion (total Mg dose 10 grams)- Mg less than or equal to 1mg/dL, 2 g, Intravenous, PRN **OR** Magnesium Sulfate 2 gram / 50mL Infusion (GIVE X 3 BAGS TO EQUAL 6GM TOTAL DOSE) - Mg 1.1 - 1.5 mg/dl, 2 g, Intravenous, PRN **OR** Magnesium Sulfate 4 gram infusion- Mg 1.6-1.9 mg/dL, 4 g, Intravenous, PRN, Ben Carlisle MD  •  melatonin tablet 5 mg, 5 mg, Oral,  Nightly PRN, Ben Carlisle MD  •  methadone (DOLOPHINE) tablet 10 mg, 10 mg, Oral, TID, Ben Carlisle MD, 10 mg at 10/19/19 1033  •  metoprolol tartrate (LOPRESSOR) tablet 25 mg, 25 mg, Oral, Q12H, Ben Carlisle MD, 25 mg at 10/19/19 1033  •  miconazole (MICOTIN) 2 % powder, , Topical, Q12H, Nellie Forrester APRN  •  nitroglycerin (NITROSTAT) SL tablet 0.4 mg, 0.4 mg, Sublingual, Q5 Min PRN, Ben Carlisle MD  •  ondansetron (ZOFRAN) injection 4 mg, 4 mg, Intravenous, Q6H PRN, Ben Carlisle MD  •  ondansetron (ZOFRAN) tablet 4 mg, 4 mg, Oral, Q6H PRN, Ben Carlisle MD  •  oxybutynin XL (DITROPAN-XL) 24 hr tablet 5 mg, 5 mg, Oral, Daily, Ben Carlisle MD, 5 mg at 10/19/19 1030  •  pantoprazole (PROTONIX) EC tablet 40 mg, 40 mg, Oral, Daily, Ben Carlisle MD, 40 mg at 10/19/19 1033  •  polyethylene glycol (MIRALAX) powder 17 g, 17 g, Oral, Daily, Ben Carlisle MD, 17 g at 10/19/19 1032  •  potassium chloride (K-DUR,KLOR-CON) CR tablet 40 mEq, 40 mEq, Oral, PRN, Ben Carlisle MD  •  potassium chloride (KLOR-CON) packet 40 mEq, 40 mEq, Oral, PRN, Ben Carlisle MD  •  sertraline (ZOLOFT) tablet 25 mg, 25 mg, Oral, Daily, Ben Carlisle MD, 25 mg at 10/19/19 1032  •  sodium chloride 0.9 % flush 10 mL, 10 mL, Intravenous, Q12H, Ben Carlisle MD, 10 mL at 10/19/19 1044  •  sodium chloride 0.9 % flush 10 mL, 10 mL, Intravenous, PRN, Ben Carlisle MD  •  vitamin C (ASCORBIC ACID) tablet 1,000 mg, 1,000 mg, Oral, Daily, Ben Carlisle MD, 1,000 mg at 10/19/19 1033  •  zinc oxide 20 % ointment 1 application, 1 application, Topical, BID, Ben Carlisle MD, 1 application at 10/19/19 1044      Patient Active Problem List   Diagnosis   • Coronary artery disease   • Depression   • Diabetes mellitus type II, controlled (CMS/[facility])   • Encounter for screening   • Essential hypertension   • Gastroesophageal reflux disease   • Gout   • Hip pain, right   • Hyperlipidemia   • Need for prophylactic vaccination  against Streptococcus pneumoniae (pneumococcus)   • Proteinuria   • Pancytopenia (CMS/HCC)   • Other dorsalgia   • Status post coronary artery stent placement   • Ureteral obstruction   • Urinary tract infection   • Pressure injury of sacral region, stage 2 (CMS/HCC)         Assessment and Plan:    #1 cough congestion shortness of breath with abnormal chest x-ray possible pneumonia and we will continue Rocephin and doxycycline as those are started at nursing home.  - CT chest without contrast showed Cardiomegaly with mild interlobular septal thickening and scattered  groundglass opacities most consistent with pulmonary edema.Small-to-moderate bilateral pleural effusions with lower lobe airspace disease likely compressive atelectasis, superimposed pneumonia  difficult to exclude at the right lower lobe.Mildly enlarged mediastinal lymph nodes several of which demonstrate  normal fatty ratna may relate to reactive adenopathy.  -Patient is receiving IV Lasix-continue   - Continue Rocephin and doxycycline as those are started at nursing home.  -Patient has d-dimer positive, VQ scan pending         #2 Chronic diastolic CHF with EF 55% about few months ago on echocardiogram as well as patient has high proBNP and we will continue current home medications and at this time patient does not seem to be in acute CHF exacerbation.    -CT chest did show pulmonary edema and small to moderate bilateral pleural effusions.   -Continue IV Lasix and beta-blocker     #3 chronic kidney disease stage IV and patient will be seen by nephrology for further management  -Creatinine 2.6, BUN 66 -monitor  -Nephrology is managing patient clinical problem     #4 anemia of chronic kidney disease versus possible GI bleed and patient has recently been transfused  -GI followed patient and has signed off and does not plan to proceed with endoscopy at this time due to family request  -Hemoglobin 8.4, monitor     #5 bladder incontinence patient be  continued on home medications     #6 diabetes mellitus type 2 patient is on linagliptin/Lantus insulin and will check Accu-Cheks and use sliding scale insulin     #7 hypertension patient is on Lopressor as well as hydralazine and will continue it     #8 diabetic neuropathy and will continue gabapentin     #9 chronic gout patient is on Uloric continue     #10 GERD patient is on PPI and will continue it     #11 chronic methadone therapy, not sure why she is on it but will continue at this time since it was being given at the nursing home.     #12 hyperlipidemia and patient is on statin therapy and will continue it     #13 DVT prophylaxis and I will hold off on using Lovenox since patient has low hemoglobin and will be she is having GI bleed and has required blood transfusion recently.     #14 hypothyroidism continue levothyroxine     #15 depression patient is on Zoloft will continue it                Plan for disposition:    Possible to rehab tomorrow pending VQ scan results

## 2019-10-20 NOTE — PLAN OF CARE
Problem: Wound (Includes Pressure Injury) (Adult)  Goal: Signs and Symptoms of Listed Potential Problems Will be Absent, Minimized or Managed (Wound)  Outcome: Ongoing (interventions implemented as appropriate)         Need for prophylactic measure Weak

## 2019-10-20 NOTE — PROGRESS NOTES
[facility] Daily Progress Note      Hospitalist Team  LOS 3 days      Patient Care Team:  Chris Remy MD as PCP - General (Geriatric Medicine)  Diana Sesay MD as PCP - Claims Attributed  Adry Bear MD as PCP - Family Medicine        Chief Complaint / Subjective  Chief Complaint   Patient presents with   • Abnormal Lab       Brief Synopsis of Hospital Course/HPI    Patient is a 84-year-old female was sent from the nursing home as she had some labs done including BNP and troponin and they came out to be abnormally high and patient was sent to the ER for further evaluation.  I have seen the patient today in the ER and also spoke to the ER physician and I have reviewed the records.  Patient has chronic kidney disease and does see a nephrologist and they have been titrating her medications.  Patient also recently had cough congestion generalized weakness and patient was started on Rocephin and doxycycline at the nursing home.  Patient at this time denies any complaints whatsoever except just feeling fatigued and tired.  She denies any fever/chills/nausea/vomiting/diarrhea or any other symptoms.     10/18/2019 GI saw patient for possible blood loss, but patient's family was not currently willing to consider an endoscopy evaluation unless absolutely needed.  She was started on PPI and MiraLAX twice daily.  Patient was to be discharged.  Patient began to have short of breath and nurse had increase oxygen to 5 L nasal cannula.  D-dimer was checked and came back elevated.  CT chest without contrast and VQ scan ordered.     10/19/2019 Patient has complaints today of 10 out of 10 back pain.  She does appear ill today.  VQ scan is pending.  CT chest without contrast came back and showed 1. Cardiomegaly with mild interlobular septal thickening and scattered groundglass opacities most consistent with pulmonary edema.Small-to-moderate bilateral  "pleural effusions with lower lobe airspace disease likely compressive atelectasis, superimposed pneumonia difficult to exclude at the right lower lobe.Mildly enlarged mediastinal lymph nodes several of which demonstrate normal fatty ratna may relate to reactive adenopathy.  Patient is receiving receiving IV Lasix.   Patient is currently on 2 L nasal cannula.    October 20, 2019 patient seen and examined.  Daughter at the bedside and she does not have any new concerns.  I spoke to nursing at the bedside.  Also nephrology want to keep the patient in the 24 hours for IV diuresis.  Patient slept really well and clinically improving.    ROS    No family history on file.    Past Medical History:   Diagnosis Date   • CHF (congestive heart failure) (CMS/HCC)    • Coronary artery disease    • Diabetes mellitus (CMS/HCC)    • Disease of thyroid gland    • Elevated cholesterol    • GERD (gastroesophageal reflux disease)    • History of transfusion    • Hyperlipidemia    • Hypertension    • PONV (postoperative nausea and vomiting)        Social History     Socioeconomic History   • Marital status:      Spouse name: Not on file   • Number of children: Not on file   • Years of education: Not on file   • Highest education level: Not on file   Tobacco Use   • Smoking status: Former Smoker   Substance and Sexual Activity   • Alcohol use: No     Frequency: Never   • Drug use: No   • Sexual activity: Defer           Objective      Vital Signs  Temp:  [97.7 °F (36.5 °C)-97.9 °F (36.6 °C)] 97.7 °F (36.5 °C)  Heart Rate:  [56-64] 56  Resp:  [17-18] 18  BP: (128-163)/(70-71) 128/71  Oxygen Therapy  SpO2: 97 %  Pulse Oximetry Type: Continuous  Device (Oxygen Therapy): nasal cannula  Flow (L/min): 4  Flowsheet Rows      First Filed Value   Admission Height  152.4 cm (60\") Documented at 10/16/2019 2312   Admission Weight  101 kg (222 lb) Documented at 10/16/2019 2312        Intake & Output (last 3 days)       10/17 0701 - 10/18 0700 " 10/18 0701 - 10/19 0700 10/19 0701 - 10/20 0700 10/20 0701 - 10/21 0700    P.O.  520  520    Total Intake(mL/kg)  520 (5.3)  520 (5.3)    Urine (mL/kg/hr) 3 (0) 202 (0.1)      Total Output 3 202      Net -3 +318  +520            Urine Unmeasured Occurrence 1 x 1 x 5 x         Lines, Drains & Airways    Active LDAs     Name:   Placement date:   Placement time:   Site:   Days:    Peripheral IV 10/18/19 0517 Left;Posterior Forearm   10/18/19    0517    Forearm   2                  Physical Exam:    Physical Exam   Constitutional: She appears well-developed and well-nourished.   HENT:   Head: Normocephalic and atraumatic.   Eyes: Pupils are equal, round, and reactive to light.   Neck: Normal range of motion.   Cardiovascular: Normal rate and regular rhythm.   Pulmonary/Chest: Effort normal and breath sounds normal.   Abdominal: Soft. Bowel sounds are normal.   Neurological: She is alert.   Skin: Skin is warm and dry.   Nursing note and vitals reviewed.        Procedures:      Results Review:     I reviewed the patient's new clinical results.      Results from last 7 days   Lab Units 10/20/19  0404 10/19/19  0642 10/18/19  0444   WBC 10*3/mm3 8.10 9.00 9.80   HEMOGLOBIN g/dL 9.0* 8.4* 8.8*   HEMATOCRIT % 27.8* 26.7* 28.2*   PLATELETS 10*3/mm3 126* 127* 121*     Results from last 7 days   Lab Units 10/20/19  0404 10/19/19  0926 10/19/19  0642 10/18/19  0444 10/17/19  0843   SODIUM mmol/L 143 142 142 142 141   POTASSIUM mmol/L 4.2 4.3 4.5 5.1 4.9   CHLORIDE mmol/L 101 101 100 103 104   CO2 mmol/L 33.0* 29.0 30.0* 29.0 26.0   BUN mg/dL 67* 66* 66* 62* 62*   CREATININE mg/dL 2.59* 2.61* 2.64* 2.76* 2.66*   CALCIUM mg/dL 8.7 8.8 8.6 8.5* 8.4*   BILIRUBIN mg/dL  --   --  0.5 0.3 0.3   ALK PHOS U/L  --   --  79 84 74   ALT (SGPT) U/L  --   --  8 10 8   AST (SGOT) U/L  --   --  17 19 15   GLUCOSE mg/dL 105* 142* 122* 159* 46*     Results from last 7 days   Lab Units 10/19/19  0642 10/18/19  0444 10/17/19  0843   MAGNESIUM mg/dL  1.9 2.1 2.2     Lab Results   Component Value Date    CALCIUM 8.7 10/20/2019    PHOS 4.4 10/20/2019     No results found for: HGBA1C  Results from last 7 days   Lab Units 10/17/19  0019   INR  0.98               Microbiology Results (last 10 days)     Procedure Component Value - Date/Time    Urine Culture - Urine, Urine, Clean Catch [[id number]]  (Abnormal)  (Susceptibility) Collected:  10/18/19 1830    Lab Status:  Final result Specimen:  Urine, Clean Catch Updated:  10/20/19 0952     Urine Culture >100,000 CFU/mL Enterococcus faecium, VRE     Comment:   Vancomycin Resistant Enterococcus species. Patient may be an isolation risk.       Susceptibility      Enterococcus faecium, VRE     DEB     Ampicillin Resistant     Levofloxacin Resistant     Nitrofurantoin Resistant     Tetracycline Resistant     Vancomycin Resistant                    Respiratory Panel, PCR - Swab, Nasopharynx [[id number]]  (Normal) Collected:  10/17/19 0025    Lab Status:  Final result Specimen:  Swab from Nasopharynx Updated:  10/17/19 0222     ADENOVIRUS, PCR Not Detected     Coronavirus 229E Not Detected     Coronavirus HKU1 Not Detected     Coronavirus NL63 Not Detected     Coronavirus OC43 Not Detected     Human Metapneumovirus Not Detected     Human Rhinovirus/Enterovirus Not Detected     Influenza B PCR Not Detected     Parainfluenza Virus 1 Not Detected     Parainfluenza Virus 2 Not Detected     Parainfluenza Virus 3 Not Detected     Parainfluenza Virus 4 Not Detected     Bordetella pertussis pcr Not Detected     Influenza A H1 2009 PCR Not Detected     Chlamydophila pneumoniae PCR Not Detected     Mycoplasma pneumo by PCR Not Detected     Influenza A PCR Not Detected     Influenza A H3 Not Detected     Influenza A H1 Not Detected     RSV, PCR Not Detected    Blood Culture - Blood, Hand, Right [[id number]] Collected:  10/17/19 0023    Lab Status:  Preliminary result Specimen:  Blood from Hand, Right Updated:  10/20/19 0030     Blood  Culture No growth at 3 days    Blood Culture - Blood, Arm, Left [[id number]] Collected:  10/17/19 0019    Lab Status:  Preliminary result Specimen:  Blood from Arm, Left Updated:  10/20/19 0030     Blood Culture No growth at 3 days          ECG/EMG Results (most recent)     None          Results for orders placed during the hospital encounter of 19   Duplex Venous Lower Extremity - Bilateral CAR    Narrative                   Lower Extremity Venous Report                          [facility]                         Vascular Laboratory                            [address] [place] [address]    Name: NIGEL QUINTANILLA              Study Date: 2019 01:46 PM  MRN: [id number]                       Patient Location: OU  : 1935 (M/d/yyyy)           Gender: Female  Age: 83 yrs                          Account#: [id number]  Reason For Study: edema      Procedure  Venous study was carried out in bilateral lower extremities. Gray scale, color  flow, and spectral doppler images were obtained. Images were obtained in  transverse and longitudinal views.    Right Lower Extremity Venous  On the right side the patient has patent common femoral, femoral, and  popliteal veins. The main veins are easily compressible. Femoral vein was  mapped in its entirety through the course of the thigh. It is patent and  compresses well. The popliteal vein is patent along with its tributaries and  compresses well with no evidence of any filling defect. Augmentation test is  positive. Respiratory waves are biphasic. Distal veins are patent. The right  greater and small saphenous veins are patent with good compressibility. There  is fluid retention noted, suggestive of edema. There is a cystic fluid  retention behind the right knee which could be a Baker's cyst.    Left Lower Extremity Venous  On the left side the patient has patent common femoral, femoral, and popliteal  veins. The main veins  compress well. Femoral vein was mapped in its entirety  through the course of the thigh. It is patent and compresses well. The  popliteal vein is patent along with its tributaries and compresses well with  no evidence of any filling defect. Augmentation test is positive. Respiratory  waves are biphasic. Distal veins are patent. The left greater and small  saphenous veins are patent with good compressibility. There is fluid retention  noted, suggestive of edema. There is a lymph node noted in the left groin.      Interpretation Summary  No evidence of any deep vein thrombosis or superficial vein thrombosis.  _______________________________________________________________________________    Electronically signed by: Alejandro Escobar MD  on 2019 04:36 PM    Ordering Physician: ELAINE STUBBS  Referring Physician: ANA LILIA MEJÍA  Performed By:          Results for orders placed during the hospital encounter of 19   Adult Transthoracic Echo Complete W/ Cont if Necessary Per Protocol    Narrative                           Adult Echocardiogram Report          [facility]  Cardiology Department  [address] [place] [address]      Name: NIGEL QUINTANILLA MStudy Date: 2019 02:13 PM         BP: 135/75 mmHg  MRN: [id number]         Patient Location:   : 1935        Gender: Female                          Height: 60 in  Age: 83 yrs            Account#: [id number]                   Weight: 183 lb  Reason For Study: SOA PUL HTN                                  BSA: 1.8 m2  Ordering Physician:  ELAINE STUBBS  Referring Physician:  ANA LILIA MEJÍA  Performed By: SYLVESTER      M-Mode/2-D Measurements:  LVIDd: 4.6 cm       (3.7-5.7) LVPWd: 1.1 cm        (0.8-1.2)  LVIDs: 3.4 cm       (2.3-3.9)  ACS: 1.6 cm         (1.6-3.7) IVSd: 1.1 cm         (0.7-1.2)  LA dimension: 4.9 cm(1.9-4.0) RVDd: 2.6 cm         (0.7-2.4)  FS: 25.8 %          (21-40%)  Ao root diam: 2.3 cm  (2.0-3.7)    Comments  Technically adequate study.  Mitral valve is thickened with mild mitral regurgitation.  Tricuspid valve is normal with mild tricuspid regurgitation.  Calculated right ventricle systolic pressure is about 25 mm of mercury  consistent with mild pulmonary hypertension.  Aortic valve is sclerosed with adequate opening motion.  Left atrium is moderately dilated. Aortic root is normal in size. Right  ventricle is mildly dilated.  LV size and contractility appears normal. EF is about 55%.  No pericardial effusion noted.      Interpretation    MMode/2D Measurements & Calculations  ESV(Teich): 49.1 ml                     % IVS thick: 42.5 %  EF(Teich): 50.8 %                       % LVPW thick: 43.3 %                                          LVOT diam: 1.8 cm  Ao root area: 4.1 cm2  EDV(MOD-sp4): 85.4 ml  ESV(MOD-sp4): 38.5 ml  EF(MOD-sp4): 54.9 %    Doppler Measurements & Calculations  MV E max aba: 112.5 cm/sec                MV max P.9 mmHg  MV A max aba: 95.5 cm/sec                 MV mean P.6 mmHg  MV E/A: 1.2                                            Ao V2 max: 139.6 cm/sec  MV dec slope: 618.6 cm/sec2               Ao max P.8 mmHg  MV dec time: 0.18 sec                     Ao V2 mean: 97.4 cm/sec                                            Ao mean P.2 mmHg                                            Ao V2 VTI: 28.6 cm                                              PURA(I,D): 2.4 cm2                                            PURA(V,D): 2.2 cm2  LV V1 max P.1 mmHg                    PA max PG: 3.6 mmHg  LV V1 mean PG: 3.4 mmHg  LV V1 max: 123.8 cm/sec  LV V1 mean: 89.1 cm/sec  LV V1 VTI: 27.1 cm  TR max aba: 264.9 cm/sec  TR max P.4 mmHg  RVSP(TR): 38.4 mmHg      _______________________________________________________________________________    Electronically signed by: Yuval Choudhary MD  on 2019 07:10  PM         Ct Chest Without Contrast    Result Date:  10/19/2019  1. Cardiomegaly with mild interlobular septal thickening and scattered groundglass opacities most consistent with pulmonary edema. 2. Small-to-moderate bilateral pleural effusions with lower lobe airspace disease likely compressive atelectasis, superimposed pneumonia difficult to exclude at the right lower lobe. 3. Mildly enlarged mediastinal lymph nodes several of which demonstrate normal fatty ratna may relate to reactive adenopathy.  Electronically Signed By-Vasile Ludwig On:10/19/2019 9:09 AM This report was finalized on [id number] by  Mary Lopez    Nm Lung Ventilation Perfusion Aerosol    Result Date: 10/19/2019  Low probability study for pulmonary embolus.  Electronically Signed By-Vasile Ludwig On:10/19/2019 5:40 PM This report was finalized on [id number] by  Mary Lopez    Xr Chest 1 View    Result Date: 10/17/2019  Limited study demonstrating stable cardiomegaly, suspected moderate pulmonary edema, and small to moderate bilateral pleural effusions with underlying bibasilar atelectasis and/or pneumonia.  Electronically Signed By-Natalio Chanel On:10/17/2019 7:17 AM This report was finalized on [id number] by  Mary Aponte    Us Renal Bilateral    Result Date: 10/17/2019  Bilateral renal parenchymal atrophy and diffusely increased bilateral renal echogenicity, likely reflecting medical renal disease.  Electronically Signed By-Natalio Chanel On:10/17/2019 9:46 AM This report was finalized on [id number] by  Mary Aponte    Xr Chest Pa & Lateral    Result Date: 10/19/2019  1. Cardiomegaly and pulmonary edema, unchanged. 2. No change in small to moderate bilateral pleural effusions with bibasilar airspace disease.  Electronically Signed By-Vasile Ludwig On:10/19/2019 9:51 AM This report was finalized on [id number] by  Mary Lopez      Xrays, labs reviewed personally by physician.    Medication Review:   I have reviewed the patient's current medication list      Scheduled  Meds    ammonium lactate 1 application Topical Daily   aspirin 81 mg Oral Daily   atorvastatin 10 mg Oral Daily   bisacodyl 5 mg Oral Every Other Day   ceftriaxone 1 g Intravenous Q24H   cholecalciferol 1,000 Units Oral Daily   docusate sodium 100 mg Oral BID   febuxostat 40 mg Oral Daily   ferrous sulfate 324 mg Oral Daily With Breakfast   furosemide 40 mg Intravenous Q8H   gabapentin 300 mg Oral Nightly   hydrALAZINE 25 mg Oral TID   insulin glargine 30 Units Subcutaneous Nightly   insulin lispro 0-9 Units Subcutaneous 4x Daily With Meals & Nightly   isosorbide mononitrate 120 mg Oral Daily   ketotifen 1 drop Both Eyes BID   levothyroxine 112 mcg Oral Daily   linagliptin 5 mg Oral Daily   methadone 10 mg Oral TID   metoprolol tartrate 25 mg Oral Q12H   miconazole  Topical Q12H   oxybutynin XL 5 mg Oral Daily   pantoprazole 40 mg Oral Daily   polyethylene glycol 17 g Oral Daily   sertraline 25 mg Oral Daily   sodium chloride 10 mL Intravenous Q12H   vitamin C 1,000 mg Oral Daily   zinc oxide 1 application Topical BID       Meds Infusions       Meds PRN  •  acetaminophen **OR** acetaminophen **OR** acetaminophen  •  Calcium Gluconate-NaCl **AND** calcium gluconate **AND** Calcium  •  dextrose  •  dextrose  •  docusate sodium  •  glucagon (human recombinant)  •  ketamine (KETALAR) infusion **AND** Ketamine Vital Signs & Assessment  •  magnesium hydroxide  •  magnesium sulfate **OR** magnesium sulfate **OR** magnesium sulfate  •  melatonin  •  nitroglycerin  •  ondansetron  •  ondansetron  •  potassium chloride  •  potassium chloride  •  sodium chloride        Assessment / Plan    Active Hospital Problems    Diagnosis  POA   • Pressure injury of sacral region, stage 2 (CMS/HCC) [L89.152]  Yes      Resolved Hospital Problems    Diagnosis Date Resolved POA   • Acute congestive heart failure (CMS/[facility]) [I50.9] 10/18/2019 Yes     #1 cough congestion shortness of breath with abnormal chest x-ray possible pneumonia and we  will continue Rocephin and doxycycline as those are started at nursing home.  - CT chest without contrast showed Cardiomegaly with mild interlobular septal thickening and scattered groundglass opacities most consistent with pulmonary edema.Small-to-moderate bilateral pleural effusions with lower lobe airspace disease likely compressive atelectasis, superimposed pneumonia  difficult to exclude at the right lower lobe.Mildly enlarged mediastinal lymph nodes several of which demonstrate  normal fatty ratna may relate to reactive adenopathy.  -Patient is receiving IV Lasix-continue   - Continue Rocephin and doxycycline as those are started at nursing home.  -Patient has d-dimer positive, VQ scan negative  -Nephrology want to keep in the 24 hours for IV diuresis        #2 Chronic diastolic CHF with EF 55% about few months ago on echocardiogram as well as patient has high proBNP and we will continue current home medications and at this time patient does not seem to be in acute CHF exacerbation.    -CT chest did show pulmonary edema and small to moderate bilateral pleural effusions.   -Continue IV Lasix and beta-blocker     #3 chronic kidney disease stage IV and patient seen by nephrology for further management  -Creatinine 2.6, BUN 66 -monitor  -Nephrology is managing patient clinical problem and IV diuresis to be continued     #4 anemia of chronic kidney disease versus possible GI bleed and patient has recently been transfused  -GI followed patient and has signed off and does not plan to proceed with endoscopy at this time due to family request  -Hemoglobin 8.4, monitor     #5 bladder incontinence patient be continued on home medications     #6 diabetes mellitus type 2 patient is on linagliptin/Lantus insulin and will check Accu-Cheks and use sliding scale insulin     #7 hypertension patient is on Lopressor as well as hydralazine and will continue it     #8 diabetic neuropathy and will continue gabapentin     #9 chronic  gout patient is on Uloric continue     #10 GERD patient is on PPI and will continue it     #11 chronic methadone therapy, not sure why she is on it but will continue at this time since it was being given at the nursing home.     #12 hyperlipidemia and patient is on statin therapy and will continue it     #13 DVT prophylaxis and I will hold off on using Lovenox since patient has low hemoglobin and will be she is having GI bleed and has required blood transfusion recently.     #14 hypothyroidism continue levothyroxine     #15 depression patient is on Zoloft will continue it       PT Recommendation and Plan  Skilled Nursing Facility (DC - External)            Discharge Planning    Destination - Selection Complete      Service Provider Request Status Selected Services Address Phone Number Fax Number    [facility] [address] [place] [address] [phone]       Jody Vazquez 10/17/2019 0931    Ok to return to facility LTC per Rosetta,no PASRR or precert needed                 Durable Medical Equipment      No service coordination in this encounter.      Dialysis/Infusion      No service coordination in this encounter.      Home Medical Care      No service coordination in this encounter.      Therapy      No service coordination in this encounter.      Community Resources      No service coordination in this encounter.          Ben Carlisle MD  10/20/19  12:38 PM

## 2019-10-20 NOTE — PLAN OF CARE
Problem: Patient Care Overview  Goal: Plan of Care Review  Outcome: Ongoing (interventions implemented as appropriate)   10/20/19 9834   Coping/Psychosocial   Plan of Care Reviewed With patient   Plan of Care Review   Progress improving   OTHER   Outcome Summary Patient says she is feeling much better than last night. No respiratory distress. Vitals have been stable. No complaints from patient

## 2019-10-20 NOTE — PROGRESS NOTES
"NEPHROLOGY PROGRESS NOTE------[facility]    [facility]  [phone]  Pio Avila MD      Patient Care Team:  Chris Remy MD as PCP - General (Geriatric Medicine)  Diana Sesay MD as PCP - Claims Attributed  Adry Bear MD as PCP - Family Medicine      Provider:  Pio Avila MD  Patient Name: Amanda Combs  :  1935    SUBJECTIVE:  F/u PAUL/CKD3  Breathing better  Diursed well with metolazone yesterday    Medication:    ammonium lactate 1 application Topical Daily   aspirin 81 mg Oral Daily   atorvastatin 10 mg Oral Daily   bisacodyl 5 mg Oral Every Other Day   ceftriaxone 1 g Intravenous Q24H   cholecalciferol 1,000 Units Oral Daily   docusate sodium 100 mg Oral BID   doxycycline 100 mg Oral Q12H   febuxostat 40 mg Oral Daily   ferrous sulfate 324 mg Oral Daily With Breakfast   furosemide 40 mg Intravenous Q8H   gabapentin 300 mg Oral Nightly   hydrALAZINE 25 mg Oral TID   insulin glargine 30 Units Subcutaneous Nightly   insulin lispro 0-9 Units Subcutaneous 4x Daily With Meals & Nightly   isosorbide mononitrate 120 mg Oral Daily   ketotifen 1 drop Both Eyes BID   levothyroxine 112 mcg Oral Daily   linagliptin 5 mg Oral Daily   methadone 10 mg Oral TID   metoprolol tartrate 25 mg Oral Q12H   miconazole  Topical Q12H   oxybutynin XL 5 mg Oral Daily   pantoprazole 40 mg Oral Daily   polyethylene glycol 17 g Oral Daily   sertraline 25 mg Oral Daily   sodium chloride 10 mL Intravenous Q12H   vitamin C 1,000 mg Oral Daily   zinc oxide 1 application Topical BID          OBJECTIVE    Vital Sign Min/Max for last 24 hours  Temp  Min: 97.7 °F (36.5 °C)  Max: 97.9 °F (36.6 °C)   BP  Min: 148/70  Max: 179/69   Pulse  Min: 56  Max: 72   Resp  Min: 17  Max: 18   SpO2  Min: 92 %  Max: 97 %   No Data Recorded   No Data Recorded     Flowsheet Rows      First Filed Value   Admission Height  152.4 cm (60\") Documented at 10/16/2019 2312 "   Admission Weight  101 kg (222 lb) Documented at 10/16/2019 2312          No intake/output data recorded.  I/O last 3 completed shifts:  In: 520 [P.O.:520]  Out: 202 [Urine:202]    Physical Exam:  General Appearance: alert, appears stated age and cooperative  Head: normocephalic, without obvious abnormality and atraumatic  Eyes: conjunctivae and sclerae normal and no icterus  Neck: supple and no JVD  Lungs: clear to auscultation and respirations regular  Heart: regular rhythm & normal rate and normal S1, S2  Chest: Wall no abnormalities observed  Abdomen: normal bowel sounds and soft non-tender  Extremities: moves extremities well, 1+ edema, no cyanosis and no redness  Skin: no bleeding, bruising or rash, turgor normal, color normal and no leasions noted  Neurologic: Alert, and oriented. No focal deficits    Labs:    WBC WBC   Date Value Ref Range Status   10/20/2019 8.10 3.40 - 10.80 10*3/mm3 Final   10/19/2019 9.00 3.40 - 10.80 10*3/mm3 Final     Comment:     Result checked    10/18/2019 9.80 3.40 - 10.80 10*3/mm3 Final   10/17/2019 7.00 3.40 - 10.80 10*3/mm3 Final      HGB Hemoglobin   Date Value Ref Range Status   10/20/2019 9.0 (L) 12.0 - 15.9 g/dL Final   10/19/2019 8.4 (L) 12.0 - 15.9 g/dL Final   10/18/2019 8.8 (L) 12.0 - 15.9 g/dL Final   10/17/2019 8.6 (L) 12.0 - 15.9 g/dL Final      HCT Hematocrit   Date Value Ref Range Status   10/20/2019 27.8 (L) 34.0 - 46.6 % Final   10/19/2019 26.7 (L) 34.0 - 46.6 % Final   10/18/2019 28.2 (L) 34.0 - 46.6 % Final   10/17/2019 28.6 (L) 34.0 - 46.6 % Final      Platlets No results found for: LABPLAT   MCV MCV   Date Value Ref Range Status   10/20/2019 87.6 79.0 - 97.0 fL Final   10/19/2019 87.7 79.0 - 97.0 fL Final   10/18/2019 89.3 79.0 - 97.0 fL Final   10/17/2019 90.4 79.0 - 97.0 fL Final          Sodium Sodium   Date Value Ref Range Status   10/20/2019 143 136 - 145 mmol/L Final   10/19/2019 142 136 - 145 mmol/L Final   10/19/2019 142 136 - 145 mmol/L Final    10/18/2019 142 136 - 145 mmol/L Final   10/17/2019 141 136 - 145 mmol/L Final      Potassium Potassium   Date Value Ref Range Status   10/20/2019 4.2 3.5 - 5.2 mmol/L Final   10/19/2019 4.3 3.5 - 5.2 mmol/L Final   10/19/2019 4.5 3.5 - 5.2 mmol/L Final   10/18/2019 5.1 3.5 - 5.2 mmol/L Final   10/17/2019 4.9 3.5 - 5.2 mmol/L Final      Chloride Chloride   Date Value Ref Range Status   10/20/2019 101 98 - 107 mmol/L Final   10/19/2019 101 98 - 107 mmol/L Final   10/19/2019 100 98 - 107 mmol/L Final   10/18/2019 103 98 - 107 mmol/L Final   10/17/2019 104 98 - 107 mmol/L Final      CO2 CO2   Date Value Ref Range Status   10/20/2019 33.0 (H) 22.0 - 29.0 mmol/L Final   10/19/2019 29.0 22.0 - 29.0 mmol/L Final   10/19/2019 30.0 (H) 22.0 - 29.0 mmol/L Final   10/18/2019 29.0 22.0 - 29.0 mmol/L Final   10/17/2019 26.0 22.0 - 29.0 mmol/L Final      BUN BUN   Date Value Ref Range Status   10/20/2019 67 (H) 8 - 23 mg/dL Final   10/19/2019 66 (H) 8 - 23 mg/dL Final   10/19/2019 66 (H) 8 - 23 mg/dL Final   10/18/2019 62 (H) 8 - 23 mg/dL Final   10/17/2019 62 (H) 8 - 23 mg/dL Final      Creatinine Creatinine   Date Value Ref Range Status   10/20/2019 2.59 (H) 0.57 - 1.00 mg/dL Final   10/19/2019 2.61 (H) 0.57 - 1.00 mg/dL Final   10/19/2019 2.64 (H) 0.57 - 1.00 mg/dL Final   10/18/2019 2.76 (H) 0.57 - 1.00 mg/dL Final   10/17/2019 2.66 (H) 0.57 - 1.00 mg/dL Final      Calcium Calcium   Date Value Ref Range Status   10/20/2019 8.7 8.6 - 10.5 mg/dL Final   10/19/2019 8.8 8.6 - 10.5 mg/dL Final   10/19/2019 8.6 8.6 - 10.5 mg/dL Final   10/18/2019 8.5 (L) 8.6 - 10.5 mg/dL Final   10/17/2019 8.4 (L) 8.6 - 10.5 mg/dL Final      PO4 No components found for: PO4   Albumin Albumin   Date Value Ref Range Status   10/20/2019 3.30 (L) 3.50 - 5.20 g/dL Final   10/19/2019 3.50 3.50 - 5.20 g/dL Final   10/18/2019 3.40 (L) 3.50 - 5.20 g/dL Final   10/17/2019 3.30 (L) 3.50 - 5.20 g/dL Final      Magnesium Magnesium   Date Value Ref Range Status    10/19/2019 1.9 1.6 - 2.4 mg/dL Final   10/18/2019 2.1 1.6 - 2.4 mg/dL Final   10/17/2019 2.2 1.6 - 2.4 mg/dL Final      Uric Acid No components found for: URIC ACID     Imaging Results (last 72 hours)     Procedure Component Value Units Date/Time    US Renal Bilateral [[id number]] Collected:  10/17/19 0945     Updated:  10/17/19 0949    Narrative:       Examination: US RENAL BILATERAL-     Date of Exam: 10/17/2019 9:08 AM     Indication: ARF/CRF; I50.9-Heart failure, unspecified; R79.89-Other  specified abnormal findings of blood chemistry.     Comparison: 3/22/2019.     Technique: Grayscale and color Doppler ultrasound evaluation of the  kidneys and urinary bladder was performed     Findings:  Study is mildly limited by the patient's body habitus. Bilateral renal  parenchymal atrophy with diffusely increased bilateral renal  echogenicity, likely reflecting medical renal disease. No solid renal  mass, shadowing stone, or hydronephrosis. The right kidney measures 9.8  cm in length. The left kidney measures 8.1 cm in length. Limited imaging  of the urinary bladder is unremarkable.       Impression:       Bilateral renal parenchymal atrophy and diffusely increased bilateral  renal echogenicity, likely reflecting medical renal disease.     Electronically Signed By-Natalio Chanel On:10/17/2019 9:46 AM  This report was finalized on [id number] by  Natalio Chanel, .    XR Chest 1 View [[id number]] Collected:  10/17/19 0715     Updated:  10/17/19 0719    Narrative:       DATE OF EXAM:  10/16/2019 11:55 PM     PROCEDURE:  XR CHEST 1 VW-     INDICATIONS:  soa     COMPARISON:  Radiograph 4/11/2019, 3/22/2019, and 6/1/2017.     TECHNIQUE:   Single radiographic AP view of the chest was obtained.     FINDINGS:  The study is limited by lordotic patient positioning. Overlying  artifacts. Small to moderate bilateral pleural effusions with basilar  predominant opacification of both lungs. Indistinct pulmonary  vasculature. No  pneumothorax. Stable cardiomegaly, likely accentuated by  technique. Calcified atherosclerotic disease in the thoracic aorta.  Partially visualized thoracic spondylosis.        Impression:       Limited study demonstrating stable cardiomegaly, suspected moderate  pulmonary edema, and small to moderate bilateral pleural effusions with  underlying bibasilar atelectasis and/or pneumonia.     Electronically Signed By-Natalio Chanel On:10/17/2019 7:17 AM  This report was finalized on [id number] by  Natalio Chanel, .          Results for orders placed during the hospital encounter of 10/16/19   XR Chest PA & Lateral    Narrative    DATE OF EXAM:   10/19/2019 9:44 AM     PROCEDURE:   XR CHEST PA AND LATERAL-     INDICATIONS:   elevated D-Dimer 4.59. Not appropriate for CT PE d/t elevated  creatinine; I50.9-Heart failure, unspecified; R79.89-Other specified  abnormal findings of blood chemistry     COMPARISON:  10/16/2019     TECHNIQUE:   [PA and lateral views of the chest were obtained.]     FINDINGS:   There are small-to-moderate bilateral pleural effusions with bibasilar  airspace disease likely compressive atelectasis. Cardiomegaly and  findings of pulmonary edema again noted. No pneumothorax.        Impression 1. Cardiomegaly and pulmonary edema, unchanged.  2. No change in small to moderate bilateral pleural effusions with  bibasilar airspace disease.     Electronically Signed By-Vasile Ludwig On:10/19/2019 9:51 AM  This report was finalized on [id number] by  Vasile Ludwig .   XR Chest 1 View    Narrative DATE OF EXAM:  10/16/2019 11:55 PM     PROCEDURE:  XR CHEST 1 VW-     INDICATIONS:  soa     COMPARISON:  Radiograph 4/11/2019, 3/22/2019, and 6/1/2017.     TECHNIQUE:   Single radiographic AP view of the chest was obtained.     FINDINGS:  The study is limited by lordotic patient positioning. Overlying  artifacts. Small to moderate bilateral pleural effusions with basilar  predominant opacification of both lungs.  Indistinct pulmonary  vasculature. No pneumothorax. Stable cardiomegaly, likely accentuated by  technique. Calcified atherosclerotic disease in the thoracic aorta.  Partially visualized thoracic spondylosis.        Impression Limited study demonstrating stable cardiomegaly, suspected moderate  pulmonary edema, and small to moderate bilateral pleural effusions with  underlying bibasilar atelectasis and/or pneumonia.     Electronically Signed By-Natalio Chanel On:10/17/2019 7:17 AM  This report was finalized on [id number] by  Natalio Chanel, .       Results for orders placed during the hospital encounter of 19   Duplex Venous Lower Extremity - Bilateral CAR    Narrative                   Lower Extremity Venous Report                          [facility]                         Vascular Laboratory                            [address] [place] [address]    Name: NIGEL QUINTANILLA              Study Date: 2019 01:46 PM  MRN: [id number]                       Patient Location: OU  : 1935 (M/d/yyyy)           Gender: Female  Age: 83 yrs                          Account#: [id number]  Reason For Study: edema      Procedure  Venous study was carried out in bilateral lower extremities. Gray scale, color  flow, and spectral doppler images were obtained. Images were obtained in  transverse and longitudinal views.    Right Lower Extremity Venous  On the right side the patient has patent common femoral, femoral, and  popliteal veins. The main veins are easily compressible. Femoral vein was  mapped in its entirety through the course of the thigh. It is patent and  compresses well. The popliteal vein is patent along with its tributaries and  compresses well with no evidence of any filling defect. Augmentation test is  positive. Respiratory waves are biphasic. Distal veins are patent. The right  greater and small saphenous veins are patent with good compressibility. There  is  fluid retention noted, suggestive of edema. There is a cystic fluid  retention behind the right knee which could be a Baker's cyst.    Left Lower Extremity Venous  On the left side the patient has patent common femoral, femoral, and popliteal  veins. The main veins compress well. Femoral vein was mapped in its entirety  through the course of the thigh. It is patent and compresses well. The  popliteal vein is patent along with its tributaries and compresses well with  no evidence of any filling defect. Augmentation test is positive. Respiratory  waves are biphasic. Distal veins are patent. The left greater and small  saphenous veins are patent with good compressibility. There is fluid retention  noted, suggestive of edema. There is a lymph node noted in the left groin.      Interpretation Summary  No evidence of any deep vein thrombosis or superficial vein thrombosis.  _______________________________________________________________________________    Electronically signed by: Alejandro Escobar MD  on 04/12/2019 04:36 PM    Ordering Physician: ELAINE STUBBS  Referring Physician: ANA LILIA MEJÍA  Performed By: RH           ASSESSMENT / PLAN      Pressure injury of sacral region, stage 2 (CMS/HCC)    1. PAUL/CKD3------Nonoliguric. +ARF/PAUL on top of known CRF/CKD STG 3, with a baseline serum creatinine of 2.1. 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 +ARF/PAUL secondary to decompensated CHF/CP state. Diurese. Avoid hypotension. Check urine and serum studies and renal US. No NSAIDs or IV dye. Follow for dialysis need     2. 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 Check TSH. Follow     3. HTN WITH CKD STG 3------BP okay. Avoid hypotension. No ACE/ARB/DRI     4. ANEMIA OF CKD------Check Fe and hemoccult. Follow for PRBC and/or EPO/iron need     5. DMII WITH RENAL MANIFESTATIONS-------Glucometers, SSI. BS okay     6. OA/DJD------No NSAIDs. Check uric acid  level     7. GERD------ON PPI. Aware or risks with regards to CKD     8. HYPERLIPIDEMIA------On Statin. Check CK, TSH     9. VITAMIN D DEFICIENCY------On supplementation.    CR stable, but still with excess volume  Continue iv lasix, add metolazone again today  Would keep here another day or two.  CT with pulm edema, pleural effusion    Pio Avila MD  [facility]  [phone]  10/20/19  6:41 AM

## 2019-10-21 NOTE — PROGRESS NOTES
Discharge Planning Assessment   [place]     Patient Name: Amanda Combs  MRN: [id number]  Today's Date: 10/21/2019    Admit Date: 10/16/2019          Plan    Patient/Family in Agreement with Plan  -- talked to daughter imelda and she is in agreement with discharge to [facility] re[facility] when md is ready          Patient Forms    Important Message from Medicare (IMM)  Delivered    Delivered to  Support person rohan segura    Method of delivery  In person              Carol naegele rn  Case management  Office number [phone]  Cell phone [phone]

## 2019-10-21 NOTE — DISCHARGE SUMMARY
Date of Admission: 10/16/2019    Date of Discharge:  10/21/2019    Length of stay:  LOS: 4 days     Admission Diagnosis:cough congestion shortness of breath with abnormal chest x-ray possible pneumonia     Principal and Active Diagnosis During Hospital Stay:   #1 cough congestion shortness of breath with abnormal chest x-ray possible pneumonia and we will continue Rocephin and doxycycline as those are started at nursing home.  - CT chest without contrast showed Cardiomegaly with mild interlobular septal thickening and scattered groundglass opacities most consistent with pulmonary edema.Small-to-moderate bilateral pleural effusions with lower lobe airspace disease likely compressive atelectasis, superimposed pneumonia  difficult to exclude at the right lower lobe.Mildly enlarged mediastinal lymph nodes several of which demonstrate  normal fatty ratna may relate to reactive adenopathy.  -Patient is receiving IV Lasix-continue   - DC on course of omnicef   -Patient has d-dimer positive, VQ scan negative        #2 Acute on Chronic diastolic CHF with EF 55% about few months ago on echocardiogram as well as patient has high proBNP and we will continue current home medications and at this time patient does not seem to be in acute CHF exacerbation.    -CT chest did show pulmonary edema and small to moderate bilateral pleural effusions.   -DC on TID bumex per nephrology      #3 chronic kidney disease stage IV and patient seen by nephrology for further management  -follow up nephrology 2 weeks     #4 anemia of chronic kidney disease versus possible GI bleed and patient has recently been transfused  -GI followed patient and has signed off and does not plan to proceed with endoscopy at this time due to family request  -Hemoglobin stable      #5 bladder incontinence patient be continued on home medications     #6 diabetes mellitus type 2 patient is on linagliptin/Lantus insulin and will check Accu-Cheks and use sliding scale  insulin     #7 hypertension patient is on Lopressor as well as hydralazine and will continue it     #8 diabetic neuropathy and will continue gabapentin     #9 chronic gout patient is on Uloric continue     #10 GERD patient is on PPI and will continue it     #11 chronic methadone therapy, not sure why she is on it but will continue at this time since it was being given at the nursing home.     #12 hyperlipidemia and patient is on statin therapy and will continue it     #13 Asymptomatic Bacteruria   -UA was clear but urine cultured for unclear reason  - at any rate, no ABX as asymptomatic and likely colonized with bacteria     #14 hypothyroidism continue levothyroxine     #15 depression patient is on Zoloft will continue it         Hospital Course  Patient is a 84 y.o. female presented with shortness of breath.  She  Had above treatment and evaluation. She improved and was adjusted on her diuretics, and will be sent back to [facility] for continued care.       Procedures Performed:none         Consults:   Consults     Date and Time Order Name Status Description    10/17/2019 0414 Inpatient Nephrology Consult Completed     10/17/2019 0414 Inpatient Gastroenterology Consult Completed     10/17/2019 0316 Inpatient Hospitalist Consult Completed           Pertinent Test Results:     Lab Results (last 72 hours)     Procedure Component Value Units Date/Time    POC Glucose Once [[id number]]  (Normal) Collected:  10/21/19 0732    Specimen:  Blood Updated:  10/21/19 0736     Glucose 83 mg/dL      Comment: Serial Number: [id number]:  [id number]       Renal Function Panel [[id number]]  (Abnormal) Collected:  10/21/19 0332    Specimen:  Blood Updated:  10/21/19 0450     Glucose 114 mg/dL      BUN 76 mg/dL      Creatinine 2.75 mg/dL      Sodium 143 mmol/L      Potassium 4.3 mmol/L      Chloride 98 mmol/L      CO2 33.0 mmol/L      Calcium 8.7 mg/dL      Albumin 3.10 g/dL      Phosphorus 4.7 mg/dL      Anion Gap 12.0 mmol/L       BUN/Creatinine Ratio 27.6     eGFR Non African Amer 16 mL/min/1.73     Narrative:       GFR Normal >60  Chronic Kidney Disease <60  Kidney Failure <15    CBC & Differential [[id number]] Collected:  10/21/19 0332    Specimen:  Blood Updated:  10/21/19 0423    Narrative:       The following orders were created for panel order CBC & Differential.  Procedure                               Abnormality         Status                     ---------                               -----------         ------                     CBC Auto Differential[[id number]]        Abnormal            Final result                 Please view results for these tests on the individual orders.    CBC Auto Differential [[id number]]  (Abnormal) Collected:  10/21/19 0332    Specimen:  Blood Updated:  10/21/19 0423     WBC 9.10 10*3/mm3      RBC 3.05 10*6/mm3      Hemoglobin 8.6 g/dL      Hematocrit 27.3 %      MCV 89.4 fL      MCH 28.2 pg      MCHC 31.5 g/dL      RDW 21.5 %      RDW-SD 68.7 fl      MPV 8.7 fL      Platelets 120 10*3/mm3      Neutrophil % 68.9 %      Lymphocyte % 16.1 %      Monocyte % 8.8 %      Eosinophil % 5.6 %      Basophil % 0.6 %      Neutrophils, Absolute 6.30 10*3/mm3      Lymphocytes, Absolute 1.50 10*3/mm3      Monocytes, Absolute 0.80 10*3/mm3      Eosinophils, Absolute 0.50 10*3/mm3      Basophils, Absolute 0.10 10*3/mm3      nRBC 0.1 /100 WBC     Blood Culture - Blood, Hand, Right [[id number]] Collected:  10/17/19 0023    Specimen:  Blood from Hand, Right Updated:  10/21/19 0033     Blood Culture No growth at 4 days    Blood Culture - Blood, Arm, Left [[id number]] Collected:  10/17/19 0019    Specimen:  Blood from Arm, Left Updated:  10/21/19 0033     Blood Culture No growth at 4 days    POC Glucose Once [[id number]]  (Abnormal) Collected:  10/20/19 2130    Specimen:  Blood Updated:  10/20/19 2131     Glucose 151 mg/dL      Comment: Serial Number: [id number]:  [id number]       POC Glucose Once [[id number]]  (Abnormal)  Collected:  10/20/19 1631    Specimen:  Blood Updated:  10/20/19 1634     Glucose 145 mg/dL      Comment: Serial Number: [id number]:  [id number]       POC Glucose Once [[id number]]  (Abnormal) Collected:  10/20/19 1141    Specimen:  Blood Updated:  10/20/19 1144     Glucose 169 mg/dL      Comment: Serial Number: [id number]:  [id number]       Urine Culture - Urine, Urine, Clean Catch [[id number]]  (Abnormal)  (Susceptibility) Collected:  10/18/19 1830    Specimen:  Urine, Clean Catch Updated:  10/20/19 0952     Urine Culture >100,000 CFU/mL Enterococcus faecium, VRE     Comment:   Vancomycin Resistant Enterococcus species. Patient may be an isolation risk.       Susceptibility      Enterococcus faecium, VRE     DEB     Ampicillin Resistant     Levofloxacin Resistant     Nitrofurantoin Resistant     Tetracycline Resistant     Vancomycin Resistant                    POC Glucose Once [[id number]]  (Abnormal) Collected:  10/20/19 0743    Specimen:  Blood Updated:  10/20/19 0746     Glucose 118 mg/dL      Comment: Serial Number: [id number]:  [id number]       Renal Function Panel [[id number]]  (Abnormal) Collected:  10/20/19 0404    Specimen:  Blood Updated:  10/20/19 0555     Glucose 105 mg/dL      BUN 67 mg/dL      Creatinine 2.59 mg/dL      Sodium 143 mmol/L      Potassium 4.2 mmol/L      Chloride 101 mmol/L      CO2 33.0 mmol/L      Calcium 8.7 mg/dL      Albumin 3.30 g/dL      Phosphorus 4.4 mg/dL      Anion Gap 9.0 mmol/L      BUN/Creatinine Ratio 25.9     eGFR Non African Amer 18 mL/min/1.73     Narrative:       GFR Normal >60  Chronic Kidney Disease <60  Kidney Failure <15    CBC & Differential [[id number]] Collected:  10/20/19 0404    Specimen:  Blood Updated:  10/20/19 0532    Narrative:       The following orders were created for panel order CBC & Differential.  Procedure                               Abnormality         Status                     ---------                               -----------          ------                     CBC Auto Differential[[id number]]        Abnormal            Final result                 Please view results for these tests on the individual orders.    CBC Auto Differential [[id number]]  (Abnormal) Collected:  10/20/19 0404    Specimen:  Blood Updated:  10/20/19 0532     WBC 8.10 10*3/mm3      RBC 3.18 10*6/mm3      Hemoglobin 9.0 g/dL      Hematocrit 27.8 %      MCV 87.6 fL      MCH 28.3 pg      MCHC 32.3 g/dL      RDW 20.8 %      RDW-SD 65.2 fl      MPV 9.3 fL      Platelets 126 10*3/mm3      Neutrophil % 73.4 %      Lymphocyte % 13.8 %      Monocyte % 8.4 %      Eosinophil % 3.7 %      Basophil % 0.7 %      Neutrophils, Absolute 5.90 10*3/mm3      Lymphocytes, Absolute 1.10 10*3/mm3      Monocytes, Absolute 0.70 10*3/mm3      Eosinophils, Absolute 0.30 10*3/mm3      Basophils, Absolute 0.10 10*3/mm3      nRBC 0.4 /100 WBC     POC Glucose Once [[id number]]  (Abnormal) Collected:  10/19/19 1959    Specimen:  Blood Updated:  10/19/19 2000     Glucose 143 mg/dL      Comment: Serial Number: [id number]:  [id number]       POC Glucose Once [[id number]]  (Abnormal) Collected:  10/19/19 1638    Specimen:  Blood Updated:  10/19/19 1639     Glucose 172 mg/dL      Comment: Serial Number: [id number]:  [id number]       POC Glucose Once [[id number]]  (Abnormal) Collected:  10/19/19 1227    Specimen:  Blood Updated:  10/19/19 1228     Glucose 145 mg/dL      Comment: Serial Number: [id number]:  [id number]       Basic Metabolic Panel [[id number]]  (Abnormal) Collected:  10/19/19 0926    Specimen:  Blood Updated:  10/19/19 1040     Glucose 142 mg/dL      BUN 66 mg/dL      Creatinine 2.61 mg/dL      Sodium 142 mmol/L      Potassium 4.3 mmol/L      Chloride 101 mmol/L      CO2 29.0 mmol/L      Calcium 8.8 mg/dL      eGFR Non African Amer 17 mL/min/1.73      BUN/Creatinine Ratio 25.3     Anion Gap 12.0 mmol/L     Narrative:       GFR Normal >60  Chronic Kidney Disease <60  Kidney Failure <15     POC Glucose Once [[id number]]  (Abnormal) Collected:  10/19/19 0959    Specimen:  Blood Updated:  10/19/19 1001     Glucose 136 mg/dL      Comment: Serial Number: [id number]:  [id number]       CK [[id number]]  (Normal) Collected:  10/19/19 0642    Specimen:  Blood Updated:  10/19/19 0932     Creatine Kinase 81 U/L     Troponin [[id number]]  (Abnormal) Collected:  10/19/19 0642    Specimen:  Blood Updated:  10/19/19 0932     Troponin T 0.052 ng/mL     Narrative:       Troponin T Reference Range:  <= 0.03 ng/mL-   Negative for AMI  >0.03 ng/mL-     Abnormal for myocardial necrosis.  Clinicians would have to utilize clinical acumen, EKG, Troponin and serial changes to determine if it is an Acute Myocardial Infarction or myocardial injury due to an underlying chronic condition.     Comprehensive Metabolic Panel [[id number]]  (Abnormal) Collected:  10/19/19 0642    Specimen:  Blood Updated:  10/19/19 0855     Glucose 122 mg/dL      BUN 66 mg/dL      Creatinine 2.64 mg/dL      Sodium 142 mmol/L      Potassium 4.5 mmol/L      Chloride 100 mmol/L      CO2 30.0 mmol/L      Calcium 8.6 mg/dL      Total Protein 6.8 g/dL      Albumin 3.50 g/dL      ALT (SGPT) 8 U/L      AST (SGOT) 17 U/L      Alkaline Phosphatase 79 U/L      Total Bilirubin 0.5 mg/dL      eGFR Non African Amer 17 mL/min/1.73      Globulin 3.3 gm/dL      A/G Ratio 1.1 g/dL      BUN/Creatinine Ratio 25.0     Anion Gap 12.0 mmol/L     Narrative:       GFR Normal >60  Chronic Kidney Disease <60  Kidney Failure <15    Magnesium [[id number]]  (Normal) Collected:  10/19/19 0642    Specimen:  Blood Updated:  10/19/19 0855     Magnesium 1.9 mg/dL     Phosphorus [[id number]]  (Normal) Collected:  10/19/19 0642    Specimen:  Blood Updated:  10/19/19 0855     Phosphorus 4.2 mg/dL     BNP [[id number]]  (Abnormal) Collected:  10/19/19 0642    Specimen:  Blood Updated:  10/19/19 0850     proBNP 12,632.0 pg/mL     Narrative:       Among patients with dyspnea, NT-proBNP is  highly sensitive for the detection of acute congestive heart failure. In addition NT-proBNP of <300 pg/ml effectively rules out acute congestive heart failure with 99% negative predictive value.    CBC & Differential [[id number]] Collected:  10/19/19 0642    Specimen:  Blood Updated:  10/19/19 0812    Narrative:       The following orders were created for panel order CBC & Differential.  Procedure                               Abnormality         Status                     ---------                               -----------         ------                     CBC Auto Differential[[id number]]        Abnormal            Final result                 Please view results for these tests on the individual orders.    CBC Auto Differential [[id number]]  (Abnormal) Collected:  10/19/19 0642    Specimen:  Blood Updated:  10/19/19 0812     WBC 9.00 10*3/mm3      Comment: Result checked         RBC 3.05 10*6/mm3      Hemoglobin 8.4 g/dL      Hematocrit 26.7 %      MCV 87.7 fL      MCH 27.6 pg      MCHC 31.4 g/dL      RDW 20.8 %      RDW-SD 64.8 fl      MPV 9.2 fL      Platelets 127 10*3/mm3      Neutrophil % 81.7 %      Lymphocyte % 9.3 %      Monocyte % 7.1 %      Eosinophil % 1.4 %      Basophil % 0.5 %      Neutrophils, Absolute 7.30 10*3/mm3      Lymphocytes, Absolute 0.80 10*3/mm3      Monocytes, Absolute 0.60 10*3/mm3      Eosinophils, Absolute 0.10 10*3/mm3      Basophils, Absolute 0.00 10*3/mm3      nRBC 0.3 /100 WBC     POC Glucose Once [[id number]]  (Abnormal) Collected:  10/18/19 2118    Specimen:  Blood Updated:  10/18/19 2120     Glucose 149 mg/dL      Comment: Serial Number: [id number]:  [id number]       D-dimer, Quantitative [[id number]]  (Abnormal) Collected:  10/18/19 1946    Specimen:  Blood Updated:  10/18/19 2048     D-Dimer, Quantitative 4.59 MCGFEU/mL     Narrative:       Reference Range  --------------------------------------------------------------------     < 0.50   Negative Predictive Value  0.50-0.59    Indeterminate    >= 0.60   Probable VTE             A very low percentage of patients with DVT may yield D-Dimer results   below the cut-off of 0.50 MCGFEU/mL.  This is known to be more   prevalent in patients with distal DVT.             Results of this test should always be interpreted in conjunction with   the patient's medical history, clinical presentation and other   findings.  Clinical diagnosis should not be based on the result of   INNOVANCE D-Dimer alone.    Urinalysis With Culture If Indicated - Urine, Clean Catch [[id number]]  (Abnormal) Collected:  10/18/19 1830    Specimen:  Urine, Clean Catch Updated:  10/18/19 1857     Color, UA Yellow     Appearance, UA Clear     pH, UA 6.5     Specific Gravity, UA 1.010     Glucose, UA Negative     Ketones, UA Negative     Bilirubin, UA Negative     Blood, UA Negative     Protein,  mg/dL (2+)     Leuk Esterase, UA Negative     Nitrite, UA Negative     Urobilinogen, UA 0.2 E.U./dL    Urinalysis, Microscopic Only - Urine, Clean Catch [[id number]]  (Abnormal) Collected:  10/18/19 1830    Specimen:  Urine, Clean Catch Updated:  10/18/19 1857     RBC, UA 0-2 /HPF      WBC, UA 13-20 /HPF      Bacteria, UA None Seen /HPF      Squamous Epithelial Cells, UA 3-6 /HPF      Hyaline Casts, UA 3-6 /LPF      Methodology Automated Microscopy    POC Glucose Once [[id number]]  (Normal) Collected:  10/18/19 1646    Specimen:  Blood Updated:  10/18/19 1649     Glucose 102 mg/dL      Comment: Serial Number: [id number]:  [id number]       POC Glucose Once [[id number]]  (Abnormal) Collected:  10/18/19 1147    Specimen:  Blood Updated:  10/18/19 1156     Glucose 152 mg/dL      Comment: Serial Number: [id number]:  [id number]                No results found for: BLOODCX     Urine Culture   Date Value Ref Range Status   10/18/2019 >100,000 CFU/mL Enterococcus faecium, VRE (C)  Final     Comment:       Vancomycin Resistant Enterococcus species. Patient may be an isolation risk.       No results found for: WOUNDCX   No results found for: RESPCX   No results found for: STOOLCX   No results found for: STOOLCXY   No results found for: MRSACX   No results found for: VRECX   No results found for: CRECX   No components found for: AFBSTAINCX   No results found for: AFBCX   No results found for: AFBCXBLD   No results found for: FUNGUSCX   No components found for: GMSSTAIN   No results found for: KOHPREP   No results found for: ANACX   No results found for: BODYFLDCX   No results found for: CSFCX   No results found for: CULTURE   No results found for: THROATCX   No results found for: THROATCXBS   No results found for: ICECX   No results found for: DICECX   No results found for: GCCX      Results for orders placed during the hospital encounter of 19   Adult Transthoracic Echo Complete W/ Cont if Necessary Per Protocol    Narrative                           Adult Echocardiogram Report          [facility]  Cardiology Department  [address] [place] [address]      Name: NIGEL QUINTANILLA MStudy Date: 2019 02:13 PM         BP: 135/75 mmHg  MRN: [id number]         Patient Location: OU  : 1935        Gender: Female                          Height: 60 in  Age: 83 yrs            Account#: [id number]                   Weight: 183 lb  Reason For Study: SOA PUL HTN                                  BSA: 1.8 m2  Ordering Physician:  ELAINE STUBBS  Referring Physician:  ANA LILIA MEJÍA  Performed By: SYLVESTER      M-Mode/2-D Measurements:  LVIDd: 4.6 cm       (3.7-5.7) LVPWd: 1.1 cm        (0.8-1.2)  LVIDs: 3.4 cm       (2.3-3.9)  ACS: 1.6 cm         (1.6-3.7) IVSd: 1.1 cm         (0.7-1.2)  LA dimension: 4.9 cm(1.9-4.0) RVDd: 2.6 cm         (0.7-2.4)  FS: 25.8 %          (21-40%)  Ao root diam: 2.3 cm (2.0-3.7)    Comments  Technically adequate study.  Mitral valve is thickened with mild mitral regurgitation.  Tricuspid valve is normal with mild tricuspid  regurgitation.  Calculated right ventricle systolic pressure is about 25 mm of mercury  consistent with mild pulmonary hypertension.  Aortic valve is sclerosed with adequate opening motion.  Left atrium is moderately dilated. Aortic root is normal in size. Right  ventricle is mildly dilated.  LV size and contractility appears normal. EF is about 55%.  No pericardial effusion noted.      Interpretation    MMode/2D Measurements & Calculations  ESV(Teich): 49.1 ml                     % IVS thick: 42.5 %  EF(Teich): 50.8 %                       % LVPW thick: 43.3 %                                          LVOT diam: 1.8 cm  Ao root area: 4.1 cm2  EDV(MOD-sp4): 85.4 ml  ESV(MOD-sp4): 38.5 ml  EF(MOD-sp4): 54.9 %    Doppler Measurements & Calculations  MV E max aba: 112.5 cm/sec                MV max P.9 mmHg  MV A max aba: 95.5 cm/sec                 MV mean P.6 mmHg  MV E/A: 1.2                                            Ao V2 max: 139.6 cm/sec  MV dec slope: 618.6 cm/sec2               Ao max P.8 mmHg  MV dec time: 0.18 sec                     Ao V2 mean: 97.4 cm/sec                                            Ao mean P.2 mmHg                                            Ao V2 VTI: 28.6 cm                                              PURA(I,D): 2.4 cm2                                            PURA(V,D): 2.2 cm2  LV V1 max P.1 mmHg                    PA max PG: 3.6 mmHg  LV V1 mean PG: 3.4 mmHg  LV V1 max: 123.8 cm/sec  LV V1 mean: 89.1 cm/sec  LV V1 VTI: 27.1 cm  TR max aba: 264.9 cm/sec  TR max P.4 mmHg  RVSP(TR): 38.4 mmHg      _______________________________________________________________________________    Electronically signed by: Yuval Choudhary MD  on 2019 07:10  PM         Imaging Results (all)     Procedure Component Value Units Date/Time    NM Lung Ventilation Perfusion Aerosol [[id number]] Collected:  10/19/19 1729     Updated:  10/19/19 174    Narrative:        agfaDATE OF EXAM:  10/19/2019 3:58 PM     PROCEDURE:  NM LUNG VENTILATION PERFUSION AEROSOL-     INDICATIONS:  Other abnormalities of breathing     COMPARISON:  Chest CT 10/19/2019, PA and lateral chest radiograph 10/19/2019 VQ scan  4/12/2019     TECHNIQUE:   The patient was ventilated with 33 mCi of technetium 99m pertechnetate  aerosol and ventilation images were acquired in multiple obliquities.  The patient then received 4.3 mCi of technetium 99m MAA intravenously  and perfusion images were acquired in multiple obliquities.     FINDINGS:  On the ventilation portion of the exam there is heterogeneous  distribution of aerosol likely related to changes of chronic lung  disease. There is no peripheral wedge-shaped mismatched  ventilation/perfusion defects to suggest a high probability study  utilizing the PIOPED criteria. Reduced ventilation/perfusion at the  posterior lung bases bilaterally likely related to pleural effusions and  compressive atelectasis seen on chest CT and radiograph.       Impression:       Low probability study for pulmonary embolus.     Electronically Signed By-Vasile Ludwig On:10/19/2019 5:40 PM  This report was finalized on [id number] by  Vasile Ludwig, .    XR Chest PA & Lateral [[id number]] Collected:  10/19/19 0950     Updated:  10/19/19 0953    Narrative:          DATE OF EXAM:   10/19/2019 9:44 AM     PROCEDURE:   XR CHEST PA AND LATERAL-     INDICATIONS:   elevated D-Dimer 4.59. Not appropriate for CT PE d/t elevated  creatinine; I50.9-Heart failure, unspecified; R79.89-Other specified  abnormal findings of blood chemistry     COMPARISON:  10/16/2019     TECHNIQUE:   [PA and lateral views of the chest were obtained.]     FINDINGS:   There are small-to-moderate bilateral pleural effusions with bibasilar  airspace disease likely compressive atelectasis. Cardiomegaly and  findings of pulmonary edema again noted. No pneumothorax.        Impression:       1. Cardiomegaly and pulmonary  edema, unchanged.  2. No change in small to moderate bilateral pleural effusions with  bibasilar airspace disease.     Electronically Signed By-Vasile Ludwig On:10/19/2019 9:51 AM  This report was finalized on [id number] by  Vasile Ludwig, .    CT Chest Without Contrast [[id number]] Collected:  10/19/19 0901     Updated:  10/19/19 0912    Narrative:          DATE OF EXAM:  10/19/2019 7:47 AM     PROCEDURE:  CT CHEST WO CONTRAST-     INDICATIONS:   Shortness of breath; I50.9-Heart failure, unspecified; R79.89-Other  specified abnormal findings of blood chemistry     COMPARISON:   Portable chest radiograph 10/16/2019     TECHNIQUE:  Routine transaxial slices were obtained through the chest without the  administration of intravenous contrast. Reconstructed coronal and  sagittal images were also obtained. Automated exposure control and  iterative construction methods were used.      FINDINGS:  Visualized lower neck without acute abnormality. Heart mildly enlarged.  Coronary atherosclerotic calcifications noted. No pericardial effusion.  There are several mildly enlarged mediastinal lymph nodes for example  precarinal node measuring 15 mm on image 35 and AP window node measuring  12 mm on image 43. No axillary adenopathy.     The trachea and mainstem bronchi are patent. There are small to moderate  bilateral pleural effusions with compressive lower lobe airspace disease  likely relating to atelectasis. Superimposed pneumonia difficult to  exclude in the right lower lobe given air bronchograms. There is mild  interlobular septal thickening and patchy groundglass opacities within  the left and right upper lobe which may relate to pulmonary edema.     Visualized portions of the liver, spleen, adrenal glands, and pancreas  are without acute abnormality. No free fluid in the upper abdomen.  Multilevel thoracic disc disease with bridging thoracic syndesmophytes.  Negative for acute fracture.       Impression:       1.  Cardiomegaly with mild interlobular septal thickening and scattered  groundglass opacities most consistent with pulmonary edema.  2. Small-to-moderate bilateral pleural effusions with lower lobe  airspace disease likely compressive atelectasis, superimposed pneumonia  difficult to exclude at the right lower lobe.  3. Mildly enlarged mediastinal lymph nodes several of which demonstrate  normal fatty ratna may relate to reactive adenopathy.     Electronically Signed By-Vasile Ludwig On:10/19/2019 9:09 AM  This report was finalized on [id number] by  Vasile Ludwig, .    US Renal Bilateral [[id number]] Collected:  10/17/19 0945     Updated:  10/17/19 0949    Narrative:       Examination: US RENAL BILATERAL-     Date of Exam: 10/17/2019 9:08 AM     Indication: ARF/CRF; I50.9-Heart failure, unspecified; R79.89-Other  specified abnormal findings of blood chemistry.     Comparison: 3/22/2019.     Technique: Grayscale and color Doppler ultrasound evaluation of the  kidneys and urinary bladder was performed     Findings:  Study is mildly limited by the patient's body habitus. Bilateral renal  parenchymal atrophy with diffusely increased bilateral renal  echogenicity, likely reflecting medical renal disease. No solid renal  mass, shadowing stone, or hydronephrosis. The right kidney measures 9.8  cm in length. The left kidney measures 8.1 cm in length. Limited imaging  of the urinary bladder is unremarkable.       Impression:       Bilateral renal parenchymal atrophy and diffusely increased bilateral  renal echogenicity, likely reflecting medical renal disease.     Electronically Signed By-Natalio Chanel On:10/17/2019 9:46 AM  This report was finalized on [id number] by  Natalio Chanel, .    XR Chest 1 View [[id number]] Collected:  10/17/19 0715     Updated:  10/17/19 0719    Narrative:       DATE OF EXAM:  10/16/2019 11:55 PM     PROCEDURE:  XR CHEST 1 VW-     INDICATIONS:  soa     COMPARISON:  Radiograph 4/11/2019, 3/22/2019, and  6/1/2017.     TECHNIQUE:   Single radiographic AP view of the chest was obtained.     FINDINGS:  The study is limited by lordotic patient positioning. Overlying  artifacts. Small to moderate bilateral pleural effusions with basilar  predominant opacification of both lungs. Indistinct pulmonary  vasculature. No pneumothorax. Stable cardiomegaly, likely accentuated by  technique. Calcified atherosclerotic disease in the thoracic aorta.  Partially visualized thoracic spondylosis.        Impression:       Limited study demonstrating stable cardiomegaly, suspected moderate  pulmonary edema, and small to moderate bilateral pleural effusions with  underlying bibasilar atelectasis and/or pneumonia.     Electronically Signed By-Natalio hCanel On:10/17/2019 7:17 AM  This report was finalized on [id number] by  Natalio Chanel, .            Condition on Discharge:  Stable, chronically ill     Vital Signs  Temp:  [97.9 °F (36.6 °C)-98 °F (36.7 °C)] 97.9 °F (36.6 °C)  Heart Rate:  [57-68] 57  Resp:  [16-18] 18  BP: (117-127)/(60-70) 125/64    Physical Exam:  Physical Exam   Constitutional: No distress.   Eyes: Pupils are equal, round, and reactive to light.   Cardiovascular: Normal rate.   Pulmonary/Chest: No respiratory distress.   Abdominal: Soft.   Neurological: She is alert.   Skin: Skin is warm.       Discharge Disposition  Skilled Nursing Facility (DC - External)    Discharge Medications     Discharge Medications      New Medications      Instructions Start Date   cefdinir 300 MG capsule  Commonly known as:  OMNICEF   300 mg, Oral, 2 Times Daily         Changes to Medications      Instructions Start Date   bumetanide 2 MG tablet  Commonly known as:  BUMEX  What changed:  when to take this   2 mg, Oral, 3 Times Daily      pantoprazole 40 MG EC tablet  Commonly known as:  PROTONIX  What changed:    · medication strength  · how much to take   40 mg, Oral, Daily         Continue These Medications      Instructions Start Date    acetaminophen 325 MG tablet  Commonly known as:  TYLENOL   325 mg, Oral, Every 6 Hours PRN      ammonium lactate 12 % cream  Commonly known as:  AMLACTIN   1 application, Topical, Daily      aspirin 81 MG chewable tablet   81 mg, Oral, Daily      atorvastatin 10 MG tablet  Commonly known as:  LIPITOR   10 mg, Oral, Daily      bisacodyl 5 MG EC tablet  Commonly known as:  DULCOLAX   5 mg, Oral, Every Other Day      busPIRone 7.5 MG tablet  Commonly known as:  BUSPAR   Every 8 Hours      docusate sodium 100 MG capsule  Commonly known as:  COLACE   100 mg, Oral, 2 Times Daily      doxycycline 100 MG capsule  Commonly known as:  VIBRAMYCIN   100 mg, Oral, 2 Times Daily      ferrous sulfate 325 (65 FE) MG tablet   325 mg, Oral, Daily      gabapentin 300 MG capsule  Commonly known as:  NEURONTIN   300 mg, Oral, 2 Times Daily      hydrALAZINE 25 MG tablet  Commonly known as:  APRESOLINE   25 mg, Oral, 3 Times Daily      Insulin Glargine 100 UNIT/ML injection pen  Commonly known as:  BASAGLAR KWIKPEN   30 Units, Subcutaneous, 2 Times Daily      isosorbide mononitrate 60 MG 24 hr tablet  Commonly known as:  IMDUR   120 mg, Oral, Daily      ketotifen 0.025 % ophthalmic solution  Commonly known as:  ZADITOR   1 drop, Both Eyes, 2 Times Daily      levothyroxine 112 MCG tablet  Commonly known as:  SYNTHROID, LEVOTHROID   112 mcg, Oral, Daily      linagliptin 5 MG tablet tablet  Commonly known as:  TRADJENTA   5 mg, Oral, Daily      magnesium hydroxide 2400 MG/10ML suspension suspension  Commonly known as:  MILK OF MAGNESIA   30 mL, Oral, Daily PRN      Melatonin 3 MG capsule   3 mg, Oral, Daily      methadone 10 MG tablet  Commonly known as:  DOLOPHINE   10 mg, Oral, 3 Times Daily      metoprolol tartrate 25 MG tablet  Commonly known as:  LOPRESSOR   25 mg, Oral, 2 Times Daily      MYRBETRIQ 25 MG tablet sustained-release 24 hour 24 hr tablet  Generic drug:  Mirabegron ER   25 mg, Oral, Daily      ondansetron 4 MG  tablet  Commonly known as:  ZOFRAN   4 mg, Oral, Every 6 Hours PRN      polyethylene glycol packet  Commonly known as:  MIRALAX   17 g, Oral, Daily      ULORIC 40 MG tablet  Generic drug:  febuxostat   40 mg, Oral, Daily      vitamin C 500 MG tablet  Commonly known as:  ASCORBIC ACID   1,000 mg, Oral, Daily      vitamin D 64616 units capsule capsule  Commonly known as:  ERGOCALCIFEROL   50,000 Units, Oral, Weekly      zinc oxide 20 % ointment   1 application, Topical, 2 Times Daily      ZOLOFT 25 MG tablet  Generic drug:  sertraline   25 mg, Oral, Daily         Stop These Medications    cefTRIAXone 1 g injection  Commonly known as:  ROCEPHIN            Discharge Diet:   Diet Instructions     Diet: Regular      Discharge Diet:  Regular          Activity at Discharge:   Activity Instructions     Activity as Tolerated            Follow-up Appointments  Future Appointments   Date Time Provider Department Center   7/27/2020  2:20 PM Joel Zelaya MD [facility]     Additional Instructions for the Follow-ups that You Need to Schedule     Call MD With Problems / Concerns   As directed      Instructions: Call [phone] or email [email or web address] for problems or concerns.    Order Comments:  Instructions: Call [phone] or email [email or web address] for problems or concerns.          Discharge Follow-up with PCP   As directed       Currently Documented PCP:    Chris Remy MD    PCP Phone Number:    [phone]     Follow Up Details:  1 week         Discharge Follow-up with PCP   As directed       Currently Documented PCP:    Chris Remy MD    PCP Phone Number:    [phone]     Follow Up Details:  2 weeks         Discharge Follow-up with Specified Provider: Gastroenterology; 2 Weeks   As directed      To:  Gastroenterology    Follow Up:  2 Weeks         Discharge Follow-up with Specified Provider: Nephrology; 1 Week   As directed      To:  Nephrology    Follow Up:   1 Week         Discharge Follow-up with Specified Provider: Nephrology; 2 Weeks   As directed      To:  Nephrology    Follow Up:  2 Weeks         Discharge Follow-up with Specified Provider: nephrology; 2 Weeks   As directed      To:  nephrology    Follow Up:  2 Weeks         Basic Metabolic Panel    Oct 28, 2019 (Approximate)            Test Results Pending at Discharge   Order Current Status    Blood Culture - Blood, Arm, Left Preliminary result    Blood Culture - Blood, Hand, Right Preliminary result           Risk for Readmission (LACE) Score: 10 (10/21/2019  6:00 AM)          Time: Discharge 34 min with face-to-face history exam, writing of prescriptions, and documenting discharge data including care coordination.      Agapito Carranza DO  10/21/19  11:05 AM

## 2019-10-21 NOTE — PROGRESS NOTES
"NEPHROLOGY PROGRESS NOTE------[facility]    [facility]  [phone]  Ivanna Sesay MD      Patient Care Team:  Chris Remy MD as PCP - General (Geriatric Medicine)  Diana Sesay MD as PCP - Claims Attributed  Adry Bear MD as PCP - Family Medicine      Provider:  Ivanna Sesay MD  Patient Name: Amanda Combs  :  1935    SUBJECTIVE:  Diuresing well. Will increase home oral diuretics.   Negative for CP and her SOA is much improved.     Medication:    ammonium lactate 1 application Topical Daily   aspirin 81 mg Oral Daily   atorvastatin 10 mg Oral Daily   bisacodyl 5 mg Oral Every Other Day   ceftriaxone 1 g Intravenous Q24H   cholecalciferol 1,000 Units Oral Daily   docusate sodium 100 mg Oral BID   febuxostat 40 mg Oral Daily   ferrous sulfate 324 mg Oral Daily With Breakfast   furosemide 40 mg Intravenous Q8H   gabapentin 300 mg Oral Nightly   hydrALAZINE 25 mg Oral TID   insulin glargine 30 Units Subcutaneous Nightly   insulin lispro 0-9 Units Subcutaneous 4x Daily With Meals & Nightly   isosorbide mononitrate 120 mg Oral Daily   ketotifen 1 drop Both Eyes BID   levothyroxine 112 mcg Oral Daily   linagliptin 5 mg Oral Daily   methadone 10 mg Oral TID   metoprolol tartrate 25 mg Oral Q12H   miconazole  Topical Q12H   oxybutynin XL 5 mg Oral Daily   pantoprazole 40 mg Oral Daily   polyethylene glycol 17 g Oral Daily   sertraline 25 mg Oral Daily   sodium chloride 10 mL Intravenous Q12H   vitamin C 1,000 mg Oral Daily   zinc oxide 1 application Topical BID          OBJECTIVE    Vital Sign Min/Max for last 24 hours  Temp  Min: 97.9 °F (36.6 °C)  Max: 98 °F (36.7 °C)   BP  Min: 117/70  Max: 128/71   Pulse  Min: 57  Max: 68   Resp  Min: 16  Max: 18   SpO2  Min: 97 %  Max: 97 %   No Data Recorded   No Data Recorded     Flowsheet Rows      First Filed Value   Admission Height  152.4 cm (60\") Documented at 10/16/2019 " 2312   Admission Weight  101 kg (222 lb) Documented at 10/16/2019 2312          I/O this shift:  In: 480 [P.O.:480]  Out: -   I/O last 3 completed shifts:  In: 520 [P.O.:520]  Out: 500 [Urine:500]    Physical Exam:  General Appearance: alert, appears stated age and cooperative  Head: normocephalic, without obvious abnormality and atraumatic  Eyes: conjunctivae and sclerae normal and no icterus  Neck: supple and no JVD  Lungs: clear to auscultation and respirations regular  Heart: regular rhythm & normal rate and normal S1, S2  Chest: Wall no abnormalities observed  Abdomen: normal bowel sounds and soft non-tender  Extremities: moves extremities well, 1+ edema, no cyanosis and no redness  Skin: no bleeding, bruising or rash, turgor normal, color normal and no leasions noted  Neurologic: Alert, and oriented. No focal deficits    Labs:    WBC WBC   Date Value Ref Range Status   10/21/2019 9.10 3.40 - 10.80 10*3/mm3 Final   10/20/2019 8.10 3.40 - 10.80 10*3/mm3 Final   10/19/2019 9.00 3.40 - 10.80 10*3/mm3 Final     Comment:     Result checked       HGB Hemoglobin   Date Value Ref Range Status   10/21/2019 8.6 (L) 12.0 - 15.9 g/dL Final   10/20/2019 9.0 (L) 12.0 - 15.9 g/dL Final   10/19/2019 8.4 (L) 12.0 - 15.9 g/dL Final      HCT Hematocrit   Date Value Ref Range Status   10/21/2019 27.3 (L) 34.0 - 46.6 % Final   10/20/2019 27.8 (L) 34.0 - 46.6 % Final   10/19/2019 26.7 (L) 34.0 - 46.6 % Final      Platlets No results found for: LABPLAT   MCV MCV   Date Value Ref Range Status   10/21/2019 89.4 79.0 - 97.0 fL Final   10/20/2019 87.6 79.0 - 97.0 fL Final   10/19/2019 87.7 79.0 - 97.0 fL Final          Sodium Sodium   Date Value Ref Range Status   10/21/2019 143 136 - 145 mmol/L Final   10/20/2019 143 136 - 145 mmol/L Final   10/19/2019 142 136 - 145 mmol/L Final   10/19/2019 142 136 - 145 mmol/L Final      Potassium Potassium   Date Value Ref Range Status   10/21/2019 4.3 3.5 - 5.2 mmol/L Final   10/20/2019 4.2 3.5 -  5.2 mmol/L Final   10/19/2019 4.3 3.5 - 5.2 mmol/L Final   10/19/2019 4.5 3.5 - 5.2 mmol/L Final      Chloride Chloride   Date Value Ref Range Status   10/21/2019 98 98 - 107 mmol/L Final   10/20/2019 101 98 - 107 mmol/L Final   10/19/2019 101 98 - 107 mmol/L Final   10/19/2019 100 98 - 107 mmol/L Final      CO2 CO2   Date Value Ref Range Status   10/21/2019 33.0 (H) 22.0 - 29.0 mmol/L Final   10/20/2019 33.0 (H) 22.0 - 29.0 mmol/L Final   10/19/2019 29.0 22.0 - 29.0 mmol/L Final   10/19/2019 30.0 (H) 22.0 - 29.0 mmol/L Final      BUN BUN   Date Value Ref Range Status   10/21/2019 76 (H) 8 - 23 mg/dL Final   10/20/2019 67 (H) 8 - 23 mg/dL Final   10/19/2019 66 (H) 8 - 23 mg/dL Final   10/19/2019 66 (H) 8 - 23 mg/dL Final      Creatinine Creatinine   Date Value Ref Range Status   10/21/2019 2.75 (H) 0.57 - 1.00 mg/dL Final   10/20/2019 2.59 (H) 0.57 - 1.00 mg/dL Final   10/19/2019 2.61 (H) 0.57 - 1.00 mg/dL Final   10/19/2019 2.64 (H) 0.57 - 1.00 mg/dL Final      Calcium Calcium   Date Value Ref Range Status   10/21/2019 8.7 8.6 - 10.5 mg/dL Final   10/20/2019 8.7 8.6 - 10.5 mg/dL Final   10/19/2019 8.8 8.6 - 10.5 mg/dL Final   10/19/2019 8.6 8.6 - 10.5 mg/dL Final      PO4 No components found for: PO4   Albumin Albumin   Date Value Ref Range Status   10/21/2019 3.10 (L) 3.50 - 5.20 g/dL Final   10/20/2019 3.30 (L) 3.50 - 5.20 g/dL Final   10/19/2019 3.50 3.50 - 5.20 g/dL Final      Magnesium Magnesium   Date Value Ref Range Status   10/19/2019 1.9 1.6 - 2.4 mg/dL Final      Uric Acid No components found for: URIC ACID     Imaging Results (last 72 hours)     Procedure Component Value Units Date/Time    US Renal Bilateral [[id number]] Collected:  10/17/19 0945     Updated:  10/17/19 0949    Narrative:       Examination: US RENAL BILATERAL-     Date of Exam: 10/17/2019 9:08 AM     Indication: ARF/CRF; I50.9-Heart failure, unspecified; R79.89-Other  specified abnormal findings of blood chemistry.     Comparison:  3/22/2019.     Technique: Grayscale and color Doppler ultrasound evaluation of the  kidneys and urinary bladder was performed     Findings:  Study is mildly limited by the patient's body habitus. Bilateral renal  parenchymal atrophy with diffusely increased bilateral renal  echogenicity, likely reflecting medical renal disease. No solid renal  mass, shadowing stone, or hydronephrosis. The right kidney measures 9.8  cm in length. The left kidney measures 8.1 cm in length. Limited imaging  of the urinary bladder is unremarkable.       Impression:       Bilateral renal parenchymal atrophy and diffusely increased bilateral  renal echogenicity, likely reflecting medical renal disease.     Electronically Signed By-Natalio Chanel On:10/17/2019 9:46 AM  This report was finalized on [id number] by  Natalio Chanel, .    XR Chest 1 View [[id number]] Collected:  10/17/19 0715     Updated:  10/17/19 0719    Narrative:       DATE OF EXAM:  10/16/2019 11:55 PM     PROCEDURE:  XR CHEST 1 VW-     INDICATIONS:  soa     COMPARISON:  Radiograph 4/11/2019, 3/22/2019, and 6/1/2017.     TECHNIQUE:   Single radiographic AP view of the chest was obtained.     FINDINGS:  The study is limited by lordotic patient positioning. Overlying  artifacts. Small to moderate bilateral pleural effusions with basilar  predominant opacification of both lungs. Indistinct pulmonary  vasculature. No pneumothorax. Stable cardiomegaly, likely accentuated by  technique. Calcified atherosclerotic disease in the thoracic aorta.  Partially visualized thoracic spondylosis.        Impression:       Limited study demonstrating stable cardiomegaly, suspected moderate  pulmonary edema, and small to moderate bilateral pleural effusions with  underlying bibasilar atelectasis and/or pneumonia.     Electronically Signed ByDelphine Chanel On:10/17/2019 7:17 AM  This report was finalized on [id number] by  Natalio Chanel, .          Results for orders placed during the hospital  encounter of 10/16/19   XR Chest PA & Lateral    Narrative    DATE OF EXAM:   10/19/2019 9:44 AM     PROCEDURE:   XR CHEST PA AND LATERAL-     INDICATIONS:   elevated D-Dimer 4.59. Not appropriate for CT PE d/t elevated  creatinine; I50.9-Heart failure, unspecified; R79.89-Other specified  abnormal findings of blood chemistry     COMPARISON:  10/16/2019     TECHNIQUE:   [PA and lateral views of the chest were obtained.]     FINDINGS:   There are small-to-moderate bilateral pleural effusions with bibasilar  airspace disease likely compressive atelectasis. Cardiomegaly and  findings of pulmonary edema again noted. No pneumothorax.        Impression 1. Cardiomegaly and pulmonary edema, unchanged.  2. No change in small to moderate bilateral pleural effusions with  bibasilar airspace disease.     Electronically Signed By-Vasile Ludwig On:10/19/2019 9:51 AM  This report was finalized on [id number] by  Vasile Ludwig .   XR Chest 1 View    Narrative DATE OF EXAM:  10/16/2019 11:55 PM     PROCEDURE:  XR CHEST 1 VW-     INDICATIONS:  soa     COMPARISON:  Radiograph 4/11/2019, 3/22/2019, and 6/1/2017.     TECHNIQUE:   Single radiographic AP view of the chest was obtained.     FINDINGS:  The study is limited by lordotic patient positioning. Overlying  artifacts. Small to moderate bilateral pleural effusions with basilar  predominant opacification of both lungs. Indistinct pulmonary  vasculature. No pneumothorax. Stable cardiomegaly, likely accentuated by  technique. Calcified atherosclerotic disease in the thoracic aorta.  Partially visualized thoracic spondylosis.        Impression Limited study demonstrating stable cardiomegaly, suspected moderate  pulmonary edema, and small to moderate bilateral pleural effusions with  underlying bibasilar atelectasis and/or pneumonia.     Electronically Signed By-Natalio Chanel On:10/17/2019 7:17 AM  This report was finalized on [id number] by  Natalio Chanel, .       Results for orders  placed during the hospital encounter of 19   Duplex Venous Lower Extremity - Bilateral CAR    Narrative                   Lower Extremity Venous Report                          [facility]                         Vascular Laboratory                            [address] [place] [address]    Name: NIGEL QUINTANILLA              Study Date: 2019 01:46 PM  MRN: [id number]                       Patient Location: OU  : 1935 (M/d/yyyy)           Gender: Female  Age: 83 yrs                          Account#: [id number]  Reason For Study: edema      Procedure  Venous study was carried out in bilateral lower extremities. Gray scale, color  flow, and spectral doppler images were obtained. Images were obtained in  transverse and longitudinal views.    Right Lower Extremity Venous  On the right side the patient has patent common femoral, femoral, and  popliteal veins. The main veins are easily compressible. Femoral vein was  mapped in its entirety through the course of the thigh. It is patent and  compresses well. The popliteal vein is patent along with its tributaries and  compresses well with no evidence of any filling defect. Augmentation test is  positive. Respiratory waves are biphasic. Distal veins are patent. The right  greater and small saphenous veins are patent with good compressibility. There  is fluid retention noted, suggestive of edema. There is a cystic fluid  retention behind the right knee which could be a Baker's cyst.    Left Lower Extremity Venous  On the left side the patient has patent common femoral, femoral, and popliteal  veins. The main veins compress well. Femoral vein was mapped in its entirety  through the course of the thigh. It is patent and compresses well. The  popliteal vein is patent along with its tributaries and compresses well with  no evidence of any filling defect. Augmentation test is positive. Respiratory  waves are biphasic.  Distal veins are patent. The left greater and small  saphenous veins are patent with good compressibility. There is fluid retention  noted, suggestive of edema. There is a lymph node noted in the left groin.      Interpretation Summary  No evidence of any deep vein thrombosis or superficial vein thrombosis.  _______________________________________________________________________________    Electronically signed by: Alejandro Escobar MD  on 04/12/2019 04:36 PM    Ordering Physician: ELAINE SESAY  Referring Physician: ANA LILIA MEJÍA  Performed By:            ASSESSMENT / PLAN      Pressure injury of sacral region, stage 2 (CMS/HCC)    1. PAUL/CKD3------Nonoliguric. +ARF/PAUL on top of known CRF/CKD STG 3, with a baseline serum creatinine of 2.1. 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 +ARF/PAUL secondary to decompensated CHF/CP state. Diurese. Avoid hypotension. Check urine and serum studies and renal US. No NSAIDs or IV dye. Follow for dialysis need     2. 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 Check TSH. Follow     3. HTN WITH CKD STG 3------BP okay. Avoid hypotension. No ACE/ARB/DRI     4. ANEMIA OF CKD------Check Fe and hemoccult. Follow for PRBC and/or EPO/iron need     5. DMII WITH RENAL MANIFESTATIONS-------Glucometers, SSI. BS okay     6. OA/DJD------No NSAIDs. Check uric acid level     7. GERD------ON PPI. Aware or risks with regards to CKD     8. HYPERLIPIDEMIA------On Statin. Check CK, TSH     9. VITAMIN D DEFICIENCY------On supplementation.    OK TO D/C PER RENAL STANDPOINT. BMP IN ONE WEEK. F/U IN OFFICE IN 2 WEEKS.     Ivanna Sesay MD  [facility]  [phone]  10/21/19  10:40 AM

## 2019-10-22 NOTE — PROGRESS NOTES
Discharge Planning Assessment   [place]     Patient Name: Amanda Combs  MRN: [id number]  Today's Date: 10/22/2019    Admit Date: 10/16/2019       Row Name 10/22/19 0715       Plan    Final Discharge Disposition Code  04 - intermediate care facility    Final Note  long term care at [facility] and rehab       Carol naegele rn  Case management  Office number [phone]  Cell phone [phone]

## 2019-10-24 PROBLEM — R53.1 WEAKNESS: Status: ACTIVE | Noted: 2019-01-01

## 2019-12-12 PROBLEM — R41.82 ALTERED MENTAL STATUS: Status: ACTIVE | Noted: 2019-01-01

## 2019-12-12 NOTE — H&P
[facility]   HISTORY AND PHYSICAL    Patient Name: Amanda Combs  : 1935  MRN: [id number]  Primary Care Physician: Chris Remy MD  Date of admission: 2019      Subjective   Subjective     Chief Complaint: pain in the stomach, fever    HPI:  Amanda Combs is a 84 y.o. female with PMH of ESRD not on HD, CAD, CHF, HTN, morbid obesity, chronic pain on methadone, cognitive impairment, chronic debility who was recently discharged on hospice at NH. Per daughter she has been doing relatively well but developed abdominal pain and fever yesterday, so brought into our ER.     Work up- shows positive UA. CT c/w obstructive ureter stone , proximal 6.6mm, mild hydronephrosis an hydroureter. Cr remains close to her baseline.   On admission, mild tachycardia 90s, , afebrile.   Labs- leukocytosis [id number] with bandemia and L shift    Pt keeps eyes closed, not participating in conversation. Most of information was given by daughter who states she is wc bound, has been eating decently and still taking some of her medications, followed by renal .    Daughter states she will continue on comfort measure and hospice but wants to try abx and same in no CPR, no Bipap, no surgical procedure.       Review of Systems   Constitutional: Positive for fatigue and fever. Negative for activity change.   HENT: Negative.    Eyes: Negative.    Respiratory: Negative.    Cardiovascular: Positive for leg swelling.   Gastrointestinal: Positive for abdominal pain.   Endocrine: Negative.    Genitourinary: Positive for flank pain.   Musculoskeletal: Positive for back pain.   Skin: Negative.    Allergic/Immunologic: Negative.    Neurological: Positive for weakness.   Hematological: Negative.    Psychiatric/Behavioral: Positive for confusion.              Personal History     Past Medical History:   Diagnosis Date   • Altered mental status 2019   • CHF (congestive heart failure) (CMS/[facility])    • Coronary artery  disease    • Diabetes mellitus (CMS/H[facility])    • Disease of thyroid gland    • Elevated cholesterol    • GERD (gastroesophageal reflux disease)    • History of transfusion    • Hyperlipidemia    • Hypertension    • PONV (postoperative nausea and vomiting)    • UTI (urinary tract infection) 12/12/2019       Past Surgical History:   Procedure Laterality Date   • ABDOMINAL SURGERY     • BACK SURGERY     • CARDIAC CATHETERIZATION     • COLONOSCOPY     • EYE SURGERY     • HYSTERECTOMY         Family History: family history is not on file. Otherwise pertinent FHx was reviewed and unremarkable.     Social History:  reports that she has quit smoking. She does not have any smokeless tobacco history on file. She reports that she does not drink alcohol or use drugs.      Medications:  Medications Prior to Admission   Medication Sig Dispense Refill Last Dose   • bisacodyl (DULCOLAX) 5 MG EC tablet Take 10 mg by mouth Daily As Needed. At bedtime   10/23/2019 at 20:00   • busPIRone (BUSPAR) 10 MG tablet Take 10 mg by mouth 3 (Three) Times a Day.      • furosemide (LASIX) 80 MG tablet Take 1 tablet by mouth Daily.      • hyoscyamine (ANASPAZ,LEVSIN) 0.125 MG tablet Take 0.125 mg by mouth Every 6 (Six) Hours As Needed for Cramping.      • insulin aspart (novoLOG FLEXPEN) 100 UNIT/ML solution pen-injector sc pen Inject 10 Units under the skin into the appropriate area as directed Daily.      • insulin aspart (novoLOG) 100 UNIT/ML injection Inject 10 Units under the skin into the appropriate area as directed 3 (Three) Times a Day After Meals.      • ipratropium-albuterol (DUO-NEB) 0.5-2.5 mg/3 ml nebulizer Take 3 mL by nebulization Every 2 (Two) Hours As Needed for Wheezing or Shortness of Air. 360 mL     • melatonin 3 MG tablet Take 3 mg by mouth Every Night.   10/23/2019 at Unknown time   • methadone (DOLOPHINE) 10 MG tablet Take 10 mg by mouth 3 (Three) Times a Day,      • morphine 20 MG/5ML solution Take 1.3 mL by mouth Every 2  (Two) Hours As Needed for Severe Pain . 100 mL 0    • ondansetron (ZOFRAN) 4 MG tablet Take 4 mg by mouth Every 4 (Four) Hours As Needed for Nausea or Vomiting.   10/24/2019 at Unknown time   • polyethylene glycol (MIRALAX) packet Take 17 g by mouth Daily As Needed.   10/24/2019 at Unknown time   • sennosides-docusate (SENEXON-S) 8.6-50 MG per tablet Take 2 tablets by mouth every night at bedtime.      • sertraline (ZOLOFT) 25 MG tablet Take 50 mg by mouth Daily.   10/24/2019 at Unknown time   • Sodium Phosphates (FLEET ENEMA) 7-19 GM/118ML enema Insert 1 enema into the rectum Daily As Needed.      • triamcinolone (KENALOG) 0.1 % cream Apply 1 application topically to the appropriate area as directed 2 (Two) Times a Day.      • sodium chloride 0.65 % nasal spray 2 sprays into the nostril(s) as directed by provider As Needed for Congestion.  12            Allergies   Allergen Reactions   • Bactrim [Sulfamethoxazole-Trimethoprim] Unknown (See Comments)     Patient confused, unable to tell reaction to medication at this time       Objective   Objective     Vital Signs:   Temp:  [98.5 °F (36.9 °C)-102.2 °F (39 °C)] 99.7 °F (37.6 °C)  Heart Rate:  [] 117  Resp:  [15-24] 16  BP: (106-154)/(42-85) 124/79        Physical Exam   Constitutional: She appears well-nourished. She appears distressed.   HENT:   Head: Normocephalic and atraumatic.   Mouth/Throat: No oropharyngeal exudate.   Eyes: Pupils are equal, round, and reactive to light. Conjunctivae and EOM are normal. No scleral icterus.   Neck: Normal range of motion. Neck supple. No JVD present. No thyromegaly present.   Cardiovascular: Normal rate and regular rhythm.   Murmur heard.  Pulmonary/Chest: Effort normal and breath sounds normal.   Abdominal: Soft. Bowel sounds are normal. She exhibits no distension. There is no tenderness. There is no guarding.   Musculoskeletal: She exhibits edema.   Neurological:   Decreased in response   Skin: Skin is warm and dry.    Psychiatric:   Unable to assess due to diminished alertness          Results Reviewed:  I have personally reviewed most recent cardiac tracings, lab results, microbiology results and radiology images and interpretations and agree with findings, most notably:  .      Results from last 7 days   Lab Units 12/12/19  1039 12/12/19  0406   WBC 10*3/mm3 23.50* 22.40*   HEMOGLOBIN g/dL 10.5* 11.1*   HEMATOCRIT % 33.3* 34.8   PLATELETS 10*3/mm3 100* 114*   INR   --  1.03     Results from last 7 days   Lab Units 12/12/19  1039  12/12/19  0406   SODIUM mmol/L 137  --  138   POTASSIUM mmol/L 4.2  --  3.8   CHLORIDE mmol/L 98  --  97*   CO2 mmol/L 24.0  --  27.0   BUN mg/dL 53*  --  50*   CREATININE mg/dL 2.79*  --  2.60*   GLUCOSE mg/dL 271*  --  292*   CALCIUM mg/dL 8.4*  --  9.1   ALT (SGPT) U/L  --   --  25   AST (SGOT) U/L  --   --  42*   TROPONIN T ng/mL  --   --  0.067*   PROBNP pg/mL  --   --  1,807.0*   LACTATE mmol/L 3.2*   < >  --     < > = values in this interval not displayed.     Estimated Creatinine Clearance: 16.1 mL/min (A) (by C-G formula based on SCr of 2.79 mg/dL (H)).  Brief Urine Lab Results  (Last result in the past 365 days)      Color   Clarity   Blood   Leuk Est   Nitrite   Protein   CREAT   Urine HCG        12/12/19 0419 Yellow Turbid  Comment:  Result checked  Large (3+) Large (3+) Negative >=300 mg/dL (3+)             Imaging Results (Last 24 Hours)     Procedure Component Value Units Date/Time    XR Chest 1 View [[id number]] Collected:  12/12/19 0802     Updated:  12/12/19 0806    Narrative:       DATE OF EXAM:  12/12/2019 4:31 AM     PROCEDURE:  XR CHEST 1 VW-     INDICATIONS:  Severe Sepsis Protocol       COMPARISON:  AP portable chest 10/25/2019.     TECHNIQUE:   Single radiographic view of the chest was obtained.     FINDINGS:  Low-volume inspiration. Stable cardiac enlargement. Fine central  interstitial prominence suggestive of interstitial edema is present in  the lung bases. There is  improved aeration of left lower lobe with  better delineation of the diaphragmatic margin. Suspected medial  bibasilar atelectatic changes. Right IJ dialysis catheter has been  removed. No pneumothorax is seen. Degenerative endplate spurring is  present in the thoracic spine.       Impression:       Features suggesting mild interstitial pulmonary edema persists. However,  there is improved left basilar aeration suggesting reexpansion of  atelectasis. Mild medial bibasilar atelectatic changes remain.     Right IJ introducer central line removal. No visible pneumothorax.     Electronically Signed By-Dr. Saima Mckeon MD On:12/12/2019 8:04 AM  This report was finalized on [id number] by Dr. Saima Mckeon MD.    CT Abdomen Pelvis Without Contrast [[id number]] Collected:  12/12/19 0442     Updated:  12/12/19 0655    Narrative:       EXAMINATION:  CT SCAN OF THE ABDOMEN AND PELVIS WITHOUT INTRAVENOUS CONTRAST    DATE OF EXAM: 12/12/2019 6:16 AM     HISTORY: 84-year-old with sepsis and abdominal pain.    COMPARISON: None.    TECHNIQUE: CT examination of the abdomen and pelvis with sagittal and coronal reformations was performed without intravenous contrast.  CT dose lowering techniques were used, to include: automated exposure control, adjustment for patient size, and/or use   of iterative reconstruction.    Contrast: None.     Note: The exam is limited because some types of pathology may not be adequately demonstrated due to lack of contrast enhancement.       FINDINGS:    ABDOMEN/PELVIS:    Lower Chest: Airspace opacity in the right lower lobe.     Free Air:  None.     Liver:  Normal    Gallbladder: Removed    Common Bile Duct:  Normal    Pancreas: Fatty infiltration of the pancreas.    Spleen: Normal    Adrenal Glands: Normal    Kidneys:   Right Kidney: There is thinning of the renal cortex with trace hydronephrosis and proximal hydroureter due to a stone in the proximal ureter that measures 6.6 mm.   Left Kidney:  There is atrophy of the left kidney with thinning of the renal cortex. There is an isodense region seen in the anterior lateral left kidney that measures 3.1 x 2.5 cm seen on axial image 57 which is incompletely characterized on this   noncontrast exam.  Left Ureter: Portions of the left ureter which are visualized measure within normal limits.    GI Tract:  Stomach: Normal  Small Bowel: Normal  Appendix: Normal on axial image 114  Large Bowel: Scattered diverticulosis.    Mesentery/Peritoneum: Normal    Vasculature: Normal    Lymph Nodes: Normal    Abdominal Wall: Postoperative changes are demonstrated from hernia repair.    Bladder: There is an air-fluid level in the bladder.    Reproductive: Not visualized.    Musculoskeletal: There is multilevel degenerative disc disease     Free Fluid: None.        Impression:       1. Right-sided mild to moderate hydronephrosis due to a 7 mm proximal ureteral stone.  2. Normal appendix.  3. Scattered diverticulosis.  4. Air-fluid level in the bladder. Please correlate for airforming bacteria versus recent intervention.  5. Atrophy of the left kidney with question of a mass seen in the lateral anterior aspect of the left kidney on axial image 57,measures up to 3 cm.  6. Fatty infiltration of the pancreas.  7. Status post cholecystectomy.  8. Airspace opacity in the right lower lobe with air bronchograms. Please correlate for pneumonia versus atelectasis. Short-term follow-up after antibiotic therapy to exclude underlying mass/nodule is recommended.    Electronically signed by:  Rafaela Huggins M.D.    12/12/2019 4:54 AM             Assessment/Plan   Assessment / Plan     Brief Patient Summary:  Amanda Combs is a 84 y.o. female who is from NH with fever and abdominal pain. PMH of ESRD not on HD, HTN, HLD, dementia, chronic debility, DM    Active Hospital Problems:  No notes have been filed under this hospital service.  Service: Hospitalist    // sepsis due to UTI,  complicated  ; cefepime for now  ; no fluid since stable in vs and limited intervention per daughter    // complicated UTI due to obstructive prox ureter stone  ; no surgical intervention since hospice care    // chronic pain on methadone  ; continue current pain jessica      // DM  ; SSI        DVT prophylaxis:  No due to hospice intervention    CODE STATUS:         Code Status and Medical Interventions:   Ordered at: 12/12/19 1220     Level Of Support Discussed With:    Health Care Surrogate     Code Status:    No CPR     Medical Interventions (Level of Support Prior to Arrest):    Comfort Measures     Comments:    no bipap, comfort measure only and current medications and antibiotcs       Admission Status:  I believe this patient meets inpatient  criteria.    Electronically signed by Florina Marques MD, 12/12/19, 12:20 PM.

## 2019-12-12 NOTE — ED PROVIDER NOTES
Subjective   84-year-old female presents with family at bedside for a decline in her responsiveness.  Daughter at bedside who is her POA reports that patient was placed on hospice related to her renal failure.  She had previously been seen by nephrology and they had attempted dialysis and patient was unable to tolerate.  Patient's daughter reports that they received a phone call at 2 AM related to patient's fever.  Patient's daughter, Madina White is her POA and reports that they do not want any invasive procedures done.  She reports that she is a DO NOT RESUSCITATE.    1. Location: abdominal pain  2. Quality: unable to relate  3. Severity: moderate  4. Worsening factors: denies  5. Alleviating factors: denies  6. Onset: 0200  7. Radiation: unable to relate  8. Frequency: constant   9. Co-morbidities: CHF, CAD, diabetes mellitus, thyroid disease, hyperlipidemia, GERD, hypertension  10. Source: patient's daughter            Review of Systems   Constitutional: Negative for appetite change, chills, diaphoresis and fever.   HENT: Negative for ear discharge and rhinorrhea.    Respiratory: Negative for chest tightness and shortness of breath.    Cardiovascular: Negative for chest pain and leg swelling.   Gastrointestinal: Positive for abdominal pain. Negative for blood in stool, constipation, diarrhea, nausea and vomiting.   Genitourinary: Negative for decreased urine volume, difficulty urinating, flank pain and hematuria.   Skin: Negative for pallor and rash.   Neurological: Positive for tremors. Negative for seizures and headaches.   Hematological: Negative for adenopathy.   Psychiatric/Behavioral: Positive for confusion.   All other systems reviewed and are negative.      Past Medical History:   Diagnosis Date   • CHF (congestive heart failure) (CMS/HCC)    • Coronary artery disease    • Diabetes mellitus (CMS/HCC)    • Disease of thyroid gland    • Elevated cholesterol    • GERD (gastroesophageal reflux disease)    •  History of transfusion    • Hyperlipidemia    • Hypertension    • PONV (postoperative nausea and vomiting)        Allergies   Allergen Reactions   • Bactrim [Sulfamethoxazole-Trimethoprim] Unknown (See Comments)     Patient confused, unable to tell reaction to medication at this time       Past Surgical History:   Procedure Laterality Date   • ABDOMINAL SURGERY     • BACK SURGERY     • CARDIAC CATHETERIZATION     • COLONOSCOPY     • EYE SURGERY     • HYSTERECTOMY         History reviewed. No pertinent family history.    Social History     Socioeconomic History   • Marital status:      Spouse name: Not on file   • Number of children: Not on file   • Years of education: Not on file   • Highest education level: Not on file   Tobacco Use   • Smoking status: Former Smoker   Substance and Sexual Activity   • Alcohol use: No     Frequency: Never   • Drug use: No   • Sexual activity: Defer           Objective   Physical Exam   Constitutional: She appears well-developed and well-nourished. She appears lethargic. She appears toxic. She appears distressed.   HENT:   Head: Normocephalic and atraumatic. Head is without raccoon's eyes and without Kevin's sign.   Right Ear: External ear normal. No hemotympanum.   Left Ear: External ear normal. No hemotympanum.   Nose: Nose normal.   Mouth/Throat: Uvula is midline, oropharynx is clear and moist and mucous membranes are normal.   Eyes: Pupils are equal, round, and reactive to light.   Neck: Normal range of motion. Neck supple.   Cardiovascular: Normal rate, regular rhythm, normal heart sounds and intact distal pulses. Exam reveals no gallop and no friction rub.   No murmur heard.  Pulmonary/Chest: Tachypnea noted. She is in respiratory distress. She has decreased breath sounds in the right lower field and the left lower field. She has no wheezes. She has no rales.   Abdominal: Soft. Normal appearance and bowel sounds are normal. She exhibits no distension and no mass. There  is no hepatosplenomegaly. There is generalized tenderness. There is no rigidity, no rebound, no guarding, no CVA tenderness, no tenderness at McBurney's point and negative Strong's sign. No hernia.   Neurological: She appears lethargic. GCS eye subscore is 3. GCS verbal subscore is 4. GCS motor subscore is 5.   Patient is a week.  She is noted to have tremors.  Her eyes are closed but she will open when spoken to.   Skin: Skin is warm and dry. Capillary refill takes less than 2 seconds. No rash noted. No erythema. No pallor.   Psychiatric: Judgment and thought content normal.   Nursing note and vitals reviewed.      Procedures           ED Course  ED Course as of Dec 12 0929   Thu Dec 12, 2019   0540 Care turned over to Dr. Carranza pending lab and CT results for admission.    [AL]      ED Course User Index  [AL] Yuliana Messina, NP      Ct Abdomen Pelvis Without Contrast    Result Date: 12/12/2019  1. Right-sided mild to moderate hydronephrosis due to a 7 mm proximal ureteral stone. 2. Normal appendix. 3. Scattered diverticulosis. 4. Air-fluid level in the bladder. Please correlate for airforming bacteria versus recent intervention. 5. Atrophy of the left kidney with question of a mass seen in the lateral anterior aspect of the left kidney on axial image 57,measures up to 3 cm. 6. Fatty infiltration of the pancreas. 7. Status post cholecystectomy. 8. Airspace opacity in the right lower lobe with air bronchograms. Please correlate for pneumonia versus atelectasis. Short-term follow-up after antibiotic therapy to exclude underlying mass/nodule is recommended. Electronically signed by:  Rafaela Huggins M.D.  12/12/2019 4:54 AM    Xr Chest 1 View    Result Date: 12/12/2019  Features suggesting mild interstitial pulmonary edema persists. However, there is improved left basilar aeration suggesting reexpansion of atelectasis. Mild medial bibasilar atelectatic changes remain.  Right IJ introducer central line removal. No  visible pneumothorax.  Electronically Signed By-Dr. Saima Mckeon MD On:12/12/2019 8:04 AM This report was finalized on [id number] by Dr. Saima Mckeon MD.    Medications   sodium chloride 0.9 % flush 10 mL (has no administration in time range)   acetaminophen (TYLENOL) suppository 650 mg (650 mg Rectal Given 12/12/19 8145)   sodium chloride 0.9% - IBW for BMI > 30 bolus 1,641 mL (0 mL/kg × 54.7 kg (Ideal) Intravenous Stopped 12/12/19 0530)   ceFEPime (MAXIPIME) in SWFI 2g/10ml IV PUSH syringe (2 g Intravenous Given 12/12/19 0457)   metroNIDAZOLE (FLAGYL) 500 mg/100mL IVPB (0 mg Intravenous Stopped 12/12/19 0705)     Labs Reviewed   COMPREHENSIVE METABOLIC PANEL - Abnormal; Notable for the following components:       Result Value    Glucose 292 (*)     BUN 50 (*)     Creatinine 2.60 (*)     Chloride 97 (*)     AST (SGOT) 42 (*)     eGFR Non  Amer 18 (*)     All other components within normal limits    Narrative:     GFR Normal >60  Chronic Kidney Disease <60  Kidney Failure <15     CBC WITH AUTO DIFFERENTIAL - Abnormal; Notable for the following components:    WBC 22.40 (*)     Hemoglobin 11.1 (*)     RDW 17.5 (*)     RDW-SD 55.1 (*)     Platelets 114 (*)     All other components within normal limits   URINALYSIS W/ CULTURE IF INDICATED - Abnormal; Notable for the following components:    Appearance, UA Turbid (*)     Blood, UA Large (3+) (*)     Protein, UA >=300 mg/dL (3+) (*)     Leuk Esterase, UA Large (3+) (*)     All other components within normal limits   CALCIUM, IONIZED - Abnormal; Notable for the following components:    Ionized Calcium 1.15 (*)     All other components within normal limits   C-REACTIVE PROTEIN - Abnormal; Notable for the following components:    C-Reactive Protein 6.89 (*)     All other components within normal limits   URINALYSIS, MICROSCOPIC ONLY - Abnormal; Notable for the following components:    RBC, UA 13-20 (*)     WBC, UA Too Numerous to Count (*)     Bacteria, UA 4+  (*)     Squamous Epithelial Cells, UA 3-6 (*)     All other components within normal limits   BLOOD GAS, ARTERIAL - Abnormal; Notable for the following components:    pH, Arterial 7.319 (*)     pCO2, Arterial 53.9 (*)     pO2, Arterial 121.1 (*)     O2 Saturation, Arterial 98.3 (*)     CO2 Content 29.4 (*)     All other components within normal limits   BNP (IN-HOUSE) - Abnormal; Notable for the following components:    proBNP 1,807.0 (*)     All other components within normal limits    Narrative:     Among patients with dyspnea, NT-proBNP is highly sensitive for the detection of acute congestive heart failure. In addition NT-proBNP of <300 pg/ml effectively rules out acute congestive heart failure with 99% negative predictive value.     TROPONIN (IN-HOUSE) - Abnormal; Notable for the following components:    Troponin T 0.067 (*)     All other components within normal limits    Narrative:     Troponin T Reference Range:  <= 0.03 ng/mL-   Negative for AMI  >0.03 ng/mL-     Abnormal for myocardial necrosis.  Clinicians would have to utilize clinical acumen, EKG, Troponin and serial changes to determine if it is an Acute Myocardial Infarction or myocardial injury due to an underlying chronic condition.    POC LACTATE - Abnormal; Notable for the following components:    Lactate 2.7 (*)     All other components within normal limits   POC LACTATE - Abnormal; Notable for the following components:    Lactate 2.4 (*)     All other components within normal limits   MANUAL DIFFERENTIAL - Abnormal; Notable for the following components:    Lymphocyte % 0.0 (*)     Bands %  16.0 (*)     Metamyelocyte % 2.0 (*)     Neutrophils Absolute 20.38 (*)     Lymphocytes Absolute 0.00 (*)     Monocytes Absolute 1.34 (*)     nRBC 1.0 (*)     All other components within normal limits   RESPIRATORY PANEL, PCR - Normal   PROTIME-INR - Normal   APTT - Normal   MAGNESIUM - Normal   PHOSPHORUS - Normal   BLOOD CULTURE   BLOOD CULTURE   URINE  CULTURE   RAINBOW DRAW    Narrative:     The following orders were created for panel order [place] Draw.  Procedure                               Abnormality         Status                     ---------                               -----------         ------                     Light Blue Top[[id number]]                                   Final result               Green Top (Gel)[[id number]]                                  Final result               Lavender Top[[id number]]                                     Final result               Gold Top - SST[[id number]]                                   Final result                 Please view results for these tests on the individual orders.   LACTIC ACID REFLEX TIMER   BLOOD GAS, ARTERIAL   SCAN SLIDE   LACTIC ACID, REFLEX   POC LACTATE   POC LACTATE   CBC AND DIFFERENTIAL    Narrative:     The following orders were created for panel order CBC & Differential.  Procedure                               Abnormality         Status                     ---------                               -----------         ------                     CBC Auto Differential[[id number]]        Abnormal            Final result                 Please view results for these tests on the individual orders.   LIGHT BLUE TOP   GREEN TOP   LAVENDER TOP   GOLD TOP - SST                     No data recorded                        MDM  Number of Diagnoses or Management Options  Altered mental status, unspecified altered mental status type:   Generalized abdominal pain:   Right ureteral stone:   Severe sepsis (CMS/HCC):   Diagnosis management comments: Chart Review: 10/24/2019 patient was admitted in the hospital for respiratory and renal failure.  Comorbidity: CHF, CAD, diabetes mellitus, thyroid disease, hyperlipidemia, GERD, hypertension  Imaging: Was interpreted by physician and reviewed by myself:  Disposition/Treatment: Discussed results with patient, verbalized understanding.  Agreeable with plan of  care.    Patient undressed and placed in gown for exam. 84-year-old female presents with family at bedside for a decline in her responsiveness.  Daughter at bedside who is her POA reports that patient was placed on hospice related to her renal failure.  She had previously been seen by nephrology and they had attempted dialysis and patient was unable to tolerate.  Patient's daughter reports that they received a phone call at 2 AM related to patient's fever.  Patient's daughter, Madina White is her POA and reports that they do not want any invasive procedures done.  She reports that she is a DO NOT RESUSCITATE.  IV established and labs obtained.  Sepsis protocol initiated.  Patient was started on cefepime and Flagyl.  Patient was given sepsis 30 mill per kilo bolus.    Patient is very somnolent with gag present.  Unarousable to voice.  I had an extensive conversation with daughter who understands patient's condition is critical given her sepsis, infected right ureteral stone.  Daughter understands a standard of care would be urological consultation and likely emergent surgery for removal.  Daughter declines any procedures at this time.  Daughter understands patient has elevated CO2 and this will likely progress to respiratory failure, daughter declines any BiPAP or intubation.  Daughter does not want to make full comfort measures at this time and is comfortable with IV antibiotics and further medical management as long as it does not involve surgery or procedures intubation or CPR.         Amount and/or Complexity of Data Reviewed  Clinical lab tests: reviewed  Decide to obtain previous medical records or to obtain history from someone other than the patient: yes        Final diagnoses:   Altered mental status, unspecified altered mental status type   Severe sepsis (CMS/[facility])   Generalized abdominal pain   Right ureteral stone              Saul Carranza MD  12/12/19 8226

## 2019-12-12 NOTE — ED NOTES
Pt care assumed at this time. Pt sleeping with family at bedside. Dr Carranza in to discuss results and plan of care.     Silva Tan, RN  12/12/19 5085

## 2019-12-12 NOTE — PLAN OF CARE
Problem: Patient Care Overview  Goal: Plan of Care Review  Outcome: Ongoing (interventions implemented as appropriate)  Flowsheets (Taken 12/12/2019 1126)  Progress: no change  Plan of Care Reviewed With: patient

## 2019-12-12 NOTE — NURSING NOTE
Consult for buttocks wound. Photo viewed and discussed with bedside RN. Pt is incontinent and somewhat immobile.  Injury likely combination of moisture and pressure ( stage 2) and shearing.      Cont zinc moisture barrier (calazime) bid and prn. Cont skin at risk protocol such as turn q 2, ultrasorb pads, bedside RN reports external female catheter is in place, use waffle cushion when oob in chair.  Etc.

## 2019-12-12 NOTE — DISCHARGE PLACEMENT REQUEST
"Nigel Quintanilla (84 y.o. Female)     Date of Birth Social Security Number Address Home Phone MRN    [id number]  [address] [place] [address] [address] [place] [address] [phone] [id number]    Confucianist Marital Status          None        Admission Date Admission Type Admitting Provider Attending Provider Department, Room/Bed    12/12/19 Emergency Florina Lund MD Seo, Mi La, MD [facility] 2C MEDICAL INPATIENT, 249/1    Discharge Date Discharge Disposition Discharge Destination                       Attending Provider:  Florina Lund MD    Allergies:  Bactrim [Sulfamethoxazole-trimethoprim]    Isolation:  Contact   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)   Code Status:  No CPR    Ht:  162.6 cm (64\")   Wt:  87.5 kg (192 lb 14.4 oz)    Admission Cmt:  None   Principal Problem:  None                Active Insurance as of 12/12/2019     Primary Coverage     Payor Plan Insurance Group Employer/Plan Group    MEDICARE MEDICARE A & B      Payor Plan Address Payor Plan Phone Number Payor Plan Fax Number Effective Dates    [address] [phone]  2/1/1996 - None Entered    [facility] [id number]       Subscriber Name Subscriber Birth Date Member ID       NIGEL QUINTANILLA 1935 [id number]           Secondary Coverage     Payor Plan Insurance Group Employer/Plan Group    [place]      Payor Plan Address Payor Plan Phone Number Payor Plan Fax Number Effective Dates    [address]   9/20/2019 - None Entered    [place] [address]       Subscriber Name Subscriber Birth Date Member ID       NIGEL QUINTANILLA 1935 [id number]                 Emergency Contacts      (Rel.) Home Phone Work Phone Mobile Phone    GEOFFREY WEINER (Daughter) -- -- [phone]            Emergency Contact Information     Name Relation Home Work Mobile    GEOFFREY WEINER Daughter   [phone]          Insurance Information                MEDICARE/MEDICARE A & B Phone: [phone]    Subscriber: Nigel Quintanilla " Subscriber#: [id number]    Group#:  Precert#:         [place] Phone:     Subscriber: Amanda Combs Subscriber#: [id number]    Group#:  Precert#:

## 2019-12-13 NOTE — DISCHARGE SUMMARY
[facility]   DISCHARGE SUMMARY    Patient Name: Amanda Combs  : 1935  MRN: [id number]    Date of Admission: 2019  Date of Discharge:  2019    Primary Care Physician: Chris Remy MD    Consults     No orders found for last 30 day(s).          Hospital Course     Presenting Problem:   Generalized abdominal pain [R10.84]  Right ureteral stone [N20.1]  Severe sepsis (CMS/HCC) [A41.9, R65.20]  Altered mental status, unspecified altered mental status type [R41.82]  Altered mental status, unspecified altered mental status type [R41.82]    Active Hospital Problems:  No notes have been filed under this hospital service.  Service: Hospitalist  // sepsis due to UTI, complicated and bacteremia due to Kleb P.  ; cefepime was given  ; no fluid since stable in  and limited intervention per daughter    // bacteremia due to Kleb P  ; in 2/2 set blood cx, positive  ; fever resolved    // acute metabolic encephalopathy  ; due to sepsis       // complicated UTI due to obstructive prox ureter stone  ; no surgical intervention since hospice care     // chronic pain on methadone  ; continue current pain jessica        // DM  ; SSI    // possible underlying DM/MCI       Resolved Hospital Problems:  No notes have been filed under this hospital service.  Service: Hospitalist        Hospital Course:  Amanda Combs is a 84 y.o. female  With complex medical history, recently discharged to [facility] under hospice service, due to ESRD, not wanting HD. Per daughter actually she was doing fine since discharge with decent oral intake with renal function improved somewhat, but suddenly she developed fever and abdominal pain, so brought in for further study. On presentation, she was septic with positive UA, high WBC, altered mentation. CT showed obstructive stone on R prox ureter 6.6mm with hydroureteronephrosis.  Subsequently blood cx grew Kleb P in 2/2 sets.  Leukocytosis is worse today. Fever somehow resolved.  She received cefepime per daughter's wish but continued on limited tx with comfort measure. Discussed with daughter about septic condition which would not resolved without removing obstructive renal stone. However pt is not stable to go for surgical procedure. So hospice was consulted. After discussion with hospice at length, family opted to continue comfort measure only at NH without further tx for current acute condition. So pt was transferred back to facility on hospice.       Discharge Follow Up Recommendations for labs/diagnostics:       Day of Discharge     HPI:   84 y.o. female with PMH of ESRD not on HD, CAD, CHF, HTN, morbid obesity, chronic pain on methadone, cognitive impairment, chronic debility who was recently discharged on hospice at NH. Per daughter she has been doing relatively well but developed abdominal pain and fever yesterday, so brought into our ER.      Work up- shows positive UA. CT c/w obstructive ureter stone , proximal 6.6mm, mild hydronephrosis an hydroureter. Cr remains close to her baseline.   On admission, mild tachycardia 90s, , afebrile.   Labs- leukocytosis [id number] with bandemia and L shift     Pt keeps eyes closed, not participating in conversation. Most of information was given by daughter who states she is wc bound, has been eating decently and still taking some of her medications, followed by renal .     Daughter states she will continue on comfort measure and hospice but wants to try abx and same in no CPR, no Bipap, no surgical procedure.     Vital Signs:   Temp:  [97.9 °F (36.6 °C)-99.7 °F (37.6 °C)] 97.9 °F (36.6 °C)  Heart Rate:  [] 85  Resp:  [13-18] 13  BP: (102-154)/(39-79) 115/44     Physical Exam:    Constitutional: She appears obese. She appears distressed. Chronically ill. Keeps eyes closed. Not much responding to questions but arousable and opening eyes and close the eye back quickly. obese  HENT:   Head: Normocephalic and atraumatic.   Mouth/Throat: No  oropharyngeal exudate.   Eyes: Pupils are equal, round, and reactive to light. Conjunctivae and EOM are normal. No scleral icterus.   Neck: Normal range of motion. Neck supple. No JVD present. No thyromegaly present.   Cardiovascular: Normal rate and regular rhythm.   Murmur heard.  Pulmonary/Chest: Effort normal and breath sounds diminished in both, rales at base   Abdominal: Soft. Bowel sounds are normal. She exhibits no distension. There is no tenderness. There is no guarding.   Musculoskeletal: She exhibits edema.   Neurological:   Decreased in response   Skin: Skin is warm and dry.   Psychiatric:   Unable to assess due to diminished alertness        Pertinent  and/or Most Recent Results     Results from last 7 days   Lab Units 12/13/19  0337 12/12/19  1039 12/12/19  0406   WBC 10*3/mm3 24.50* 23.50* 22.40*   HEMOGLOBIN g/dL 9.4* 10.5* 11.1*   HEMATOCRIT % 30.4* 33.3* 34.8   PLATELETS 10*3/mm3 85* 100* 114*   SODIUM mmol/L 140 137 138   POTASSIUM mmol/L 4.1 4.2 3.8   CHLORIDE mmol/L 101 98 97*   CO2 mmol/L 25.0 24.0 27.0   BUN mg/dL 63* 53* 50*   CREATININE mg/dL 3.82* 2.79* 2.60*   GLUCOSE mg/dL 222* 271* 292*   CALCIUM mg/dL 8.1* 8.4* 9.1     Results from last 7 days   Lab Units 12/12/19  0406   BILIRUBIN mg/dL 0.4   ALK PHOS U/L 116   ALT (SGPT) U/L 25   AST (SGOT) U/L 42*   PROTIME Seconds 10.8   INR  1.03   APTT seconds 24.2           Invalid input(s): TG, LDLCALC, LDLREALC  Results from last 7 days   Lab Units 12/12/19  1039 12/12/19  0755 12/12/19  0411 12/12/19  0406   PROBNP pg/mL  --   --   --  1,807.0*   TROPONIN T ng/mL  --   --   --  0.067*   LACTATE mmol/L 3.2* 2.4* 2.7*  --        Brief Urine Lab Results  (Last result in the past 365 days)      Color   Clarity   Blood   Leuk Est   Nitrite   Protein   CREAT   Urine HCG        12/12/19 8229 Yellow Turbid  Comment:  Result checked  Large (3+) Large (3+) Negative >=300 mg/dL (3+)               Microbiology Results Abnormal     Procedure Component  Value - Date/Time    Blood Culture - Blood, Arm, Right [[id number]]  (Abnormal) Collected:  12/12/19 0406    Lab Status:  Preliminary result Specimen:  Blood from Arm, Right Updated:  12/13/19 0605     Blood Culture Gram Negative Bacilli     Isolated from Aerobic and Anaerobic Bottles     Gram Stain Anaerobic Bottle Gram negative bacilli      Aerobic Bottle Gram negative bacilli    Blood Culture - Blood, Arm, Left [[id number]]  (Abnormal) Collected:  12/12/19 0427    Lab Status:  Preliminary result Specimen:  Blood from Arm, Left Updated:  12/13/19 0603     Blood Culture Gram Negative Bacilli     Isolated from Aerobic and Anaerobic Bottles     Gram Stain Anaerobic Bottle Gram negative bacilli      Aerobic Bottle Gram negative bacilli    Blood Culture ID, PCR - Blood, Arm, Left [[id number]]  (Abnormal) Collected:  12/12/19 0427    Lab Status:  Final result Specimen:  Blood from Arm, Left Updated:  12/12/19 1845     BCID, PCR Klebsiella pneumoniae. Identification by BCID PCR.    Respiratory Panel, PCR - Swab, Nasopharynx [[id number]]  (Normal) Collected:  12/12/19 0421    Lab Status:  Final result Specimen:  Swab from Nasopharynx Updated:  12/12/19 0539     ADENOVIRUS, PCR Not Detected     Coronavirus 229E Not Detected     Coronavirus HKU1 Not Detected     Coronavirus NL63 Not Detected     Coronavirus OC43 Not Detected     Human Metapneumovirus Not Detected     Human Rhinovirus/Enterovirus Not Detected     Influenza B PCR Not Detected     Parainfluenza Virus 1 Not Detected     Parainfluenza Virus 2 Not Detected     Parainfluenza Virus 3 Not Detected     Parainfluenza Virus 4 Not Detected     Bordetella pertussis pcr Not Detected     Influenza A H1 2009 PCR Not Detected     Chlamydophila pneumoniae PCR Not Detected     Mycoplasma pneumo by PCR Not Detected     Influenza A PCR Not Detected     Influenza A H3 Not Detected     Influenza A H1 Not Detected     RSV, PCR Not Detected          Ct Abdomen Pelvis Without  Contrast    Result Date: 12/12/2019  Impression: 1. Right-sided mild to moderate hydronephrosis due to a 7 mm proximal ureteral stone. 2. Normal appendix. 3. Scattered diverticulosis. 4. Air-fluid level in the bladder. Please correlate for airforming bacteria versus recent intervention. 5. Atrophy of the left kidney with question of a mass seen in the lateral anterior aspect of the left kidney on axial image 57,measures up to 3 cm. 6. Fatty infiltration of the pancreas. 7. Status post cholecystectomy. 8. Airspace opacity in the right lower lobe with air bronchograms. Please correlate for pneumonia versus atelectasis. Short-term follow-up after antibiotic therapy to exclude underlying mass/nodule is recommended. Electronically signed by:  Rafaela Huggins M.D.  12/12/2019 4:54 AM    Xr Chest 1 View    Result Date: 12/12/2019  Impression: Features suggesting mild interstitial pulmonary edema persists. However, there is improved left basilar aeration suggesting reexpansion of atelectasis. Mild medial bibasilar atelectatic changes remain.  Right IJ introducer central line removal. No visible pneumothorax.  Electronically Signed By-Dr. Saima Mckeon MD On:12/12/2019 8:04 AM This report was finalized on [id number] by Dr. Saima Mckeon MD.                      Order Current Status    Urine Culture - Urine, Urine, Catheter In process    Blood Culture - Blood, Arm, Left Preliminary result    Blood Culture - Blood, Arm, Right Preliminary result        Discharge Details        Discharge Medications      Continue These Medications      Instructions Start Date   bisacodyl 5 MG EC tablet  Commonly known as:  DULCOLAX   10 mg, Oral, Daily PRN, At bedtime      fleet enema 7-19 GM/118ML enema   1 enema, Rectal, Daily PRN      methadone 10 MG tablet  Commonly known as:  DOLOPHINE   10 mg, Oral, 3 Times Daily      morphine 20 MG/5ML solution   5.2 mg, Oral, Every 2 Hours PRN      ondansetron 4 MG tablet  Commonly known as:   ZOFRAN   4 mg, Oral, Every 4 Hours PRN      polyethylene glycol packet  Commonly known as:  MIRALAX   17 g, Oral, Daily PRN      sodium chloride 0.65 % nasal spray   2 sprays, Nasal, As Needed      triamcinolone 0.1 % cream  Commonly known as:  KENALOG   1 application, Topical, 2 Times Daily         Stop These Medications    busPIRone 10 MG tablet  Commonly known as:  BUSPAR     furosemide 80 MG tablet  Commonly known as:  LASIX     hyoscyamine 0.125 MG tablet  Commonly known as:  ANASPAZ,LEVSIN     insulin aspart 100 UNIT/ML injection  Commonly known as:  novoLOG     insulin aspart 100 UNIT/ML solution pen-injector sc pen  Commonly known as:  novoLOG FLEXPEN     ipratropium-albuterol 0.5-2.5 mg/3 ml nebulizer  Commonly known as:  DUO-NEB     melatonin 3 MG tablet     SENEXON-S 8.6-50 MG per tablet  Generic drug:  sennosides-docusate     ZOLOFT 25 MG tablet  Generic drug:  sertraline            Allergies   Allergen Reactions   • Bactrim [Sulfamethoxazole-Trimethoprim] Unknown (See Comments)     Patient confused, unable to tell reaction to medication at this time         Discharge Disposition:  Hospice/Medical Facility (DC - External)    Diet:  Hospital:  Diet Order   Procedures   • NPO Diet         Discharge Activity: as tolerated        CODE STATUS:    Code Status and Medical Interventions:   Ordered at: 12/12/19 1220     Level Of Support Discussed With:    Health Care Surrogate     Code Status:    No CPR     Medical Interventions (Level of Support Prior to Arrest):    Comfort Measures     Comments:    no bipap, comfort measure only and current medications and antibiotcs         Future Appointments   Date Time Provider Department Center   7/27/2020  2:20 PM Joel Zelaya MD [facility]           Time spent on Discharge including face to face service:  35 minutes    Electronically signed by Florina Marques MD, 12/13/19, 10:24 AM.

## 2019-12-13 NOTE — PLAN OF CARE
Problem: Patient Care Overview  Goal: Plan of Care Review  Outcome: Ongoing (interventions implemented as appropriate)  Flowsheets (Taken 12/13/2019 0411)  Progress: no change  Plan of Care Reviewed With: patient  Note:   Patient has been asleep through the night. She complained of neck pain earlier and was given a tylenol suppository. Will continue to monitor.

## 2019-12-16 NOTE — PROGRESS NOTES
Case Management Discharge Note      Final Note: [facility].                      Final Discharge Disposition Code: 04 - intermediate care facility, 51 - hospice medical facility